# Patient Record
Sex: FEMALE | Race: WHITE | NOT HISPANIC OR LATINO
[De-identification: names, ages, dates, MRNs, and addresses within clinical notes are randomized per-mention and may not be internally consistent; named-entity substitution may affect disease eponyms.]

---

## 2022-01-01 ENCOUNTER — TRANSCRIPTION ENCOUNTER (OUTPATIENT)
Age: 82
End: 2022-01-01

## 2022-01-01 ENCOUNTER — INPATIENT (INPATIENT)
Facility: HOSPITAL | Age: 82
LOS: 9 days | Discharge: EXPIRED | End: 2022-12-13
Attending: INTERNAL MEDICINE | Admitting: INTERNAL MEDICINE

## 2022-01-01 ENCOUNTER — INPATIENT (INPATIENT)
Facility: HOSPITAL | Age: 82
LOS: 3 days | Discharge: HOME HEALTH | End: 2022-11-26
Attending: HOSPITALIST | Admitting: HOSPITALIST

## 2022-01-01 VITALS
OXYGEN SATURATION: 96 % | DIASTOLIC BLOOD PRESSURE: 61 MMHG | HEART RATE: 66 BPM | SYSTOLIC BLOOD PRESSURE: 131 MMHG | RESPIRATION RATE: 18 BRPM | TEMPERATURE: 98 F

## 2022-01-01 VITALS
SYSTOLIC BLOOD PRESSURE: 115 MMHG | OXYGEN SATURATION: 96 % | RESPIRATION RATE: 18 BRPM | DIASTOLIC BLOOD PRESSURE: 75 MMHG | HEART RATE: 108 BPM | TEMPERATURE: 97 F

## 2022-01-01 VITALS
RESPIRATION RATE: 19 BRPM | SYSTOLIC BLOOD PRESSURE: 99 MMHG | HEART RATE: 78 BPM | DIASTOLIC BLOOD PRESSURE: 47 MMHG | TEMPERATURE: 98 F | OXYGEN SATURATION: 94 %

## 2022-01-01 VITALS — HEIGHT: 63 IN

## 2022-01-01 DIAGNOSIS — Z51.5 ENCOUNTER FOR PALLIATIVE CARE: ICD-10-CM

## 2022-01-01 DIAGNOSIS — R13.10 DYSPHAGIA, UNSPECIFIED: ICD-10-CM

## 2022-01-01 DIAGNOSIS — R62.7 ADULT FAILURE TO THRIVE: ICD-10-CM

## 2022-01-01 DIAGNOSIS — I10 ESSENTIAL (PRIMARY) HYPERTENSION: ICD-10-CM

## 2022-01-01 DIAGNOSIS — E04.2 NONTOXIC MULTINODULAR GOITER: ICD-10-CM

## 2022-01-01 DIAGNOSIS — E27.8 OTHER SPECIFIED DISORDERS OF ADRENAL GLAND: ICD-10-CM

## 2022-01-01 DIAGNOSIS — Z29.9 ENCOUNTER FOR PROPHYLACTIC MEASURES, UNSPECIFIED: ICD-10-CM

## 2022-01-01 DIAGNOSIS — R93.89 ABNORMAL FINDINGS ON DIAGNOSTIC IMAGING OF OTHER SPECIFIED BODY STRUCTURES: ICD-10-CM

## 2022-01-01 DIAGNOSIS — A41.9 SEPSIS, UNSPECIFIED ORGANISM: ICD-10-CM

## 2022-01-01 DIAGNOSIS — N17.9 ACUTE KIDNEY FAILURE, UNSPECIFIED: ICD-10-CM

## 2022-01-01 DIAGNOSIS — K92.2 GASTROINTESTINAL HEMORRHAGE, UNSPECIFIED: ICD-10-CM

## 2022-01-01 DIAGNOSIS — R41.0 DISORIENTATION, UNSPECIFIED: ICD-10-CM

## 2022-01-01 DIAGNOSIS — E78.5 HYPERLIPIDEMIA, UNSPECIFIED: ICD-10-CM

## 2022-01-01 DIAGNOSIS — R77.8 OTHER SPECIFIED ABNORMALITIES OF PLASMA PROTEINS: ICD-10-CM

## 2022-01-01 DIAGNOSIS — R94.6 ABNORMAL RESULTS OF THYROID FUNCTION STUDIES: ICD-10-CM

## 2022-01-01 DIAGNOSIS — R53.1 WEAKNESS: ICD-10-CM

## 2022-01-01 DIAGNOSIS — E05.90 THYROTOXICOSIS, UNSPECIFIED WITHOUT THYROTOXIC CRISIS OR STORM: ICD-10-CM

## 2022-01-01 DIAGNOSIS — E87.8 OTHER DISORDERS OF ELECTROLYTE AND FLUID BALANCE, NOT ELSEWHERE CLASSIFIED: ICD-10-CM

## 2022-01-01 DIAGNOSIS — E83.52 HYPERCALCEMIA: ICD-10-CM

## 2022-01-01 DIAGNOSIS — K52.9 NONINFECTIVE GASTROENTERITIS AND COLITIS, UNSPECIFIED: ICD-10-CM

## 2022-01-01 DIAGNOSIS — U07.1 COVID-19: ICD-10-CM

## 2022-01-01 DIAGNOSIS — E87.0 HYPEROSMOLALITY AND HYPERNATREMIA: ICD-10-CM

## 2022-01-01 DIAGNOSIS — Z71.89 OTHER SPECIFIED COUNSELING: ICD-10-CM

## 2022-01-01 LAB
24R-OH-CALCIDIOL SERPL-MCNC: 45.4 NG/ML — SIGNIFICANT CHANGE UP (ref 30–80)
ALBUMIN SERPL ELPH-MCNC: 1.6 G/DL — LOW (ref 3.3–5)
ALBUMIN SERPL ELPH-MCNC: 1.7 G/DL — LOW (ref 3.3–5)
ALBUMIN SERPL ELPH-MCNC: 1.7 G/DL — LOW (ref 3.3–5)
ALBUMIN SERPL ELPH-MCNC: 1.8 G/DL — LOW (ref 3.3–5)
ALBUMIN SERPL ELPH-MCNC: 1.9 G/DL — LOW (ref 3.3–5)
ALBUMIN SERPL ELPH-MCNC: 2 G/DL — LOW (ref 3.3–5)
ALBUMIN SERPL ELPH-MCNC: 2.2 G/DL — LOW (ref 3.3–5)
ALBUMIN SERPL ELPH-MCNC: 2.2 G/DL — LOW (ref 3.3–5)
ALBUMIN SERPL ELPH-MCNC: 2.3 G/DL — LOW (ref 3.3–5)
ALBUMIN SERPL ELPH-MCNC: 2.8 G/DL — LOW (ref 3.3–5)
ALBUMIN SERPL ELPH-MCNC: 3 G/DL — LOW (ref 3.3–5)
ALBUMIN SERPL ELPH-MCNC: 3.5 G/DL — SIGNIFICANT CHANGE UP (ref 3.3–5)
ALDOST SERPL-MCNC: 3.3 NG/DL — SIGNIFICANT CHANGE UP
ALP SERPL-CCNC: 102 U/L — SIGNIFICANT CHANGE UP (ref 40–120)
ALP SERPL-CCNC: 103 U/L — SIGNIFICANT CHANGE UP (ref 40–120)
ALP SERPL-CCNC: 104 U/L — SIGNIFICANT CHANGE UP (ref 40–120)
ALP SERPL-CCNC: 105 U/L — SIGNIFICANT CHANGE UP (ref 40–120)
ALP SERPL-CCNC: 127 U/L — HIGH (ref 40–120)
ALP SERPL-CCNC: 130 U/L — HIGH (ref 40–120)
ALP SERPL-CCNC: 131 U/L — HIGH (ref 40–120)
ALP SERPL-CCNC: 140 U/L — HIGH (ref 40–120)
ALP SERPL-CCNC: 86 U/L — SIGNIFICANT CHANGE UP (ref 40–120)
ALP SERPL-CCNC: 96 U/L — SIGNIFICANT CHANGE UP (ref 40–120)
ALP SERPL-CCNC: 96 U/L — SIGNIFICANT CHANGE UP (ref 40–120)
ALP SERPL-CCNC: 99 U/L — SIGNIFICANT CHANGE UP (ref 40–120)
ALT FLD-CCNC: 10 U/L — SIGNIFICANT CHANGE UP (ref 4–33)
ALT FLD-CCNC: 10 U/L — SIGNIFICANT CHANGE UP (ref 4–33)
ALT FLD-CCNC: 109 U/L — HIGH (ref 4–33)
ALT FLD-CCNC: 145 U/L — HIGH (ref 4–33)
ALT FLD-CCNC: 174 U/L — HIGH (ref 4–33)
ALT FLD-CCNC: 35 U/L — HIGH (ref 4–33)
ALT FLD-CCNC: 36 U/L — HIGH (ref 4–33)
ALT FLD-CCNC: 50 U/L — HIGH (ref 4–33)
ALT FLD-CCNC: 52 U/L — HIGH (ref 4–33)
ALT FLD-CCNC: 71 U/L — HIGH (ref 4–33)
ALT FLD-CCNC: 93 U/L — HIGH (ref 4–33)
ALT FLD-CCNC: SIGNIFICANT CHANGE UP U/L (ref 4–33)
ANION GAP SERPL CALC-SCNC: 10 MMOL/L — SIGNIFICANT CHANGE UP (ref 7–14)
ANION GAP SERPL CALC-SCNC: 11 MMOL/L — SIGNIFICANT CHANGE UP (ref 7–14)
ANION GAP SERPL CALC-SCNC: 12 MMOL/L — SIGNIFICANT CHANGE UP (ref 7–14)
ANION GAP SERPL CALC-SCNC: 13 MMOL/L — SIGNIFICANT CHANGE UP (ref 7–14)
ANION GAP SERPL CALC-SCNC: 14 MMOL/L — SIGNIFICANT CHANGE UP (ref 7–14)
ANION GAP SERPL CALC-SCNC: 14 MMOL/L — SIGNIFICANT CHANGE UP (ref 7–14)
ANION GAP SERPL CALC-SCNC: 15 MMOL/L — HIGH (ref 7–14)
ANION GAP SERPL CALC-SCNC: 15 MMOL/L — HIGH (ref 7–14)
ANION GAP SERPL CALC-SCNC: 16 MMOL/L — HIGH (ref 7–14)
ANION GAP SERPL CALC-SCNC: 16 MMOL/L — HIGH (ref 7–14)
ANION GAP SERPL CALC-SCNC: 17 MMOL/L — HIGH (ref 7–14)
ANION GAP SERPL CALC-SCNC: 19 MMOL/L — HIGH (ref 7–14)
ANION GAP SERPL CALC-SCNC: 22 MMOL/L — HIGH (ref 7–14)
ANION GAP SERPL CALC-SCNC: 24 MMOL/L — HIGH (ref 7–14)
ANION GAP SERPL CALC-SCNC: 33 MMOL/L — HIGH (ref 7–14)
ANION GAP SERPL CALC-SCNC: 8 MMOL/L — SIGNIFICANT CHANGE UP (ref 7–14)
ANISOCYTOSIS BLD QL: SLIGHT — SIGNIFICANT CHANGE UP
APPEARANCE UR: ABNORMAL
APTT BLD: 30.3 SEC — SIGNIFICANT CHANGE UP (ref 27–36.3)
APTT BLD: 31.2 SEC — SIGNIFICANT CHANGE UP (ref 27–36.3)
AST SERPL-CCNC: 136 U/L — HIGH (ref 4–32)
AST SERPL-CCNC: 17 U/L — SIGNIFICANT CHANGE UP (ref 4–32)
AST SERPL-CCNC: 177 U/L — HIGH (ref 4–32)
AST SERPL-CCNC: 21 U/L — SIGNIFICANT CHANGE UP (ref 4–32)
AST SERPL-CCNC: 26 U/L — SIGNIFICANT CHANGE UP (ref 4–32)
AST SERPL-CCNC: 306 U/L — HIGH (ref 4–32)
AST SERPL-CCNC: 36 U/L — HIGH (ref 4–32)
AST SERPL-CCNC: 444 U/L — HIGH (ref 4–32)
AST SERPL-CCNC: 57 U/L — HIGH (ref 4–32)
AST SERPL-CCNC: 65 U/L — HIGH (ref 4–32)
AST SERPL-CCNC: 66 U/L — HIGH (ref 4–32)
AST SERPL-CCNC: 92 U/L — HIGH (ref 4–32)
B PERT DNA SPEC QL NAA+PROBE: SIGNIFICANT CHANGE UP
B PERT+PARAPERT DNA PNL SPEC NAA+PROBE: SIGNIFICANT CHANGE UP
BACTERIA # UR AUTO: NEGATIVE — SIGNIFICANT CHANGE UP
BASE EXCESS BLDV CALC-SCNC: -3 MMOL/L — LOW (ref -2–3)
BASE EXCESS BLDV CALC-SCNC: -6.6 MMOL/L — LOW (ref -2–3)
BASE EXCESS BLDV CALC-SCNC: -9.9 MMOL/L — LOW (ref -2–3)
BASE EXCESS BLDV CALC-SCNC: 2.9 MMOL/L — SIGNIFICANT CHANGE UP (ref -2–3)
BASE EXCESS BLDV CALC-SCNC: 3.9 MMOL/L — HIGH (ref -2–3)
BASE EXCESS BLDV CALC-SCNC: 5.9 MMOL/L — HIGH (ref -2–3)
BASOPHILS # BLD AUTO: 0 K/UL — SIGNIFICANT CHANGE UP (ref 0–0.2)
BASOPHILS # BLD AUTO: 0.01 K/UL — SIGNIFICANT CHANGE UP (ref 0–0.2)
BASOPHILS # BLD AUTO: 0.02 K/UL — SIGNIFICANT CHANGE UP (ref 0–0.2)
BASOPHILS # BLD AUTO: 0.02 K/UL — SIGNIFICANT CHANGE UP (ref 0–0.2)
BASOPHILS NFR BLD AUTO: 0 % — SIGNIFICANT CHANGE UP (ref 0–2)
BASOPHILS NFR BLD AUTO: 0.1 % — SIGNIFICANT CHANGE UP (ref 0–2)
BASOPHILS NFR BLD AUTO: 0.2 % — SIGNIFICANT CHANGE UP (ref 0–2)
BASOPHILS NFR BLD AUTO: 0.2 % — SIGNIFICANT CHANGE UP (ref 0–2)
BILIRUB DIRECT SERPL-MCNC: 0.2 MG/DL — SIGNIFICANT CHANGE UP (ref 0–0.3)
BILIRUB DIRECT SERPL-MCNC: 0.3 MG/DL — SIGNIFICANT CHANGE UP (ref 0–0.3)
BILIRUB DIRECT SERPL-MCNC: 0.4 MG/DL — HIGH (ref 0–0.3)
BILIRUB DIRECT SERPL-MCNC: <0.2 MG/DL — SIGNIFICANT CHANGE UP (ref 0–0.3)
BILIRUB DIRECT SERPL-MCNC: <0.2 MG/DL — SIGNIFICANT CHANGE UP (ref 0–0.3)
BILIRUB INDIRECT FLD-MCNC: 0.2 MG/DL — SIGNIFICANT CHANGE UP (ref 0–1)
BILIRUB INDIRECT FLD-MCNC: 0.3 MG/DL — SIGNIFICANT CHANGE UP (ref 0–1)
BILIRUB INDIRECT FLD-MCNC: 0.4 MG/DL — SIGNIFICANT CHANGE UP (ref 0–1)
BILIRUB INDIRECT FLD-MCNC: >0.3 MG/DL — SIGNIFICANT CHANGE UP (ref 0–1)
BILIRUB INDIRECT FLD-MCNC: >0.4 MG/DL — SIGNIFICANT CHANGE UP (ref 0–1)
BILIRUB SERPL-MCNC: 0.4 MG/DL — SIGNIFICANT CHANGE UP (ref 0.2–1.2)
BILIRUB SERPL-MCNC: 0.4 MG/DL — SIGNIFICANT CHANGE UP (ref 0.2–1.2)
BILIRUB SERPL-MCNC: 0.5 MG/DL — SIGNIFICANT CHANGE UP (ref 0.2–1.2)
BILIRUB SERPL-MCNC: 0.6 MG/DL — SIGNIFICANT CHANGE UP (ref 0.2–1.2)
BILIRUB SERPL-MCNC: 0.7 MG/DL — SIGNIFICANT CHANGE UP (ref 0.2–1.2)
BILIRUB SERPL-MCNC: 0.8 MG/DL — SIGNIFICANT CHANGE UP (ref 0.2–1.2)
BILIRUB SERPL-MCNC: 1.1 MG/DL — SIGNIFICANT CHANGE UP (ref 0.2–1.2)
BILIRUB SERPL-MCNC: 1.1 MG/DL — SIGNIFICANT CHANGE UP (ref 0.2–1.2)
BILIRUB SERPL-MCNC: 1.5 MG/DL — HIGH (ref 0.2–1.2)
BILIRUB UR-MCNC: ABNORMAL
BLD GP AB SCN SERPL QL: NEGATIVE — SIGNIFICANT CHANGE UP
BLD GP AB SCN SERPL QL: NEGATIVE — SIGNIFICANT CHANGE UP
BLOOD GAS VENOUS COMPREHENSIVE RESULT: SIGNIFICANT CHANGE UP
BORDETELLA PARAPERTUSSIS (RAPRVP): SIGNIFICANT CHANGE UP
BUN SERPL-MCNC: 15 MG/DL — SIGNIFICANT CHANGE UP (ref 7–23)
BUN SERPL-MCNC: 16 MG/DL — SIGNIFICANT CHANGE UP (ref 7–23)
BUN SERPL-MCNC: 17 MG/DL — SIGNIFICANT CHANGE UP (ref 7–23)
BUN SERPL-MCNC: 20 MG/DL — SIGNIFICANT CHANGE UP (ref 7–23)
BUN SERPL-MCNC: 23 MG/DL — SIGNIFICANT CHANGE UP (ref 7–23)
BUN SERPL-MCNC: 25 MG/DL — HIGH (ref 7–23)
BUN SERPL-MCNC: 26 MG/DL — HIGH (ref 7–23)
BUN SERPL-MCNC: 30 MG/DL — HIGH (ref 7–23)
BUN SERPL-MCNC: 30 MG/DL — HIGH (ref 7–23)
BUN SERPL-MCNC: 33 MG/DL — HIGH (ref 7–23)
BUN SERPL-MCNC: 34 MG/DL — HIGH (ref 7–23)
BUN SERPL-MCNC: 35 MG/DL — HIGH (ref 7–23)
BUN SERPL-MCNC: 36 MG/DL — HIGH (ref 7–23)
BUN SERPL-MCNC: 41 MG/DL — HIGH (ref 7–23)
BUN SERPL-MCNC: 45 MG/DL — HIGH (ref 7–23)
BUN SERPL-MCNC: 49 MG/DL — HIGH (ref 7–23)
BUN SERPL-MCNC: 50 MG/DL — HIGH (ref 7–23)
BUN SERPL-MCNC: 50 MG/DL — HIGH (ref 7–23)
BUN SERPL-MCNC: 51 MG/DL — HIGH (ref 7–23)
BUN SERPL-MCNC: 52 MG/DL — HIGH (ref 7–23)
BUN SERPL-MCNC: 52 MG/DL — HIGH (ref 7–23)
BUN SERPL-MCNC: 53 MG/DL — HIGH (ref 7–23)
BUN SERPL-MCNC: 56 MG/DL — HIGH (ref 7–23)
BUN SERPL-MCNC: 57 MG/DL — HIGH (ref 7–23)
BUN SERPL-MCNC: 59 MG/DL — HIGH (ref 7–23)
C PNEUM DNA SPEC QL NAA+PROBE: SIGNIFICANT CHANGE UP
CA-I SERPL-SCNC: 1.21 MMOL/L — SIGNIFICANT CHANGE UP (ref 1.15–1.33)
CA-I SERPL-SCNC: 1.22 MMOL/L — SIGNIFICANT CHANGE UP (ref 1.15–1.33)
CALCIUM SERPL-MCNC: 10.2 MG/DL — SIGNIFICANT CHANGE UP (ref 8.4–10.5)
CALCIUM SERPL-MCNC: 10.7 MG/DL — HIGH (ref 8.4–10.5)
CALCIUM SERPL-MCNC: 6.9 MG/DL — LOW (ref 8.4–10.5)
CALCIUM SERPL-MCNC: 7 MG/DL — LOW (ref 8.4–10.5)
CALCIUM SERPL-MCNC: 7.1 MG/DL — LOW (ref 8.4–10.5)
CALCIUM SERPL-MCNC: 7.2 MG/DL — LOW (ref 8.4–10.5)
CALCIUM SERPL-MCNC: 7.3 MG/DL — LOW (ref 8.4–10.5)
CALCIUM SERPL-MCNC: 7.3 MG/DL — LOW (ref 8.4–10.5)
CALCIUM SERPL-MCNC: 7.4 MG/DL — LOW (ref 8.4–10.5)
CALCIUM SERPL-MCNC: 7.5 MG/DL — LOW (ref 8.4–10.5)
CALCIUM SERPL-MCNC: 7.7 MG/DL — LOW (ref 8.4–10.5)
CALCIUM SERPL-MCNC: 7.7 MG/DL — LOW (ref 8.4–10.5)
CALCIUM SERPL-MCNC: 8 MG/DL — LOW (ref 8.4–10.5)
CALCIUM SERPL-MCNC: 8.1 MG/DL — LOW (ref 8.4–10.5)
CALCIUM SERPL-MCNC: 8.1 MG/DL — LOW (ref 8.4–10.5)
CALCIUM SERPL-MCNC: 8.4 MG/DL — SIGNIFICANT CHANGE UP (ref 8.4–10.5)
CALCIUM SERPL-MCNC: 8.4 MG/DL — SIGNIFICANT CHANGE UP (ref 8.4–10.5)
CALCIUM SERPL-MCNC: 8.5 MG/DL — SIGNIFICANT CHANGE UP (ref 8.4–10.5)
CALCIUM SERPL-MCNC: 8.7 MG/DL — SIGNIFICANT CHANGE UP (ref 8.4–10.5)
CALCIUM SERPL-MCNC: 8.8 MG/DL — SIGNIFICANT CHANGE UP (ref 8.4–10.5)
CALCIUM SERPL-MCNC: 8.8 MG/DL — SIGNIFICANT CHANGE UP (ref 8.4–10.5)
CALCIUM SERPL-MCNC: 8.9 MG/DL — SIGNIFICANT CHANGE UP (ref 8.4–10.5)
CALCIUM SERPL-MCNC: 8.9 MG/DL — SIGNIFICANT CHANGE UP (ref 8.4–10.5)
CALCIUM SERPL-MCNC: 9.1 MG/DL — SIGNIFICANT CHANGE UP (ref 8.4–10.5)
CALCIUM SERPL-MCNC: 9.1 MG/DL — SIGNIFICANT CHANGE UP (ref 8.4–10.5)
CALCIUM SERPL-MCNC: 9.2 MG/DL — SIGNIFICANT CHANGE UP (ref 8.4–10.5)
CALCIUM SERPL-MCNC: 9.3 MG/DL — SIGNIFICANT CHANGE UP (ref 8.4–10.5)
CHLORIDE BLDV-SCNC: 104 MMOL/L — SIGNIFICANT CHANGE UP (ref 96–108)
CHLORIDE BLDV-SCNC: 113 MMOL/L — HIGH (ref 96–108)
CHLORIDE BLDV-SCNC: 113 MMOL/L — HIGH (ref 96–108)
CHLORIDE BLDV-SCNC: 116 MMOL/L — HIGH (ref 96–108)
CHLORIDE BLDV-SCNC: 118 MMOL/L — HIGH (ref 96–108)
CHLORIDE BLDV-SCNC: 119 MMOL/L — HIGH (ref 96–108)
CHLORIDE SERPL-SCNC: 101 MMOL/L — SIGNIFICANT CHANGE UP (ref 98–107)
CHLORIDE SERPL-SCNC: 102 MMOL/L — SIGNIFICANT CHANGE UP (ref 98–107)
CHLORIDE SERPL-SCNC: 104 MMOL/L — SIGNIFICANT CHANGE UP (ref 98–107)
CHLORIDE SERPL-SCNC: 105 MMOL/L — SIGNIFICANT CHANGE UP (ref 98–107)
CHLORIDE SERPL-SCNC: 106 MMOL/L — SIGNIFICANT CHANGE UP (ref 98–107)
CHLORIDE SERPL-SCNC: 107 MMOL/L — SIGNIFICANT CHANGE UP (ref 98–107)
CHLORIDE SERPL-SCNC: 107 MMOL/L — SIGNIFICANT CHANGE UP (ref 98–107)
CHLORIDE SERPL-SCNC: 110 MMOL/L — HIGH (ref 98–107)
CHLORIDE SERPL-SCNC: 111 MMOL/L — HIGH (ref 98–107)
CHLORIDE SERPL-SCNC: 112 MMOL/L — HIGH (ref 98–107)
CHLORIDE SERPL-SCNC: 113 MMOL/L — HIGH (ref 98–107)
CHLORIDE SERPL-SCNC: 113 MMOL/L — HIGH (ref 98–107)
CHLORIDE SERPL-SCNC: 114 MMOL/L — HIGH (ref 98–107)
CHLORIDE SERPL-SCNC: 114 MMOL/L — HIGH (ref 98–107)
CHLORIDE SERPL-SCNC: 115 MMOL/L — HIGH (ref 98–107)
CHLORIDE SERPL-SCNC: 116 MMOL/L — HIGH (ref 98–107)
CHLORIDE SERPL-SCNC: 118 MMOL/L — HIGH (ref 98–107)
CHLORIDE SERPL-SCNC: 119 MMOL/L — HIGH (ref 98–107)
CHLORIDE SERPL-SCNC: 120 MMOL/L — HIGH (ref 98–107)
CHLORIDE SERPL-SCNC: 121 MMOL/L — HIGH (ref 98–107)
CLOSURE TME COLL+EPINEP BLD: 34 K/UL — LOW (ref 150–400)
CO2 BLDV-SCNC: 23.3 MMOL/L — SIGNIFICANT CHANGE UP (ref 22–26)
CO2 BLDV-SCNC: 23.7 MMOL/L — SIGNIFICANT CHANGE UP (ref 22–26)
CO2 BLDV-SCNC: 25.1 MMOL/L — SIGNIFICANT CHANGE UP (ref 22–26)
CO2 BLDV-SCNC: 29.9 MMOL/L — HIGH (ref 22–26)
CO2 BLDV-SCNC: 31.8 MMOL/L — HIGH (ref 22–26)
CO2 BLDV-SCNC: 32.6 MMOL/L — HIGH (ref 22–26)
CO2 SERPL-SCNC: 16 MMOL/L — LOW (ref 22–31)
CO2 SERPL-SCNC: 16 MMOL/L — LOW (ref 22–31)
CO2 SERPL-SCNC: 18 MMOL/L — LOW (ref 22–31)
CO2 SERPL-SCNC: 19 MMOL/L — LOW (ref 22–31)
CO2 SERPL-SCNC: 22 MMOL/L — SIGNIFICANT CHANGE UP (ref 22–31)
CO2 SERPL-SCNC: 23 MMOL/L — SIGNIFICANT CHANGE UP (ref 22–31)
CO2 SERPL-SCNC: 24 MMOL/L — SIGNIFICANT CHANGE UP (ref 22–31)
CO2 SERPL-SCNC: 25 MMOL/L — SIGNIFICANT CHANGE UP (ref 22–31)
CO2 SERPL-SCNC: 26 MMOL/L — SIGNIFICANT CHANGE UP (ref 22–31)
CO2 SERPL-SCNC: 27 MMOL/L — SIGNIFICANT CHANGE UP (ref 22–31)
CO2 SERPL-SCNC: 28 MMOL/L — SIGNIFICANT CHANGE UP (ref 22–31)
CO2 SERPL-SCNC: 28 MMOL/L — SIGNIFICANT CHANGE UP (ref 22–31)
CO2 SERPL-SCNC: 29 MMOL/L — SIGNIFICANT CHANGE UP (ref 22–31)
COLOR SPEC: YELLOW — SIGNIFICANT CHANGE UP
COMMENT - URINE: SIGNIFICANT CHANGE UP
CREAT SERPL-MCNC: 0.33 MG/DL — LOW (ref 0.5–1.3)
CREAT SERPL-MCNC: 0.4 MG/DL — LOW (ref 0.5–1.3)
CREAT SERPL-MCNC: 0.42 MG/DL — LOW (ref 0.5–1.3)
CREAT SERPL-MCNC: 0.42 MG/DL — LOW (ref 0.5–1.3)
CREAT SERPL-MCNC: 0.45 MG/DL — LOW (ref 0.5–1.3)
CREAT SERPL-MCNC: 0.46 MG/DL — LOW (ref 0.5–1.3)
CREAT SERPL-MCNC: 0.48 MG/DL — LOW (ref 0.5–1.3)
CREAT SERPL-MCNC: 0.48 MG/DL — LOW (ref 0.5–1.3)
CREAT SERPL-MCNC: 0.49 MG/DL — LOW (ref 0.5–1.3)
CREAT SERPL-MCNC: 0.5 MG/DL — SIGNIFICANT CHANGE UP (ref 0.5–1.3)
CREAT SERPL-MCNC: 0.54 MG/DL — SIGNIFICANT CHANGE UP (ref 0.5–1.3)
CREAT SERPL-MCNC: 0.59 MG/DL — SIGNIFICANT CHANGE UP (ref 0.5–1.3)
CREAT SERPL-MCNC: 0.6 MG/DL — SIGNIFICANT CHANGE UP (ref 0.5–1.3)
CREAT SERPL-MCNC: 0.6 MG/DL — SIGNIFICANT CHANGE UP (ref 0.5–1.3)
CREAT SERPL-MCNC: 0.66 MG/DL — SIGNIFICANT CHANGE UP (ref 0.5–1.3)
CREAT SERPL-MCNC: 0.66 MG/DL — SIGNIFICANT CHANGE UP (ref 0.5–1.3)
CREAT SERPL-MCNC: 0.7 MG/DL — SIGNIFICANT CHANGE UP (ref 0.5–1.3)
CREAT SERPL-MCNC: 0.73 MG/DL — SIGNIFICANT CHANGE UP (ref 0.5–1.3)
CREAT SERPL-MCNC: 0.87 MG/DL — SIGNIFICANT CHANGE UP (ref 0.5–1.3)
CREAT SERPL-MCNC: 0.91 MG/DL — SIGNIFICANT CHANGE UP (ref 0.5–1.3)
CREAT SERPL-MCNC: 0.99 MG/DL — SIGNIFICANT CHANGE UP (ref 0.5–1.3)
CREAT SERPL-MCNC: 1.02 MG/DL — SIGNIFICANT CHANGE UP (ref 0.5–1.3)
CREAT SERPL-MCNC: 1.27 MG/DL — SIGNIFICANT CHANGE UP (ref 0.5–1.3)
CREAT SERPL-MCNC: 1.35 MG/DL — HIGH (ref 0.5–1.3)
CREAT SERPL-MCNC: 1.35 MG/DL — HIGH (ref 0.5–1.3)
CREAT SERPL-MCNC: 1.47 MG/DL — HIGH (ref 0.5–1.3)
CRP SERPL-MCNC: 14.7 MG/L — HIGH
CULTURE RESULTS: SIGNIFICANT CHANGE UP
D DIMER BLD IA.RAPID-MCNC: 173 NG/ML DDU — SIGNIFICANT CHANGE UP
D DIMER BLD IA.RAPID-MCNC: 2105 NG/ML DDU — HIGH
D DIMER BLD IA.RAPID-MCNC: 461 NG/ML DDU — HIGH
D DIMER BLD IA.RAPID-MCNC: 522 NG/ML DDU — HIGH
DEXAMETHASONE SERPL-MCNC: 149 NG/DL — SIGNIFICANT CHANGE UP
DIFF PNL FLD: ABNORMAL
EGFR: 100 ML/MIN/1.73M2 — SIGNIFICANT CHANGE UP
EGFR: 105 ML/MIN/1.73M2 — SIGNIFICANT CHANGE UP
EGFR: 36 ML/MIN/1.73M2 — LOW
EGFR: 40 ML/MIN/1.73M2 — LOW
EGFR: 40 ML/MIN/1.73M2 — LOW
EGFR: 43 ML/MIN/1.73M2 — LOW
EGFR: 56 ML/MIN/1.73M2 — LOW
EGFR: 58 ML/MIN/1.73M2 — LOW
EGFR: 64 ML/MIN/1.73M2 — SIGNIFICANT CHANGE UP
EGFR: 67 ML/MIN/1.73M2 — SIGNIFICANT CHANGE UP
EGFR: 83 ML/MIN/1.73M2 — SIGNIFICANT CHANGE UP
EGFR: 87 ML/MIN/1.73M2 — SIGNIFICANT CHANGE UP
EGFR: 89 ML/MIN/1.73M2 — SIGNIFICANT CHANGE UP
EGFR: 89 ML/MIN/1.73M2 — SIGNIFICANT CHANGE UP
EGFR: 91 ML/MIN/1.73M2 — SIGNIFICANT CHANGE UP
EGFR: 93 ML/MIN/1.73M2 — SIGNIFICANT CHANGE UP
EGFR: 95 ML/MIN/1.73M2 — SIGNIFICANT CHANGE UP
EGFR: 96 ML/MIN/1.73M2 — SIGNIFICANT CHANGE UP
EGFR: 97 ML/MIN/1.73M2 — SIGNIFICANT CHANGE UP
EGFR: 98 ML/MIN/1.73M2 — SIGNIFICANT CHANGE UP
EGFR: 99 ML/MIN/1.73M2 — SIGNIFICANT CHANGE UP
EGFR: 99 ML/MIN/1.73M2 — SIGNIFICANT CHANGE UP
EOSINOPHIL # BLD AUTO: 0 K/UL — SIGNIFICANT CHANGE UP (ref 0–0.5)
EOSINOPHIL # BLD AUTO: 0.04 K/UL — SIGNIFICANT CHANGE UP (ref 0–0.5)
EOSINOPHIL NFR BLD AUTO: 0 % — SIGNIFICANT CHANGE UP (ref 0–6)
EOSINOPHIL NFR BLD AUTO: 0.3 % — SIGNIFICANT CHANGE UP (ref 0–6)
EPI CELLS # UR: 12 /HPF — HIGH (ref 0–5)
FERRITIN SERPL-MCNC: 778 NG/ML — HIGH (ref 15–150)
FLUAV AG NPH QL: SIGNIFICANT CHANGE UP
FLUAV SUBTYP SPEC NAA+PROBE: SIGNIFICANT CHANGE UP
FLUBV AG NPH QL: SIGNIFICANT CHANGE UP
FLUBV RNA SPEC QL NAA+PROBE: SIGNIFICANT CHANGE UP
GAS PNL BLDV: 142 MMOL/L — SIGNIFICANT CHANGE UP (ref 136–145)
GAS PNL BLDV: 150 MMOL/L — HIGH (ref 136–145)
GAS PNL BLDV: 150 MMOL/L — HIGH (ref 136–145)
GAS PNL BLDV: 151 MMOL/L — HIGH (ref 136–145)
GAS PNL BLDV: 153 MMOL/L — HIGH (ref 136–145)
GAS PNL BLDV: 153 MMOL/L — HIGH (ref 136–145)
GAS PNL BLDV: SIGNIFICANT CHANGE UP
GLUCOSE BLDC GLUCOMTR-MCNC: 144 MG/DL — HIGH (ref 70–99)
GLUCOSE BLDC GLUCOMTR-MCNC: 163 MG/DL — HIGH (ref 70–99)
GLUCOSE BLDC GLUCOMTR-MCNC: 191 MG/DL — HIGH (ref 70–99)
GLUCOSE BLDC GLUCOMTR-MCNC: 196 MG/DL — HIGH (ref 70–99)
GLUCOSE BLDC GLUCOMTR-MCNC: 196 MG/DL — HIGH (ref 70–99)
GLUCOSE BLDC GLUCOMTR-MCNC: 198 MG/DL — HIGH (ref 70–99)
GLUCOSE BLDC GLUCOMTR-MCNC: 199 MG/DL — HIGH (ref 70–99)
GLUCOSE BLDC GLUCOMTR-MCNC: 212 MG/DL — HIGH (ref 70–99)
GLUCOSE BLDC GLUCOMTR-MCNC: 212 MG/DL — HIGH (ref 70–99)
GLUCOSE BLDC GLUCOMTR-MCNC: 213 MG/DL — HIGH (ref 70–99)
GLUCOSE BLDC GLUCOMTR-MCNC: 214 MG/DL — HIGH (ref 70–99)
GLUCOSE BLDC GLUCOMTR-MCNC: 232 MG/DL — HIGH (ref 70–99)
GLUCOSE BLDC GLUCOMTR-MCNC: 235 MG/DL — HIGH (ref 70–99)
GLUCOSE BLDC GLUCOMTR-MCNC: 236 MG/DL — HIGH (ref 70–99)
GLUCOSE BLDC GLUCOMTR-MCNC: 240 MG/DL — HIGH (ref 70–99)
GLUCOSE BLDC GLUCOMTR-MCNC: 241 MG/DL — HIGH (ref 70–99)
GLUCOSE BLDC GLUCOMTR-MCNC: 251 MG/DL — HIGH (ref 70–99)
GLUCOSE BLDC GLUCOMTR-MCNC: 258 MG/DL — HIGH (ref 70–99)
GLUCOSE BLDC GLUCOMTR-MCNC: 260 MG/DL — HIGH (ref 70–99)
GLUCOSE BLDC GLUCOMTR-MCNC: 273 MG/DL — HIGH (ref 70–99)
GLUCOSE BLDC GLUCOMTR-MCNC: 281 MG/DL — HIGH (ref 70–99)
GLUCOSE BLDC GLUCOMTR-MCNC: 319 MG/DL — HIGH (ref 70–99)
GLUCOSE BLDC GLUCOMTR-MCNC: 475 MG/DL — CRITICAL HIGH (ref 70–99)
GLUCOSE BLDV-MCNC: 115 MG/DL — HIGH (ref 70–99)
GLUCOSE BLDV-MCNC: 131 MG/DL — HIGH (ref 70–99)
GLUCOSE BLDV-MCNC: 144 MG/DL — HIGH (ref 70–99)
GLUCOSE BLDV-MCNC: 146 MG/DL — HIGH (ref 70–99)
GLUCOSE BLDV-MCNC: 184 MG/DL — HIGH (ref 70–99)
GLUCOSE BLDV-MCNC: 263 MG/DL — HIGH (ref 70–99)
GLUCOSE SERPL-MCNC: 113 MG/DL — HIGH (ref 70–99)
GLUCOSE SERPL-MCNC: 113 MG/DL — HIGH (ref 70–99)
GLUCOSE SERPL-MCNC: 137 MG/DL — HIGH (ref 70–99)
GLUCOSE SERPL-MCNC: 138 MG/DL — HIGH (ref 70–99)
GLUCOSE SERPL-MCNC: 141 MG/DL — HIGH (ref 70–99)
GLUCOSE SERPL-MCNC: 153 MG/DL — HIGH (ref 70–99)
GLUCOSE SERPL-MCNC: 164 MG/DL — HIGH (ref 70–99)
GLUCOSE SERPL-MCNC: 169 MG/DL — HIGH (ref 70–99)
GLUCOSE SERPL-MCNC: 178 MG/DL — HIGH (ref 70–99)
GLUCOSE SERPL-MCNC: 186 MG/DL — HIGH (ref 70–99)
GLUCOSE SERPL-MCNC: 194 MG/DL — HIGH (ref 70–99)
GLUCOSE SERPL-MCNC: 197 MG/DL — HIGH (ref 70–99)
GLUCOSE SERPL-MCNC: 209 MG/DL — HIGH (ref 70–99)
GLUCOSE SERPL-MCNC: 211 MG/DL — HIGH (ref 70–99)
GLUCOSE SERPL-MCNC: 216 MG/DL — HIGH (ref 70–99)
GLUCOSE SERPL-MCNC: 216 MG/DL — HIGH (ref 70–99)
GLUCOSE SERPL-MCNC: 247 MG/DL — HIGH (ref 70–99)
GLUCOSE SERPL-MCNC: 249 MG/DL — HIGH (ref 70–99)
GLUCOSE SERPL-MCNC: 252 MG/DL — HIGH (ref 70–99)
GLUCOSE SERPL-MCNC: 278 MG/DL — HIGH (ref 70–99)
GLUCOSE SERPL-MCNC: 309 MG/DL — HIGH (ref 70–99)
GLUCOSE SERPL-MCNC: 71 MG/DL — SIGNIFICANT CHANGE UP (ref 70–99)
GLUCOSE SERPL-MCNC: 76 MG/DL — SIGNIFICANT CHANGE UP (ref 70–99)
GLUCOSE SERPL-MCNC: 83 MG/DL — SIGNIFICANT CHANGE UP (ref 70–99)
GLUCOSE SERPL-MCNC: 83 MG/DL — SIGNIFICANT CHANGE UP (ref 70–99)
GLUCOSE SERPL-MCNC: 85 MG/DL — SIGNIFICANT CHANGE UP (ref 70–99)
GLUCOSE SERPL-MCNC: 88 MG/DL — SIGNIFICANT CHANGE UP (ref 70–99)
GLUCOSE UR QL: NEGATIVE — SIGNIFICANT CHANGE UP
HADV DNA SPEC QL NAA+PROBE: SIGNIFICANT CHANGE UP
HCO3 BLDV-SCNC: 22 MMOL/L — SIGNIFICANT CHANGE UP (ref 22–29)
HCO3 BLDV-SCNC: 22 MMOL/L — SIGNIFICANT CHANGE UP (ref 22–29)
HCO3 BLDV-SCNC: 24 MMOL/L — SIGNIFICANT CHANGE UP (ref 22–29)
HCO3 BLDV-SCNC: 28 MMOL/L — SIGNIFICANT CHANGE UP (ref 22–29)
HCO3 BLDV-SCNC: 30 MMOL/L — HIGH (ref 22–29)
HCO3 BLDV-SCNC: 31 MMOL/L — HIGH (ref 22–29)
HCOV 229E RNA SPEC QL NAA+PROBE: SIGNIFICANT CHANGE UP
HCOV HKU1 RNA SPEC QL NAA+PROBE: SIGNIFICANT CHANGE UP
HCOV NL63 RNA SPEC QL NAA+PROBE: SIGNIFICANT CHANGE UP
HCOV OC43 RNA SPEC QL NAA+PROBE: SIGNIFICANT CHANGE UP
HCT VFR BLD CALC: 30 % — LOW (ref 34.5–45)
HCT VFR BLD CALC: 31 % — LOW (ref 34.5–45)
HCT VFR BLD CALC: 35.1 % — SIGNIFICANT CHANGE UP (ref 34.5–45)
HCT VFR BLD CALC: 38.8 % — SIGNIFICANT CHANGE UP (ref 34.5–45)
HCT VFR BLD CALC: 40.2 % — SIGNIFICANT CHANGE UP (ref 34.5–45)
HCT VFR BLD CALC: 40.9 % — SIGNIFICANT CHANGE UP (ref 34.5–45)
HCT VFR BLD CALC: 42.4 % — SIGNIFICANT CHANGE UP (ref 34.5–45)
HCT VFR BLD CALC: 43.2 % — SIGNIFICANT CHANGE UP (ref 34.5–45)
HCT VFR BLD CALC: 43.5 % — SIGNIFICANT CHANGE UP (ref 34.5–45)
HCT VFR BLD CALC: 43.6 % — SIGNIFICANT CHANGE UP (ref 34.5–45)
HCT VFR BLD CALC: 45.8 % — HIGH (ref 34.5–45)
HCT VFR BLD CALC: 45.9 % — HIGH (ref 34.5–45)
HCT VFR BLD CALC: 46.4 % — HIGH (ref 34.5–45)
HCT VFR BLD CALC: 47.4 % — HIGH (ref 34.5–45)
HCT VFR BLD CALC: 51.4 % — HIGH (ref 34.5–45)
HCT VFR BLD CALC: 51.4 % — HIGH (ref 34.5–45)
HCT VFR BLD CALC: 53.6 % — HIGH (ref 34.5–45)
HCT VFR BLDA CALC: 44 % — SIGNIFICANT CHANGE UP (ref 34.5–46.5)
HCT VFR BLDA CALC: 48 % — HIGH (ref 34.5–46.5)
HCT VFR BLDA CALC: 49 % — HIGH (ref 34.5–46.5)
HCT VFR BLDA CALC: 50 % — HIGH (ref 34.5–46.5)
HCT VFR BLDA CALC: 51 % — HIGH (ref 34.5–46.5)
HCT VFR BLDA CALC: 51 % — HIGH (ref 34.5–46.5)
HEPARIN-PF4 AB RESULT: 0.6 U/ML — SIGNIFICANT CHANGE UP (ref 0–0.9)
HEPARIN-PF4 AB RESULT: <0.6 U/ML — SIGNIFICANT CHANGE UP (ref 0–0.9)
HGB BLD CALC-MCNC: 14.6 G/DL — SIGNIFICANT CHANGE UP (ref 11.5–15.5)
HGB BLD CALC-MCNC: 16.1 G/DL — HIGH (ref 11.5–15.5)
HGB BLD CALC-MCNC: 16.2 G/DL — HIGH (ref 11.5–15.5)
HGB BLD CALC-MCNC: 16.6 G/DL — HIGH (ref 11.5–15.5)
HGB BLD CALC-MCNC: 17 G/DL — HIGH (ref 11.5–15.5)
HGB BLD CALC-MCNC: 17 G/DL — HIGH (ref 11.5–15.5)
HGB BLD-MCNC: 10.6 G/DL — LOW (ref 11.5–15.5)
HGB BLD-MCNC: 12.4 G/DL — SIGNIFICANT CHANGE UP (ref 11.5–15.5)
HGB BLD-MCNC: 13.4 G/DL — SIGNIFICANT CHANGE UP (ref 11.5–15.5)
HGB BLD-MCNC: 13.6 G/DL — SIGNIFICANT CHANGE UP (ref 11.5–15.5)
HGB BLD-MCNC: 13.7 G/DL — SIGNIFICANT CHANGE UP (ref 11.5–15.5)
HGB BLD-MCNC: 13.7 G/DL — SIGNIFICANT CHANGE UP (ref 11.5–15.5)
HGB BLD-MCNC: 13.8 G/DL — SIGNIFICANT CHANGE UP (ref 11.5–15.5)
HGB BLD-MCNC: 14.1 G/DL — SIGNIFICANT CHANGE UP (ref 11.5–15.5)
HGB BLD-MCNC: 14.1 G/DL — SIGNIFICANT CHANGE UP (ref 11.5–15.5)
HGB BLD-MCNC: 14.7 G/DL — SIGNIFICANT CHANGE UP (ref 11.5–15.5)
HGB BLD-MCNC: 14.7 G/DL — SIGNIFICANT CHANGE UP (ref 11.5–15.5)
HGB BLD-MCNC: 14.8 G/DL — SIGNIFICANT CHANGE UP (ref 11.5–15.5)
HGB BLD-MCNC: 16 G/DL — HIGH (ref 11.5–15.5)
HGB BLD-MCNC: 16.1 G/DL — HIGH (ref 11.5–15.5)
HGB BLD-MCNC: 16.6 G/DL — HIGH (ref 11.5–15.5)
HGB BLD-MCNC: 9.4 G/DL — LOW (ref 11.5–15.5)
HGB BLD-MCNC: 9.6 G/DL — LOW (ref 11.5–15.5)
HMPV RNA SPEC QL NAA+PROBE: SIGNIFICANT CHANGE UP
HPIV1 RNA SPEC QL NAA+PROBE: SIGNIFICANT CHANGE UP
HPIV2 RNA SPEC QL NAA+PROBE: SIGNIFICANT CHANGE UP
HPIV3 RNA SPEC QL NAA+PROBE: SIGNIFICANT CHANGE UP
HPIV4 RNA SPEC QL NAA+PROBE: SIGNIFICANT CHANGE UP
HYALINE CASTS # UR AUTO: ABNORMAL (ref 0–7)
IANC: 10.54 K/UL — HIGH (ref 1.8–7.4)
IANC: 10.7 K/UL — HIGH (ref 1.8–7.4)
IANC: 6.84 K/UL — SIGNIFICANT CHANGE UP (ref 1.8–7.4)
IANC: 8.6 K/UL — HIGH (ref 1.8–7.4)
IMM GRANULOCYTES NFR BLD AUTO: 0.5 % — SIGNIFICANT CHANGE UP (ref 0–0.9)
IMM GRANULOCYTES NFR BLD AUTO: 1.1 % — HIGH (ref 0–0.9)
IMM GRANULOCYTES NFR BLD AUTO: 1.3 % — HIGH (ref 0–0.9)
INR BLD: 1.3 RATIO — HIGH (ref 0.88–1.16)
INR BLD: 1.36 RATIO — HIGH (ref 0.88–1.16)
KETONES UR-MCNC: ABNORMAL
LACTATE BLDV-MCNC: 11.1 MMOL/L — CRITICAL HIGH (ref 0.5–2)
LACTATE BLDV-MCNC: 15.5 MMOL/L — CRITICAL HIGH (ref 0.5–2)
LACTATE BLDV-MCNC: 16.5 MMOL/L — CRITICAL HIGH (ref 0.5–2)
LACTATE BLDV-MCNC: 2.1 MMOL/L — HIGH (ref 0.5–2)
LACTATE BLDV-MCNC: 3.4 MMOL/L — HIGH (ref 0.5–2)
LACTATE BLDV-MCNC: 3.7 MMOL/L — HIGH (ref 0.5–2)
LACTATE SERPL-SCNC: 1.7 MMOL/L — SIGNIFICANT CHANGE UP (ref 0.5–2)
LACTATE SERPL-SCNC: 2.1 MMOL/L — HIGH (ref 0.5–2)
LACTATE SERPL-SCNC: 2.3 MMOL/L — HIGH (ref 0.5–2)
LACTATE SERPL-SCNC: 2.3 MMOL/L — HIGH (ref 0.5–2)
LACTATE SERPL-SCNC: 2.8 MMOL/L — HIGH (ref 0.5–2)
LACTATE SERPL-SCNC: 3.6 MMOL/L — HIGH (ref 0.5–2)
LEGIONELLA AG UR QL: NEGATIVE — SIGNIFICANT CHANGE UP
LEUKOCYTE ESTERASE UR-ACNC: NEGATIVE — SIGNIFICANT CHANGE UP
LYMPHOCYTES # BLD AUTO: 0.23 K/UL — LOW (ref 1–3.3)
LYMPHOCYTES # BLD AUTO: 0.41 K/UL — LOW (ref 1–3.3)
LYMPHOCYTES # BLD AUTO: 0.43 K/UL — LOW (ref 1–3.3)
LYMPHOCYTES # BLD AUTO: 0.92 K/UL — LOW (ref 1–3.3)
LYMPHOCYTES # BLD AUTO: 2 % — LOW (ref 13–44)
LYMPHOCYTES # BLD AUTO: 4.4 % — LOW (ref 13–44)
LYMPHOCYTES # BLD AUTO: 5.7 % — LOW (ref 13–44)
LYMPHOCYTES # BLD AUTO: 7.2 % — LOW (ref 13–44)
M PNEUMO DNA SPEC QL NAA+PROBE: SIGNIFICANT CHANGE UP
MAGNESIUM SERPL-MCNC: 1.1 MG/DL — LOW (ref 1.6–2.6)
MAGNESIUM SERPL-MCNC: 1.5 MG/DL — LOW (ref 1.6–2.6)
MAGNESIUM SERPL-MCNC: 1.6 MG/DL — SIGNIFICANT CHANGE UP (ref 1.6–2.6)
MAGNESIUM SERPL-MCNC: 1.7 MG/DL — SIGNIFICANT CHANGE UP (ref 1.6–2.6)
MAGNESIUM SERPL-MCNC: 1.7 MG/DL — SIGNIFICANT CHANGE UP (ref 1.6–2.6)
MAGNESIUM SERPL-MCNC: 1.8 MG/DL — SIGNIFICANT CHANGE UP (ref 1.6–2.6)
MAGNESIUM SERPL-MCNC: 1.9 MG/DL — SIGNIFICANT CHANGE UP (ref 1.6–2.6)
MAGNESIUM SERPL-MCNC: 2 MG/DL — SIGNIFICANT CHANGE UP (ref 1.6–2.6)
MAGNESIUM SERPL-MCNC: 2.1 MG/DL — SIGNIFICANT CHANGE UP (ref 1.6–2.6)
MAGNESIUM SERPL-MCNC: 2.2 MG/DL — SIGNIFICANT CHANGE UP (ref 1.6–2.6)
MAGNESIUM SERPL-MCNC: 2.3 MG/DL — SIGNIFICANT CHANGE UP (ref 1.6–2.6)
MAGNESIUM SERPL-MCNC: 2.3 MG/DL — SIGNIFICANT CHANGE UP (ref 1.6–2.6)
MAGNESIUM SERPL-MCNC: 2.4 MG/DL — SIGNIFICANT CHANGE UP (ref 1.6–2.6)
MANUAL SMEAR VERIFICATION: SIGNIFICANT CHANGE UP
MCHC RBC-ENTMCNC: 27 PG — SIGNIFICANT CHANGE UP (ref 27–34)
MCHC RBC-ENTMCNC: 27.2 PG — SIGNIFICANT CHANGE UP (ref 27–34)
MCHC RBC-ENTMCNC: 27.2 PG — SIGNIFICANT CHANGE UP (ref 27–34)
MCHC RBC-ENTMCNC: 27.4 PG — SIGNIFICANT CHANGE UP (ref 27–34)
MCHC RBC-ENTMCNC: 27.5 PG — SIGNIFICANT CHANGE UP (ref 27–34)
MCHC RBC-ENTMCNC: 27.5 PG — SIGNIFICANT CHANGE UP (ref 27–34)
MCHC RBC-ENTMCNC: 27.6 PG — SIGNIFICANT CHANGE UP (ref 27–34)
MCHC RBC-ENTMCNC: 27.7 PG — SIGNIFICANT CHANGE UP (ref 27–34)
MCHC RBC-ENTMCNC: 27.7 PG — SIGNIFICANT CHANGE UP (ref 27–34)
MCHC RBC-ENTMCNC: 27.8 PG — SIGNIFICANT CHANGE UP (ref 27–34)
MCHC RBC-ENTMCNC: 28 PG — SIGNIFICANT CHANGE UP (ref 27–34)
MCHC RBC-ENTMCNC: 28.1 PG — SIGNIFICANT CHANGE UP (ref 27–34)
MCHC RBC-ENTMCNC: 28.5 PG — SIGNIFICANT CHANGE UP (ref 27–34)
MCHC RBC-ENTMCNC: 28.6 PG — SIGNIFICANT CHANGE UP (ref 27–34)
MCHC RBC-ENTMCNC: 29.7 GM/DL — LOW (ref 32–36)
MCHC RBC-ENTMCNC: 30.2 GM/DL — LOW (ref 32–36)
MCHC RBC-ENTMCNC: 30.3 GM/DL — LOW (ref 32–36)
MCHC RBC-ENTMCNC: 31 GM/DL — LOW (ref 32–36)
MCHC RBC-ENTMCNC: 31.1 GM/DL — LOW (ref 32–36)
MCHC RBC-ENTMCNC: 31.3 GM/DL — LOW (ref 32–36)
MCHC RBC-ENTMCNC: 31.7 GM/DL — LOW (ref 32–36)
MCHC RBC-ENTMCNC: 32 GM/DL — SIGNIFICANT CHANGE UP (ref 32–36)
MCHC RBC-ENTMCNC: 32.3 GM/DL — SIGNIFICANT CHANGE UP (ref 32–36)
MCHC RBC-ENTMCNC: 33.3 GM/DL — SIGNIFICANT CHANGE UP (ref 32–36)
MCHC RBC-ENTMCNC: 33.3 GM/DL — SIGNIFICANT CHANGE UP (ref 32–36)
MCV RBC AUTO: 82.9 FL — SIGNIFICANT CHANGE UP (ref 80–100)
MCV RBC AUTO: 83 FL — SIGNIFICANT CHANGE UP (ref 80–100)
MCV RBC AUTO: 84.2 FL — SIGNIFICANT CHANGE UP (ref 80–100)
MCV RBC AUTO: 85 FL — SIGNIFICANT CHANGE UP (ref 80–100)
MCV RBC AUTO: 85.5 FL — SIGNIFICANT CHANGE UP (ref 80–100)
MCV RBC AUTO: 85.7 FL — SIGNIFICANT CHANGE UP (ref 80–100)
MCV RBC AUTO: 86 FL — SIGNIFICANT CHANGE UP (ref 80–100)
MCV RBC AUTO: 87.5 FL — SIGNIFICANT CHANGE UP (ref 80–100)
MCV RBC AUTO: 87.6 FL — SIGNIFICANT CHANGE UP (ref 80–100)
MCV RBC AUTO: 88.2 FL — SIGNIFICANT CHANGE UP (ref 80–100)
MCV RBC AUTO: 88.4 FL — SIGNIFICANT CHANGE UP (ref 80–100)
MCV RBC AUTO: 89.8 FL — SIGNIFICANT CHANGE UP (ref 80–100)
MCV RBC AUTO: 89.8 FL — SIGNIFICANT CHANGE UP (ref 80–100)
MCV RBC AUTO: 90 FL — SIGNIFICANT CHANGE UP (ref 80–100)
MCV RBC AUTO: 90.2 FL — SIGNIFICANT CHANGE UP (ref 80–100)
MCV RBC AUTO: 90.6 FL — SIGNIFICANT CHANGE UP (ref 80–100)
MCV RBC AUTO: 93.1 FL — SIGNIFICANT CHANGE UP (ref 80–100)
METAMYELOCYTES # FLD: 2 % — HIGH (ref 0–1)
METANEPHRINE, PL: 22.8 PG/ML — SIGNIFICANT CHANGE UP (ref 0–88)
MONOCYTES # BLD AUTO: 0 K/UL — SIGNIFICANT CHANGE UP (ref 0–0.9)
MONOCYTES # BLD AUTO: 0.16 K/UL — SIGNIFICANT CHANGE UP (ref 0–0.9)
MONOCYTES # BLD AUTO: 0.16 K/UL — SIGNIFICANT CHANGE UP (ref 0–0.9)
MONOCYTES # BLD AUTO: 1.01 K/UL — HIGH (ref 0–0.9)
MONOCYTES NFR BLD AUTO: 0 % — LOW (ref 2–14)
MONOCYTES NFR BLD AUTO: 1.7 % — LOW (ref 2–14)
MONOCYTES NFR BLD AUTO: 2.1 % — SIGNIFICANT CHANGE UP (ref 2–14)
MONOCYTES NFR BLD AUTO: 7.9 % — SIGNIFICANT CHANGE UP (ref 2–14)
MRSA PCR RESULT.: SIGNIFICANT CHANGE UP
NEUTROPHILS # BLD AUTO: 10.67 K/UL — HIGH (ref 1.8–7.4)
NEUTROPHILS # BLD AUTO: 10.7 K/UL — HIGH (ref 1.8–7.4)
NEUTROPHILS # BLD AUTO: 6.84 K/UL — SIGNIFICANT CHANGE UP (ref 1.8–7.4)
NEUTROPHILS # BLD AUTO: 8.6 K/UL — HIGH (ref 1.8–7.4)
NEUTROPHILS NFR BLD AUTO: 83.9 % — HIGH (ref 43–77)
NEUTROPHILS NFR BLD AUTO: 88 % — HIGH (ref 43–77)
NEUTROPHILS NFR BLD AUTO: 90.8 % — HIGH (ref 43–77)
NEUTROPHILS NFR BLD AUTO: 92.6 % — HIGH (ref 43–77)
NEUTS BAND # BLD: 6 % — SIGNIFICANT CHANGE UP (ref 0–6)
NITRITE UR-MCNC: NEGATIVE — SIGNIFICANT CHANGE UP
NORMETANEPHRINE, PL: 32.6 PG/ML — SIGNIFICANT CHANGE UP (ref 0–285.2)
NRBC # BLD: 0 /100 WBCS — SIGNIFICANT CHANGE UP (ref 0–0)
NRBC # BLD: 0 /100 — SIGNIFICANT CHANGE UP (ref 0–0)
NRBC # BLD: 4 /100 WBCS — HIGH (ref 0–0)
NRBC # FLD: 0 K/UL — SIGNIFICANT CHANGE UP (ref 0–0)
NRBC # FLD: 0.02 K/UL — HIGH (ref 0–0)
NRBC # FLD: 0.03 K/UL — HIGH (ref 0–0)
NRBC # FLD: 0.04 K/UL — HIGH (ref 0–0)
NRBC # FLD: 0.06 K/UL — HIGH (ref 0–0)
NRBC # FLD: 0.4 K/UL — HIGH (ref 0–0)
NT-PROBNP SERPL-SCNC: 607 PG/ML — HIGH
OB PNL STL: POSITIVE
PCO2 BLDV: 46 MMHG — HIGH (ref 39–42)
PCO2 BLDV: 46 MMHG — HIGH (ref 39–42)
PCO2 BLDV: 47 MMHG — HIGH (ref 39–42)
PCO2 BLDV: 50 MMHG — HIGH (ref 39–42)
PCO2 BLDV: 53 MMHG — HIGH (ref 39–42)
PCO2 BLDV: 71 MMHG — HIGH (ref 39–42)
PF4 HEPARIN CMPLX AB SER-ACNC: NEGATIVE — SIGNIFICANT CHANGE UP
PF4 HEPARIN CMPLX AB SER-ACNC: NEGATIVE — SIGNIFICANT CHANGE UP
PH BLDV: 7.09 — LOW (ref 7.32–7.43)
PH BLDV: 7.22 — LOW (ref 7.32–7.43)
PH BLDV: 7.31 — LOW (ref 7.32–7.43)
PH BLDV: 7.39 — SIGNIFICANT CHANGE UP (ref 7.32–7.43)
PH BLDV: 7.4 — SIGNIFICANT CHANGE UP (ref 7.32–7.43)
PH BLDV: 7.44 — HIGH (ref 7.32–7.43)
PH UR: 6 — SIGNIFICANT CHANGE UP (ref 5–8)
PHOSPHATE SERPL-MCNC: 1.6 MG/DL — LOW (ref 2.5–4.5)
PHOSPHATE SERPL-MCNC: 1.6 MG/DL — LOW (ref 2.5–4.5)
PHOSPHATE SERPL-MCNC: 1.8 MG/DL — LOW (ref 2.5–4.5)
PHOSPHATE SERPL-MCNC: 1.9 MG/DL — LOW (ref 2.5–4.5)
PHOSPHATE SERPL-MCNC: 2 MG/DL — LOW (ref 2.5–4.5)
PHOSPHATE SERPL-MCNC: 2 MG/DL — LOW (ref 2.5–4.5)
PHOSPHATE SERPL-MCNC: 2.1 MG/DL — LOW (ref 2.5–4.5)
PHOSPHATE SERPL-MCNC: 2.1 MG/DL — LOW (ref 2.5–4.5)
PHOSPHATE SERPL-MCNC: 2.2 MG/DL — LOW (ref 2.5–4.5)
PHOSPHATE SERPL-MCNC: 2.2 MG/DL — LOW (ref 2.5–4.5)
PHOSPHATE SERPL-MCNC: 2.3 MG/DL — LOW (ref 2.5–4.5)
PHOSPHATE SERPL-MCNC: 2.6 MG/DL — SIGNIFICANT CHANGE UP (ref 2.5–4.5)
PHOSPHATE SERPL-MCNC: 2.8 MG/DL — SIGNIFICANT CHANGE UP (ref 2.5–4.5)
PHOSPHATE SERPL-MCNC: 2.9 MG/DL — SIGNIFICANT CHANGE UP (ref 2.5–4.5)
PHOSPHATE SERPL-MCNC: 3.1 MG/DL — SIGNIFICANT CHANGE UP (ref 2.5–4.5)
PHOSPHATE SERPL-MCNC: 3.5 MG/DL — SIGNIFICANT CHANGE UP (ref 2.5–4.5)
PHOSPHATE SERPL-MCNC: 4 MG/DL — SIGNIFICANT CHANGE UP (ref 2.5–4.5)
PHOSPHATE SERPL-MCNC: 4.2 MG/DL — SIGNIFICANT CHANGE UP (ref 2.5–4.5)
PLAT MORPH BLD: NORMAL — SIGNIFICANT CHANGE UP
PLATELET # BLD AUTO: 100 K/UL — LOW (ref 150–400)
PLATELET # BLD AUTO: 107 K/UL — LOW (ref 150–400)
PLATELET # BLD AUTO: 110 K/UL — LOW (ref 150–400)
PLATELET # BLD AUTO: 125 K/UL — LOW (ref 150–400)
PLATELET # BLD AUTO: 131 K/UL — LOW (ref 150–400)
PLATELET # BLD AUTO: 145 K/UL — LOW (ref 150–400)
PLATELET # BLD AUTO: 145 K/UL — LOW (ref 150–400)
PLATELET # BLD AUTO: 185 K/UL — SIGNIFICANT CHANGE UP (ref 150–400)
PLATELET # BLD AUTO: 276 K/UL — SIGNIFICANT CHANGE UP (ref 150–400)
PLATELET # BLD AUTO: 294 K/UL — SIGNIFICANT CHANGE UP (ref 150–400)
PLATELET # BLD AUTO: 298 K/UL — SIGNIFICANT CHANGE UP (ref 150–400)
PLATELET # BLD AUTO: 341 K/UL — SIGNIFICANT CHANGE UP (ref 150–400)
PLATELET # BLD AUTO: 78 K/UL — LOW (ref 150–400)
PLATELET # BLD AUTO: 82 K/UL — LOW (ref 150–400)
PLATELET # BLD AUTO: 83 K/UL — LOW (ref 150–400)
PLATELET # BLD AUTO: 84 K/UL — LOW (ref 150–400)
PLATELET # BLD AUTO: 91 K/UL — LOW (ref 150–400)
PLATELET COUNT - ESTIMATE: NORMAL — SIGNIFICANT CHANGE UP
PO2 BLDV: 22 MMHG — SIGNIFICANT CHANGE UP
PO2 BLDV: 24 MMHG — SIGNIFICANT CHANGE UP
PO2 BLDV: 30 MMHG — SIGNIFICANT CHANGE UP
PO2 BLDV: 36 MMHG — SIGNIFICANT CHANGE UP
PO2 BLDV: 40 MMHG — SIGNIFICANT CHANGE UP
PO2 BLDV: 48 MMHG — SIGNIFICANT CHANGE UP
POIKILOCYTOSIS BLD QL AUTO: SLIGHT — SIGNIFICANT CHANGE UP
POTASSIUM BLDV-SCNC: 2.5 MMOL/L — CRITICAL LOW (ref 3.5–5.1)
POTASSIUM BLDV-SCNC: 3 MMOL/L — LOW (ref 3.5–5.1)
POTASSIUM BLDV-SCNC: 3.1 MMOL/L — LOW (ref 3.5–5.1)
POTASSIUM BLDV-SCNC: 3.4 MMOL/L — LOW (ref 3.5–5.1)
POTASSIUM BLDV-SCNC: 5.4 MMOL/L — HIGH (ref 3.5–5.1)
POTASSIUM BLDV-SCNC: 6.2 MMOL/L — CRITICAL HIGH (ref 3.5–5.1)
POTASSIUM SERPL-MCNC: 2.6 MMOL/L — CRITICAL LOW (ref 3.5–5.3)
POTASSIUM SERPL-MCNC: 2.8 MMOL/L — CRITICAL LOW (ref 3.5–5.3)
POTASSIUM SERPL-MCNC: 2.9 MMOL/L — CRITICAL LOW (ref 3.5–5.3)
POTASSIUM SERPL-MCNC: 3 MMOL/L — LOW (ref 3.5–5.3)
POTASSIUM SERPL-MCNC: 3.2 MMOL/L — LOW (ref 3.5–5.3)
POTASSIUM SERPL-MCNC: 3.2 MMOL/L — LOW (ref 3.5–5.3)
POTASSIUM SERPL-MCNC: 3.3 MMOL/L — LOW (ref 3.5–5.3)
POTASSIUM SERPL-MCNC: 3.4 MMOL/L — LOW (ref 3.5–5.3)
POTASSIUM SERPL-MCNC: 3.4 MMOL/L — LOW (ref 3.5–5.3)
POTASSIUM SERPL-MCNC: 3.5 MMOL/L — SIGNIFICANT CHANGE UP (ref 3.5–5.3)
POTASSIUM SERPL-MCNC: 3.6 MMOL/L — SIGNIFICANT CHANGE UP (ref 3.5–5.3)
POTASSIUM SERPL-MCNC: 3.6 MMOL/L — SIGNIFICANT CHANGE UP (ref 3.5–5.3)
POTASSIUM SERPL-MCNC: 3.7 MMOL/L — SIGNIFICANT CHANGE UP (ref 3.5–5.3)
POTASSIUM SERPL-MCNC: 4 MMOL/L — SIGNIFICANT CHANGE UP (ref 3.5–5.3)
POTASSIUM SERPL-MCNC: 4 MMOL/L — SIGNIFICANT CHANGE UP (ref 3.5–5.3)
POTASSIUM SERPL-MCNC: 4.6 MMOL/L — SIGNIFICANT CHANGE UP (ref 3.5–5.3)
POTASSIUM SERPL-MCNC: 4.8 MMOL/L — SIGNIFICANT CHANGE UP (ref 3.5–5.3)
POTASSIUM SERPL-MCNC: 5 MMOL/L — SIGNIFICANT CHANGE UP (ref 3.5–5.3)
POTASSIUM SERPL-MCNC: 5.2 MMOL/L — SIGNIFICANT CHANGE UP (ref 3.5–5.3)
POTASSIUM SERPL-MCNC: SIGNIFICANT CHANGE UP MMOL/L (ref 3.5–5.3)
POTASSIUM SERPL-SCNC: 2.6 MMOL/L — CRITICAL LOW (ref 3.5–5.3)
POTASSIUM SERPL-SCNC: 2.8 MMOL/L — CRITICAL LOW (ref 3.5–5.3)
POTASSIUM SERPL-SCNC: 2.9 MMOL/L — CRITICAL LOW (ref 3.5–5.3)
POTASSIUM SERPL-SCNC: 3 MMOL/L — LOW (ref 3.5–5.3)
POTASSIUM SERPL-SCNC: 3.2 MMOL/L — LOW (ref 3.5–5.3)
POTASSIUM SERPL-SCNC: 3.2 MMOL/L — LOW (ref 3.5–5.3)
POTASSIUM SERPL-SCNC: 3.3 MMOL/L — LOW (ref 3.5–5.3)
POTASSIUM SERPL-SCNC: 3.4 MMOL/L — LOW (ref 3.5–5.3)
POTASSIUM SERPL-SCNC: 3.4 MMOL/L — LOW (ref 3.5–5.3)
POTASSIUM SERPL-SCNC: 3.5 MMOL/L — SIGNIFICANT CHANGE UP (ref 3.5–5.3)
POTASSIUM SERPL-SCNC: 3.6 MMOL/L — SIGNIFICANT CHANGE UP (ref 3.5–5.3)
POTASSIUM SERPL-SCNC: 3.6 MMOL/L — SIGNIFICANT CHANGE UP (ref 3.5–5.3)
POTASSIUM SERPL-SCNC: 3.7 MMOL/L — SIGNIFICANT CHANGE UP (ref 3.5–5.3)
POTASSIUM SERPL-SCNC: 4 MMOL/L — SIGNIFICANT CHANGE UP (ref 3.5–5.3)
POTASSIUM SERPL-SCNC: 4 MMOL/L — SIGNIFICANT CHANGE UP (ref 3.5–5.3)
POTASSIUM SERPL-SCNC: 4.6 MMOL/L — SIGNIFICANT CHANGE UP (ref 3.5–5.3)
POTASSIUM SERPL-SCNC: 4.8 MMOL/L — SIGNIFICANT CHANGE UP (ref 3.5–5.3)
POTASSIUM SERPL-SCNC: 5 MMOL/L — SIGNIFICANT CHANGE UP (ref 3.5–5.3)
POTASSIUM SERPL-SCNC: 5.2 MMOL/L — SIGNIFICANT CHANGE UP (ref 3.5–5.3)
POTASSIUM SERPL-SCNC: SIGNIFICANT CHANGE UP MMOL/L (ref 3.5–5.3)
PROCALCITONIN SERPL-MCNC: 0.29 NG/ML — HIGH (ref 0.02–0.1)
PROCALCITONIN SERPL-MCNC: 0.67 NG/ML — HIGH (ref 0.02–0.1)
PROT SERPL-MCNC: 4.2 G/DL — LOW (ref 6–8.3)
PROT SERPL-MCNC: 4.5 G/DL — LOW (ref 6–8.3)
PROT SERPL-MCNC: 4.8 G/DL — LOW (ref 6–8.3)
PROT SERPL-MCNC: 4.8 G/DL — LOW (ref 6–8.3)
PROT SERPL-MCNC: 4.9 G/DL — LOW (ref 6–8.3)
PROT SERPL-MCNC: 4.9 G/DL — LOW (ref 6–8.3)
PROT SERPL-MCNC: 5.4 G/DL — LOW (ref 6–8.3)
PROT SERPL-MCNC: 5.9 G/DL — LOW (ref 6–8.3)
PROT SERPL-MCNC: 6.9 G/DL — SIGNIFICANT CHANGE UP (ref 6–8.3)
PROT SERPL-MCNC: SIGNIFICANT CHANGE UP G/DL (ref 6–8.3)
PROT UR-MCNC: ABNORMAL
PROTHROM AB SERPL-ACNC: 15.1 SEC — HIGH (ref 10.5–13.4)
PROTHROM AB SERPL-ACNC: 15.8 SEC — HIGH (ref 10.5–13.4)
PTH-INTACT FLD-MCNC: 67 PG/ML — HIGH (ref 15–65)
RAPID RVP RESULT: DETECTED
RBC # BLD: 3.42 M/UL — LOW (ref 3.8–5.2)
RBC # BLD: 3.49 M/UL — LOW (ref 3.8–5.2)
RBC # BLD: 3.89 M/UL — SIGNIFICANT CHANGE UP (ref 3.8–5.2)
RBC # BLD: 4.53 M/UL — SIGNIFICANT CHANGE UP (ref 3.8–5.2)
RBC # BLD: 4.81 M/UL — SIGNIFICANT CHANGE UP (ref 3.8–5.2)
RBC # BLD: 4.85 M/UL — SIGNIFICANT CHANGE UP (ref 3.8–5.2)
RBC # BLD: 4.93 M/UL — SIGNIFICANT CHANGE UP (ref 3.8–5.2)
RBC # BLD: 4.93 M/UL — SIGNIFICANT CHANGE UP (ref 3.8–5.2)
RBC # BLD: 4.96 M/UL — SIGNIFICANT CHANGE UP (ref 3.8–5.2)
RBC # BLD: 5.09 M/UL — SIGNIFICANT CHANGE UP (ref 3.8–5.2)
RBC # BLD: 5.12 M/UL — SIGNIFICANT CHANGE UP (ref 3.8–5.2)
RBC # BLD: 5.24 M/UL — HIGH (ref 3.8–5.2)
RBC # BLD: 5.3 M/UL — HIGH (ref 3.8–5.2)
RBC # BLD: 5.44 M/UL — HIGH (ref 3.8–5.2)
RBC # BLD: 5.71 M/UL — HIGH (ref 3.8–5.2)
RBC # BLD: 5.83 M/UL — HIGH (ref 3.8–5.2)
RBC # BLD: 5.97 M/UL — HIGH (ref 3.8–5.2)
RBC # FLD: 14.4 % — SIGNIFICANT CHANGE UP (ref 10.3–14.5)
RBC # FLD: 14.4 % — SIGNIFICANT CHANGE UP (ref 10.3–14.5)
RBC # FLD: 14.5 % — SIGNIFICANT CHANGE UP (ref 10.3–14.5)
RBC # FLD: 14.5 % — SIGNIFICANT CHANGE UP (ref 10.3–14.5)
RBC # FLD: 15.2 % — HIGH (ref 10.3–14.5)
RBC # FLD: 15.3 % — HIGH (ref 10.3–14.5)
RBC # FLD: 15.3 % — HIGH (ref 10.3–14.5)
RBC # FLD: 15.5 % — HIGH (ref 10.3–14.5)
RBC # FLD: 15.6 % — HIGH (ref 10.3–14.5)
RBC # FLD: 15.9 % — HIGH (ref 10.3–14.5)
RBC # FLD: 16 % — HIGH (ref 10.3–14.5)
RBC # FLD: 16.1 % — HIGH (ref 10.3–14.5)
RBC # FLD: 16.9 % — HIGH (ref 10.3–14.5)
RBC # FLD: 17.1 % — HIGH (ref 10.3–14.5)
RBC # FLD: 17.6 % — HIGH (ref 10.3–14.5)
RBC BLD AUTO: ABNORMAL
RBC CASTS # UR COMP ASSIST: 10 /HPF — HIGH (ref 0–4)
RENIN PLAS-CCNC: 0.43 NG/ML/HR — SIGNIFICANT CHANGE UP (ref 0.17–5.38)
RH IG SCN BLD-IMP: POSITIVE — SIGNIFICANT CHANGE UP
RSV RNA NPH QL NAA+NON-PROBE: SIGNIFICANT CHANGE UP
RSV RNA SPEC QL NAA+PROBE: SIGNIFICANT CHANGE UP
RV+EV RNA SPEC QL NAA+PROBE: SIGNIFICANT CHANGE UP
S AUREUS DNA NOSE QL NAA+PROBE: DETECTED
SAO2 % BLDV: 16.8 % — SIGNIFICANT CHANGE UP
SAO2 % BLDV: 29.4 % — SIGNIFICANT CHANGE UP
SAO2 % BLDV: 45.4 % — SIGNIFICANT CHANGE UP
SAO2 % BLDV: 48.2 % — SIGNIFICANT CHANGE UP
SAO2 % BLDV: 63.5 % — SIGNIFICANT CHANGE UP
SAO2 % BLDV: 78.3 % — SIGNIFICANT CHANGE UP
SARS-COV-2 RNA SPEC QL NAA+PROBE: DETECTED
SARS-COV-2 RNA SPEC QL NAA+PROBE: SIGNIFICANT CHANGE UP
SODIUM SERPL-SCNC: 141 MMOL/L — SIGNIFICANT CHANGE UP (ref 135–145)
SODIUM SERPL-SCNC: 142 MMOL/L — SIGNIFICANT CHANGE UP (ref 135–145)
SODIUM SERPL-SCNC: 145 MMOL/L — SIGNIFICANT CHANGE UP (ref 135–145)
SODIUM SERPL-SCNC: 145 MMOL/L — SIGNIFICANT CHANGE UP (ref 135–145)
SODIUM SERPL-SCNC: 146 MMOL/L — HIGH (ref 135–145)
SODIUM SERPL-SCNC: 149 MMOL/L — HIGH (ref 135–145)
SODIUM SERPL-SCNC: 149 MMOL/L — HIGH (ref 135–145)
SODIUM SERPL-SCNC: 150 MMOL/L — HIGH (ref 135–145)
SODIUM SERPL-SCNC: 150 MMOL/L — HIGH (ref 135–145)
SODIUM SERPL-SCNC: 151 MMOL/L — HIGH (ref 135–145)
SODIUM SERPL-SCNC: 151 MMOL/L — HIGH (ref 135–145)
SODIUM SERPL-SCNC: 152 MMOL/L — HIGH (ref 135–145)
SODIUM SERPL-SCNC: 152 MMOL/L — HIGH (ref 135–145)
SODIUM SERPL-SCNC: 153 MMOL/L — HIGH (ref 135–145)
SODIUM SERPL-SCNC: 156 MMOL/L — HIGH (ref 135–145)
SODIUM SERPL-SCNC: 158 MMOL/L — HIGH (ref 135–145)
SODIUM SERPL-SCNC: 159 MMOL/L — HIGH (ref 135–145)
SP GR SPEC: 1.02 — SIGNIFICANT CHANGE UP (ref 1.01–1.05)
SPECIMEN SOURCE: SIGNIFICANT CHANGE UP
T3 SERPL-MCNC: 67 NG/DL — LOW (ref 80–200)
T4 AB SER-ACNC: 5.35 UG/DL — SIGNIFICANT CHANGE UP (ref 5.1–13)
T4 FREE SERPL-MCNC: 0.8 NG/DL — LOW (ref 0.9–1.8)
THYROGLOB AB FLD IA-ACNC: <20 IU/ML — SIGNIFICANT CHANGE UP
THYROGLOB AB SERPL-ACNC: <20 IU/ML — SIGNIFICANT CHANGE UP
THYROGLOB SERPL-MCNC: 147 NG/ML — HIGH (ref 1.6–59.9)
THYROPEROXIDASE AB SERPL-ACNC: <10 IU/ML — SIGNIFICANT CHANGE UP
TROPONIN T, HIGH SENSITIVITY RESULT: 27 NG/L — SIGNIFICANT CHANGE UP
TROPONIN T, HIGH SENSITIVITY RESULT: 27 NG/L — SIGNIFICANT CHANGE UP
TROPONIN T, HIGH SENSITIVITY RESULT: 60 NG/L — CRITICAL HIGH
TROPONIN T, HIGH SENSITIVITY RESULT: 87 NG/L — CRITICAL HIGH
TROPONIN T, HIGH SENSITIVITY RESULT: 99 NG/L — CRITICAL HIGH
TSH RECEP AB FLD-ACNC: <1.1 IU/L — SIGNIFICANT CHANGE UP (ref 0–1.75)
TSH SERPL-MCNC: 0.15 UIU/ML — LOW (ref 0.27–4.2)
TSH SERPL-MCNC: 0.8 UIU/ML — SIGNIFICANT CHANGE UP (ref 0.27–4.2)
TSI ACT/NOR SER: <0.1 IU/L — SIGNIFICANT CHANGE UP (ref 0–0.55)
TSI ACT/NOR SER: <0.1 IU/L — SIGNIFICANT CHANGE UP (ref 0–0.55)
UROBILINOGEN FLD QL: SIGNIFICANT CHANGE UP
VANCOMYCIN TROUGH SERPL-MCNC: 9.6 UG/ML — LOW (ref 10–20)
VARIANT LYMPHS # BLD: 2 % — SIGNIFICANT CHANGE UP (ref 0–6)
VIT D25+D1,25 OH+D1,25 PNL SERPL-MCNC: 103 PG/ML — HIGH (ref 19.9–79.3)
VIT D25+D1,25 OH+D1,25 PNL SERPL-MCNC: 58.2 PG/ML — SIGNIFICANT CHANGE UP (ref 19.9–79.3)
WBC # BLD: 10.64 K/UL — HIGH (ref 3.8–10.5)
WBC # BLD: 10.96 K/UL — HIGH (ref 3.8–10.5)
WBC # BLD: 11.35 K/UL — HIGH (ref 3.8–10.5)
WBC # BLD: 11.53 K/UL — HIGH (ref 3.8–10.5)
WBC # BLD: 12.75 K/UL — HIGH (ref 3.8–10.5)
WBC # BLD: 5.39 K/UL — SIGNIFICANT CHANGE UP (ref 3.8–10.5)
WBC # BLD: 6.13 K/UL — SIGNIFICANT CHANGE UP (ref 3.8–10.5)
WBC # BLD: 6.31 K/UL — SIGNIFICANT CHANGE UP (ref 3.8–10.5)
WBC # BLD: 7.12 K/UL — SIGNIFICANT CHANGE UP (ref 3.8–10.5)
WBC # BLD: 7.54 K/UL — SIGNIFICANT CHANGE UP (ref 3.8–10.5)
WBC # BLD: 7.7 K/UL — SIGNIFICANT CHANGE UP (ref 3.8–10.5)
WBC # BLD: 8 K/UL — SIGNIFICANT CHANGE UP (ref 3.8–10.5)
WBC # BLD: 8.29 K/UL — SIGNIFICANT CHANGE UP (ref 3.8–10.5)
WBC # BLD: 9.03 K/UL — SIGNIFICANT CHANGE UP (ref 3.8–10.5)
WBC # BLD: 9.29 K/UL — SIGNIFICANT CHANGE UP (ref 3.8–10.5)
WBC # BLD: 9.32 K/UL — SIGNIFICANT CHANGE UP (ref 3.8–10.5)
WBC # BLD: 9.52 K/UL — SIGNIFICANT CHANGE UP (ref 3.8–10.5)
WBC # FLD AUTO: 10.64 K/UL — HIGH (ref 3.8–10.5)
WBC # FLD AUTO: 10.96 K/UL — HIGH (ref 3.8–10.5)
WBC # FLD AUTO: 11.35 K/UL — HIGH (ref 3.8–10.5)
WBC # FLD AUTO: 11.53 K/UL — HIGH (ref 3.8–10.5)
WBC # FLD AUTO: 12.75 K/UL — HIGH (ref 3.8–10.5)
WBC # FLD AUTO: 5.39 K/UL — SIGNIFICANT CHANGE UP (ref 3.8–10.5)
WBC # FLD AUTO: 6.13 K/UL — SIGNIFICANT CHANGE UP (ref 3.8–10.5)
WBC # FLD AUTO: 6.31 K/UL — SIGNIFICANT CHANGE UP (ref 3.8–10.5)
WBC # FLD AUTO: 7.12 K/UL — SIGNIFICANT CHANGE UP (ref 3.8–10.5)
WBC # FLD AUTO: 7.54 K/UL — SIGNIFICANT CHANGE UP (ref 3.8–10.5)
WBC # FLD AUTO: 7.7 K/UL — SIGNIFICANT CHANGE UP (ref 3.8–10.5)
WBC # FLD AUTO: 8 K/UL — SIGNIFICANT CHANGE UP (ref 3.8–10.5)
WBC # FLD AUTO: 8.29 K/UL — SIGNIFICANT CHANGE UP (ref 3.8–10.5)
WBC # FLD AUTO: 9.03 K/UL — SIGNIFICANT CHANGE UP (ref 3.8–10.5)
WBC # FLD AUTO: 9.29 K/UL — SIGNIFICANT CHANGE UP (ref 3.8–10.5)
WBC # FLD AUTO: 9.32 K/UL — SIGNIFICANT CHANGE UP (ref 3.8–10.5)
WBC # FLD AUTO: 9.52 K/UL — SIGNIFICANT CHANGE UP (ref 3.8–10.5)
WBC UR QL: 14 /HPF — HIGH (ref 0–5)

## 2022-01-01 PROCEDURE — 99222 1ST HOSP IP/OBS MODERATE 55: CPT | Mod: GC

## 2022-01-01 PROCEDURE — 99223 1ST HOSP IP/OBS HIGH 75: CPT

## 2022-01-01 PROCEDURE — 71275 CT ANGIOGRAPHY CHEST: CPT | Mod: 26

## 2022-01-01 PROCEDURE — 99223 1ST HOSP IP/OBS HIGH 75: CPT | Mod: GC

## 2022-01-01 PROCEDURE — 99231 SBSQ HOSP IP/OBS SF/LOW 25: CPT | Mod: GC

## 2022-01-01 PROCEDURE — 93010 ELECTROCARDIOGRAM REPORT: CPT

## 2022-01-01 PROCEDURE — 71045 X-RAY EXAM CHEST 1 VIEW: CPT | Mod: 26

## 2022-01-01 PROCEDURE — 76705 ECHO EXAM OF ABDOMEN: CPT | Mod: 26

## 2022-01-01 PROCEDURE — 99232 SBSQ HOSP IP/OBS MODERATE 35: CPT

## 2022-01-01 PROCEDURE — 71260 CT THORAX DX C+: CPT | Mod: 26,MB

## 2022-01-01 PROCEDURE — 99231 SBSQ HOSP IP/OBS SF/LOW 25: CPT

## 2022-01-01 PROCEDURE — 99233 SBSQ HOSP IP/OBS HIGH 50: CPT

## 2022-01-01 PROCEDURE — 70491 CT SOFT TISSUE NECK W/DYE: CPT | Mod: 26,MB

## 2022-01-01 PROCEDURE — 99291 CRITICAL CARE FIRST HOUR: CPT

## 2022-01-01 PROCEDURE — 93970 EXTREMITY STUDY: CPT | Mod: 26

## 2022-01-01 PROCEDURE — 70450 CT HEAD/BRAIN W/O DYE: CPT | Mod: 26,MA

## 2022-01-01 PROCEDURE — 99285 EMERGENCY DEPT VISIT HI MDM: CPT

## 2022-01-01 PROCEDURE — 74174 CTA ABD&PLVS W/CONTRAST: CPT | Mod: 26,MA

## 2022-01-01 PROCEDURE — 90792 PSYCH DIAG EVAL W/MED SRVCS: CPT

## 2022-01-01 RX ORDER — POTASSIUM CHLORIDE 20 MEQ
10 PACKET (EA) ORAL
Refills: 0 | Status: COMPLETED | OUTPATIENT
Start: 2022-01-01 | End: 2022-01-01

## 2022-01-01 RX ORDER — POTASSIUM PHOSPHATE, MONOBASIC POTASSIUM PHOSPHATE, DIBASIC 236; 224 MG/ML; MG/ML
30 INJECTION, SOLUTION INTRAVENOUS ONCE
Refills: 0 | Status: COMPLETED | OUTPATIENT
Start: 2022-01-01 | End: 2022-01-01

## 2022-01-01 RX ORDER — SODIUM CHLORIDE 9 MG/ML
1000 INJECTION, SOLUTION INTRAVENOUS
Refills: 0 | Status: DISCONTINUED | OUTPATIENT
Start: 2022-01-01 | End: 2022-01-01

## 2022-01-01 RX ORDER — AZITHROMYCIN 500 MG/1
TABLET, FILM COATED ORAL
Refills: 0 | Status: DISCONTINUED | OUTPATIENT
Start: 2022-01-01 | End: 2022-01-01

## 2022-01-01 RX ORDER — CEFEPIME 1 G/1
1000 INJECTION, POWDER, FOR SOLUTION INTRAMUSCULAR; INTRAVENOUS EVERY 24 HOURS
Refills: 0 | Status: DISCONTINUED | OUTPATIENT
Start: 2022-01-01 | End: 2022-01-01

## 2022-01-01 RX ORDER — SODIUM CHLORIDE 9 MG/ML
500 INJECTION INTRAMUSCULAR; INTRAVENOUS; SUBCUTANEOUS ONCE
Refills: 0 | Status: COMPLETED | OUTPATIENT
Start: 2022-01-01 | End: 2022-01-01

## 2022-01-01 RX ORDER — FUROSEMIDE 40 MG
20 TABLET ORAL ONCE
Refills: 0 | Status: COMPLETED | OUTPATIENT
Start: 2022-01-01 | End: 2022-01-01

## 2022-01-01 RX ORDER — PANTOPRAZOLE SODIUM 20 MG/1
40 TABLET, DELAYED RELEASE ORAL DAILY
Refills: 0 | Status: DISCONTINUED | OUTPATIENT
Start: 2022-01-01 | End: 2022-01-01

## 2022-01-01 RX ORDER — DEXAMETHASONE 0.5 MG/5ML
6 ELIXIR ORAL DAILY
Refills: 0 | Status: COMPLETED | OUTPATIENT
Start: 2022-01-01 | End: 2022-01-01

## 2022-01-01 RX ORDER — REMDESIVIR 5 MG/ML
100 INJECTION INTRAVENOUS EVERY 24 HOURS
Refills: 0 | Status: COMPLETED | OUTPATIENT
Start: 2022-01-01 | End: 2022-01-01

## 2022-01-01 RX ORDER — HEPARIN SODIUM 5000 [USP'U]/ML
5000 INJECTION INTRAVENOUS; SUBCUTANEOUS EVERY 8 HOURS
Refills: 0 | Status: DISCONTINUED | OUTPATIENT
Start: 2022-01-01 | End: 2022-01-01

## 2022-01-01 RX ORDER — SODIUM CHLORIDE 9 MG/ML
1000 INJECTION, SOLUTION INTRAVENOUS ONCE
Refills: 0 | Status: COMPLETED | OUTPATIENT
Start: 2022-01-01 | End: 2022-01-01

## 2022-01-01 RX ORDER — CEFEPIME 1 G/1
1000 INJECTION, POWDER, FOR SOLUTION INTRAMUSCULAR; INTRAVENOUS EVERY 12 HOURS
Refills: 0 | Status: DISCONTINUED | OUTPATIENT
Start: 2022-01-01 | End: 2022-01-01

## 2022-01-01 RX ORDER — SODIUM CHLORIDE 9 MG/ML
1000 INJECTION INTRAMUSCULAR; INTRAVENOUS; SUBCUTANEOUS ONCE
Refills: 0 | Status: COMPLETED | OUTPATIENT
Start: 2022-01-01 | End: 2022-01-01

## 2022-01-01 RX ORDER — POTASSIUM CHLORIDE 20 MEQ
40 PACKET (EA) ORAL ONCE
Refills: 0 | Status: COMPLETED | OUTPATIENT
Start: 2022-01-01 | End: 2022-01-01

## 2022-01-01 RX ORDER — FAMOTIDINE 10 MG/ML
20 INJECTION INTRAVENOUS ONCE
Refills: 0 | Status: COMPLETED | OUTPATIENT
Start: 2022-01-01 | End: 2022-01-01

## 2022-01-01 RX ORDER — REMDESIVIR 5 MG/ML
INJECTION INTRAVENOUS
Refills: 0 | Status: COMPLETED | OUTPATIENT
Start: 2022-01-01 | End: 2022-01-01

## 2022-01-01 RX ORDER — GLUCAGON INJECTION, SOLUTION 0.5 MG/.1ML
1 INJECTION, SOLUTION SUBCUTANEOUS ONCE
Refills: 0 | Status: DISCONTINUED | OUTPATIENT
Start: 2022-01-01 | End: 2022-01-01

## 2022-01-01 RX ORDER — DIAZEPAM 5 MG
1 TABLET ORAL
Qty: 0 | Refills: 0 | DISCHARGE

## 2022-01-01 RX ORDER — SODIUM,POTASSIUM PHOSPHATES 278-250MG
1 POWDER IN PACKET (EA) ORAL ONCE
Refills: 0 | Status: COMPLETED | OUTPATIENT
Start: 2022-01-01 | End: 2022-01-01

## 2022-01-01 RX ORDER — MIDODRINE HYDROCHLORIDE 2.5 MG/1
10 TABLET ORAL ONCE
Refills: 0 | Status: COMPLETED | OUTPATIENT
Start: 2022-01-01 | End: 2022-01-01

## 2022-01-01 RX ORDER — LOSARTAN POTASSIUM 100 MG/1
100 TABLET, FILM COATED ORAL DAILY
Refills: 0 | Status: DISCONTINUED | OUTPATIENT
Start: 2022-01-01 | End: 2022-01-01

## 2022-01-01 RX ORDER — ACETAMINOPHEN 500 MG
1000 TABLET ORAL ONCE
Refills: 0 | Status: COMPLETED | OUTPATIENT
Start: 2022-01-01 | End: 2022-01-01

## 2022-01-01 RX ORDER — POTASSIUM PHOSPHATE, MONOBASIC POTASSIUM PHOSPHATE, DIBASIC 236; 224 MG/ML; MG/ML
15 INJECTION, SOLUTION INTRAVENOUS ONCE
Refills: 0 | Status: COMPLETED | OUTPATIENT
Start: 2022-01-01 | End: 2022-01-01

## 2022-01-01 RX ORDER — POTASSIUM CHLORIDE 20 MEQ
40 PACKET (EA) ORAL ONCE
Refills: 0 | Status: DISCONTINUED | OUTPATIENT
Start: 2022-01-01 | End: 2022-01-01

## 2022-01-01 RX ORDER — ACETAMINOPHEN 500 MG
975 TABLET ORAL ONCE
Refills: 0 | Status: COMPLETED | OUTPATIENT
Start: 2022-01-01 | End: 2022-01-01

## 2022-01-01 RX ORDER — ATORVASTATIN CALCIUM 80 MG/1
10 TABLET, FILM COATED ORAL AT BEDTIME
Refills: 0 | Status: DISCONTINUED | OUTPATIENT
Start: 2022-01-01 | End: 2022-01-01

## 2022-01-01 RX ORDER — DEXTROSE 50 % IN WATER 50 %
15 SYRINGE (ML) INTRAVENOUS ONCE
Refills: 0 | Status: DISCONTINUED | OUTPATIENT
Start: 2022-01-01 | End: 2022-01-01

## 2022-01-01 RX ORDER — ASPIRIN/CALCIUM CARB/MAGNESIUM 324 MG
1 TABLET ORAL
Qty: 0 | Refills: 0 | DISCHARGE

## 2022-01-01 RX ORDER — ATORVASTATIN CALCIUM 80 MG/1
1 TABLET, FILM COATED ORAL
Qty: 0 | Refills: 0 | DISCHARGE

## 2022-01-01 RX ORDER — MUPIROCIN 20 MG/G
1 OINTMENT TOPICAL
Refills: 0 | Status: DISCONTINUED | OUTPATIENT
Start: 2022-01-01 | End: 2022-01-01

## 2022-01-01 RX ORDER — ALBUTEROL 90 UG/1
2 AEROSOL, METERED ORAL EVERY 6 HOURS
Refills: 0 | Status: DISCONTINUED | OUTPATIENT
Start: 2022-01-01 | End: 2022-01-01

## 2022-01-01 RX ORDER — VANCOMYCIN HCL 1 G
1000 VIAL (EA) INTRAVENOUS ONCE
Refills: 0 | Status: COMPLETED | OUTPATIENT
Start: 2022-01-01 | End: 2022-01-01

## 2022-01-01 RX ORDER — DEXTROSE 50 % IN WATER 50 %
12.5 SYRINGE (ML) INTRAVENOUS ONCE
Refills: 0 | Status: DISCONTINUED | OUTPATIENT
Start: 2022-01-01 | End: 2022-01-01

## 2022-01-01 RX ORDER — CHLORHEXIDINE GLUCONATE 213 G/1000ML
1 SOLUTION TOPICAL DAILY
Refills: 0 | Status: DISCONTINUED | OUTPATIENT
Start: 2022-01-01 | End: 2022-01-01

## 2022-01-01 RX ORDER — ALBUTEROL 90 UG/1
2 AEROSOL, METERED ORAL ONCE
Refills: 0 | Status: COMPLETED | OUTPATIENT
Start: 2022-01-01 | End: 2022-01-01

## 2022-01-01 RX ORDER — CEFEPIME 1 G/1
2000 INJECTION, POWDER, FOR SOLUTION INTRAMUSCULAR; INTRAVENOUS ONCE
Refills: 0 | Status: COMPLETED | OUTPATIENT
Start: 2022-01-01 | End: 2022-01-01

## 2022-01-01 RX ORDER — NOREPINEPHRINE BITARTRATE/D5W 8 MG/250ML
0.05 PLASTIC BAG, INJECTION (ML) INTRAVENOUS
Qty: 8 | Refills: 0 | Status: DISCONTINUED | OUTPATIENT
Start: 2022-01-01 | End: 2022-01-01

## 2022-01-01 RX ORDER — MAGNESIUM SULFATE 500 MG/ML
1 VIAL (ML) INJECTION ONCE
Refills: 0 | Status: COMPLETED | OUTPATIENT
Start: 2022-01-01 | End: 2022-01-01

## 2022-01-01 RX ORDER — CEFEPIME 1 G/1
INJECTION, POWDER, FOR SOLUTION INTRAMUSCULAR; INTRAVENOUS
Refills: 0 | Status: DISCONTINUED | OUTPATIENT
Start: 2022-01-01 | End: 2022-01-01

## 2022-01-01 RX ORDER — MAGNESIUM SULFATE 500 MG/ML
2 VIAL (ML) INJECTION ONCE
Refills: 0 | Status: COMPLETED | OUTPATIENT
Start: 2022-01-01 | End: 2022-01-01

## 2022-01-01 RX ORDER — DEXTROSE 50 % IN WATER 50 %
25 SYRINGE (ML) INTRAVENOUS ONCE
Refills: 0 | Status: DISCONTINUED | OUTPATIENT
Start: 2022-01-01 | End: 2022-01-01

## 2022-01-01 RX ORDER — METRONIDAZOLE 500 MG
500 TABLET ORAL EVERY 8 HOURS
Refills: 0 | Status: DISCONTINUED | OUTPATIENT
Start: 2022-01-01 | End: 2022-01-01

## 2022-01-01 RX ORDER — ENOXAPARIN SODIUM 100 MG/ML
40 INJECTION SUBCUTANEOUS EVERY 24 HOURS
Refills: 0 | Status: DISCONTINUED | OUTPATIENT
Start: 2022-01-01 | End: 2022-01-01

## 2022-01-01 RX ORDER — INSULIN LISPRO 100/ML
VIAL (ML) SUBCUTANEOUS EVERY 6 HOURS
Refills: 0 | Status: DISCONTINUED | OUTPATIENT
Start: 2022-01-01 | End: 2022-01-01

## 2022-01-01 RX ORDER — SODIUM CHLORIDE 9 MG/ML
1000 INJECTION INTRAMUSCULAR; INTRAVENOUS; SUBCUTANEOUS
Refills: 0 | Status: DISCONTINUED | OUTPATIENT
Start: 2022-01-01 | End: 2022-01-01

## 2022-01-01 RX ORDER — DEXAMETHASONE 0.5 MG/5ML
1 ELIXIR ORAL ONCE
Refills: 0 | Status: COMPLETED | OUTPATIENT
Start: 2022-01-01 | End: 2022-01-01

## 2022-01-01 RX ORDER — REMDESIVIR 5 MG/ML
200 INJECTION INTRAVENOUS EVERY 24 HOURS
Refills: 0 | Status: COMPLETED | OUTPATIENT
Start: 2022-01-01 | End: 2022-01-01

## 2022-01-01 RX ORDER — AZITHROMYCIN 500 MG/1
500 TABLET, FILM COATED ORAL ONCE
Refills: 0 | Status: COMPLETED | OUTPATIENT
Start: 2022-01-01 | End: 2022-01-01

## 2022-01-01 RX ORDER — CEFEPIME 1 G/1
2000 INJECTION, POWDER, FOR SOLUTION INTRAMUSCULAR; INTRAVENOUS ONCE
Refills: 0 | Status: DISCONTINUED | OUTPATIENT
Start: 2022-01-01 | End: 2022-01-01

## 2022-01-01 RX ORDER — VITAMIN E 100 UNIT
1 CAPSULE ORAL
Qty: 0 | Refills: 0 | DISCHARGE

## 2022-01-01 RX ORDER — POTASSIUM CHLORIDE 20 MEQ
10 PACKET (EA) ORAL
Refills: 0 | Status: DISCONTINUED | OUTPATIENT
Start: 2022-01-01 | End: 2022-01-01

## 2022-01-01 RX ORDER — BUDESONIDE AND FORMOTEROL FUMARATE DIHYDRATE 160; 4.5 UG/1; UG/1
2 AEROSOL RESPIRATORY (INHALATION)
Refills: 0 | Status: DISCONTINUED | OUTPATIENT
Start: 2022-01-01 | End: 2022-01-01

## 2022-01-01 RX ORDER — LOSARTAN POTASSIUM 100 MG/1
1 TABLET, FILM COATED ORAL
Qty: 0 | Refills: 0 | DISCHARGE

## 2022-01-01 RX ORDER — SODIUM BICARBONATE 1 MEQ/ML
0.12 SYRINGE (ML) INTRAVENOUS
Qty: 50 | Refills: 0 | Status: DISCONTINUED | OUTPATIENT
Start: 2022-01-01 | End: 2022-01-01

## 2022-01-01 RX ADMIN — Medication 100 MILLIEQUIVALENT(S): at 17:29

## 2022-01-01 RX ADMIN — Medication 100 MILLIEQUIVALENT(S): at 06:47

## 2022-01-01 RX ADMIN — Medication 6 MILLIGRAM(S): at 05:02

## 2022-01-01 RX ADMIN — Medication 100 MILLIEQUIVALENT(S): at 01:47

## 2022-01-01 RX ADMIN — Medication 6 MILLIGRAM(S): at 05:19

## 2022-01-01 RX ADMIN — CEFEPIME 100 MILLIGRAM(S): 1 INJECTION, POWDER, FOR SOLUTION INTRAMUSCULAR; INTRAVENOUS at 13:37

## 2022-01-01 RX ADMIN — SODIUM CHLORIDE 65 MILLILITER(S): 9 INJECTION, SOLUTION INTRAVENOUS at 21:15

## 2022-01-01 RX ADMIN — Medication 40 MILLIEQUIVALENT(S): at 05:42

## 2022-01-01 RX ADMIN — CHLORHEXIDINE GLUCONATE 1 APPLICATION(S): 213 SOLUTION TOPICAL at 13:34

## 2022-01-01 RX ADMIN — Medication 100 MILLIEQUIVALENT(S): at 22:23

## 2022-01-01 RX ADMIN — CEFEPIME 100 MILLIGRAM(S): 1 INJECTION, POWDER, FOR SOLUTION INTRAMUSCULAR; INTRAVENOUS at 15:23

## 2022-01-01 RX ADMIN — SODIUM CHLORIDE 65 MILLILITER(S): 9 INJECTION, SOLUTION INTRAVENOUS at 10:23

## 2022-01-01 RX ADMIN — Medication 100 MILLIEQUIVALENT(S): at 00:48

## 2022-01-01 RX ADMIN — BUDESONIDE AND FORMOTEROL FUMARATE DIHYDRATE 2 PUFF(S): 160; 4.5 AEROSOL RESPIRATORY (INHALATION) at 09:30

## 2022-01-01 RX ADMIN — Medication 100 MILLIEQUIVALENT(S): at 12:26

## 2022-01-01 RX ADMIN — Medication 1 PACKET(S): at 12:26

## 2022-01-01 RX ADMIN — Medication 100 MILLIEQUIVALENT(S): at 20:42

## 2022-01-01 RX ADMIN — Medication 250 MILLIGRAM(S): at 15:42

## 2022-01-01 RX ADMIN — MUPIROCIN 1 APPLICATION(S): 20 OINTMENT TOPICAL at 18:29

## 2022-01-01 RX ADMIN — Medication 100 MILLIGRAM(S): at 22:09

## 2022-01-01 RX ADMIN — Medication 100 MILLIGRAM(S): at 14:29

## 2022-01-01 RX ADMIN — ENOXAPARIN SODIUM 40 MILLIGRAM(S): 100 INJECTION SUBCUTANEOUS at 17:56

## 2022-01-01 RX ADMIN — CEFEPIME 100 MILLIGRAM(S): 1 INJECTION, POWDER, FOR SOLUTION INTRAMUSCULAR; INTRAVENOUS at 02:08

## 2022-01-01 RX ADMIN — Medication 6 MILLIGRAM(S): at 07:02

## 2022-01-01 RX ADMIN — Medication 100 MILLIEQUIVALENT(S): at 16:13

## 2022-01-01 RX ADMIN — Medication 1: at 06:11

## 2022-01-01 RX ADMIN — BUDESONIDE AND FORMOTEROL FUMARATE DIHYDRATE 2 PUFF(S): 160; 4.5 AEROSOL RESPIRATORY (INHALATION) at 12:10

## 2022-01-01 RX ADMIN — HEPARIN SODIUM 5000 UNIT(S): 5000 INJECTION INTRAVENOUS; SUBCUTANEOUS at 05:15

## 2022-01-01 RX ADMIN — BUDESONIDE AND FORMOTEROL FUMARATE DIHYDRATE 2 PUFF(S): 160; 4.5 AEROSOL RESPIRATORY (INHALATION) at 09:02

## 2022-01-01 RX ADMIN — Medication 100 MILLIEQUIVALENT(S): at 13:04

## 2022-01-01 RX ADMIN — SODIUM CHLORIDE 50 MILLILITER(S): 9 INJECTION, SOLUTION INTRAVENOUS at 18:11

## 2022-01-01 RX ADMIN — Medication 6 MILLIGRAM(S): at 05:30

## 2022-01-01 RX ADMIN — HEPARIN SODIUM 5000 UNIT(S): 5000 INJECTION INTRAVENOUS; SUBCUTANEOUS at 15:51

## 2022-01-01 RX ADMIN — Medication 100 MILLIEQUIVALENT(S): at 05:38

## 2022-01-01 RX ADMIN — Medication 100 MILLIGRAM(S): at 22:28

## 2022-01-01 RX ADMIN — Medication 40 MILLIEQUIVALENT(S): at 12:21

## 2022-01-01 RX ADMIN — HEPARIN SODIUM 5000 UNIT(S): 5000 INJECTION INTRAVENOUS; SUBCUTANEOUS at 05:03

## 2022-01-01 RX ADMIN — Medication 100 MILLIGRAM(S): at 05:03

## 2022-01-01 RX ADMIN — SODIUM CHLORIDE 1000 MILLILITER(S): 9 INJECTION INTRAMUSCULAR; INTRAVENOUS; SUBCUTANEOUS at 13:45

## 2022-01-01 RX ADMIN — Medication 100 MILLIGRAM(S): at 05:09

## 2022-01-01 RX ADMIN — Medication 100 MILLIEQUIVALENT(S): at 02:56

## 2022-01-01 RX ADMIN — CEFEPIME 100 MILLIGRAM(S): 1 INJECTION, POWDER, FOR SOLUTION INTRAMUSCULAR; INTRAVENOUS at 15:46

## 2022-01-01 RX ADMIN — REMDESIVIR 500 MILLIGRAM(S): 5 INJECTION INTRAVENOUS at 19:43

## 2022-01-01 RX ADMIN — CEFEPIME 100 MILLIGRAM(S): 1 INJECTION, POWDER, FOR SOLUTION INTRAMUSCULAR; INTRAVENOUS at 14:39

## 2022-01-01 RX ADMIN — Medication 6 MILLIGRAM(S): at 05:21

## 2022-01-01 RX ADMIN — Medication 4: at 13:30

## 2022-01-01 RX ADMIN — HEPARIN SODIUM 5000 UNIT(S): 5000 INJECTION INTRAVENOUS; SUBCUTANEOUS at 22:31

## 2022-01-01 RX ADMIN — SODIUM CHLORIDE 65 MILLILITER(S): 9 INJECTION, SOLUTION INTRAVENOUS at 00:30

## 2022-01-01 RX ADMIN — SODIUM CHLORIDE 65 MILLILITER(S): 9 INJECTION, SOLUTION INTRAVENOUS at 12:09

## 2022-01-01 RX ADMIN — CEFEPIME 100 MILLIGRAM(S): 1 INJECTION, POWDER, FOR SOLUTION INTRAMUSCULAR; INTRAVENOUS at 01:03

## 2022-01-01 RX ADMIN — LOSARTAN POTASSIUM 100 MILLIGRAM(S): 100 TABLET, FILM COATED ORAL at 06:29

## 2022-01-01 RX ADMIN — POTASSIUM PHOSPHATE, MONOBASIC POTASSIUM PHOSPHATE, DIBASIC 62.5 MILLIMOLE(S): 236; 224 INJECTION, SOLUTION INTRAVENOUS at 10:17

## 2022-01-01 RX ADMIN — Medication 100 MILLIEQUIVALENT(S): at 10:40

## 2022-01-01 RX ADMIN — Medication 1 MILLIGRAM(S): at 21:50

## 2022-01-01 RX ADMIN — ATORVASTATIN CALCIUM 10 MILLIGRAM(S): 80 TABLET, FILM COATED ORAL at 22:25

## 2022-01-01 RX ADMIN — SODIUM CHLORIDE 65 MILLILITER(S): 9 INJECTION, SOLUTION INTRAVENOUS at 13:36

## 2022-01-01 RX ADMIN — Medication 100 MILLIGRAM(S): at 05:14

## 2022-01-01 RX ADMIN — Medication 100 MILLIGRAM(S): at 05:21

## 2022-01-01 RX ADMIN — BUDESONIDE AND FORMOTEROL FUMARATE DIHYDRATE 2 PUFF(S): 160; 4.5 AEROSOL RESPIRATORY (INHALATION) at 20:21

## 2022-01-01 RX ADMIN — SODIUM CHLORIDE 75 MILLILITER(S): 9 INJECTION, SOLUTION INTRAVENOUS at 11:11

## 2022-01-01 RX ADMIN — Medication 100 MILLIEQUIVALENT(S): at 08:54

## 2022-01-01 RX ADMIN — Medication 6 MILLIGRAM(S): at 06:49

## 2022-01-01 RX ADMIN — Medication 6 MILLIGRAM(S): at 05:24

## 2022-01-01 RX ADMIN — Medication 100 MILLIEQUIVALENT(S): at 12:43

## 2022-01-01 RX ADMIN — LOSARTAN POTASSIUM 100 MILLIGRAM(S): 100 TABLET, FILM COATED ORAL at 05:53

## 2022-01-01 RX ADMIN — Medication 6 MILLIGRAM(S): at 06:12

## 2022-01-01 RX ADMIN — Medication 25 GRAM(S): at 08:55

## 2022-01-01 RX ADMIN — POTASSIUM PHOSPHATE, MONOBASIC POTASSIUM PHOSPHATE, DIBASIC 62.5 MILLIMOLE(S): 236; 224 INJECTION, SOLUTION INTRAVENOUS at 10:09

## 2022-01-01 RX ADMIN — Medication 20 MILLIGRAM(S): at 22:34

## 2022-01-01 RX ADMIN — ENOXAPARIN SODIUM 40 MILLIGRAM(S): 100 INJECTION SUBCUTANEOUS at 05:53

## 2022-01-01 RX ADMIN — Medication 20 MILLIGRAM(S): at 12:45

## 2022-01-01 RX ADMIN — Medication 100 MILLIGRAM(S): at 21:51

## 2022-01-01 RX ADMIN — SODIUM CHLORIDE 1000 MILLILITER(S): 9 INJECTION, SOLUTION INTRAVENOUS at 18:47

## 2022-01-01 RX ADMIN — SODIUM CHLORIDE 500 MILLILITER(S): 9 INJECTION INTRAMUSCULAR; INTRAVENOUS; SUBCUTANEOUS at 15:44

## 2022-01-01 RX ADMIN — Medication 100 MILLIEQUIVALENT(S): at 18:07

## 2022-01-01 RX ADMIN — SODIUM CHLORIDE 50 MILLILITER(S): 9 INJECTION, SOLUTION INTRAVENOUS at 21:31

## 2022-01-01 RX ADMIN — SODIUM CHLORIDE 75 MILLILITER(S): 9 INJECTION, SOLUTION INTRAVENOUS at 23:13

## 2022-01-01 RX ADMIN — HEPARIN SODIUM 5000 UNIT(S): 5000 INJECTION INTRAVENOUS; SUBCUTANEOUS at 05:22

## 2022-01-01 RX ADMIN — SODIUM CHLORIDE 65 MILLILITER(S): 9 INJECTION, SOLUTION INTRAVENOUS at 08:49

## 2022-01-01 RX ADMIN — Medication 1000 MILLIGRAM(S): at 16:02

## 2022-01-01 RX ADMIN — Medication 100 MILLIGRAM(S): at 15:48

## 2022-01-01 RX ADMIN — ENOXAPARIN SODIUM 40 MILLIGRAM(S): 100 INJECTION SUBCUTANEOUS at 16:45

## 2022-01-01 RX ADMIN — Medication 1: at 00:46

## 2022-01-01 RX ADMIN — ATORVASTATIN CALCIUM 10 MILLIGRAM(S): 80 TABLET, FILM COATED ORAL at 21:16

## 2022-01-01 RX ADMIN — ALBUTEROL 2 PUFF(S): 90 AEROSOL, METERED ORAL at 21:52

## 2022-01-01 RX ADMIN — SODIUM CHLORIDE 500 MILLILITER(S): 9 INJECTION INTRAMUSCULAR; INTRAVENOUS; SUBCUTANEOUS at 18:44

## 2022-01-01 RX ADMIN — CEFEPIME 100 MILLIGRAM(S): 1 INJECTION, POWDER, FOR SOLUTION INTRAMUSCULAR; INTRAVENOUS at 14:50

## 2022-01-01 RX ADMIN — Medication 100 GRAM(S): at 09:50

## 2022-01-01 RX ADMIN — MUPIROCIN 1 APPLICATION(S): 20 OINTMENT TOPICAL at 13:36

## 2022-01-01 RX ADMIN — Medication 2: at 02:29

## 2022-01-01 RX ADMIN — Medication 100 MILLIEQUIVALENT(S): at 21:26

## 2022-01-01 RX ADMIN — Medication 100 MILLIEQUIVALENT(S): at 12:46

## 2022-01-01 RX ADMIN — POTASSIUM PHOSPHATE, MONOBASIC POTASSIUM PHOSPHATE, DIBASIC 83.33 MILLIMOLE(S): 236; 224 INJECTION, SOLUTION INTRAVENOUS at 14:36

## 2022-01-01 RX ADMIN — CHLORHEXIDINE GLUCONATE 1 APPLICATION(S): 213 SOLUTION TOPICAL at 13:31

## 2022-01-01 RX ADMIN — Medication 100 MILLIEQUIVALENT(S): at 14:38

## 2022-01-01 RX ADMIN — Medication 100 MILLIEQUIVALENT(S): at 00:28

## 2022-01-01 RX ADMIN — Medication 100 MILLIEQUIVALENT(S): at 10:58

## 2022-01-01 RX ADMIN — REMDESIVIR 500 MILLIGRAM(S): 5 INJECTION INTRAVENOUS at 17:59

## 2022-01-01 RX ADMIN — Medication 100 MILLIEQUIVALENT(S): at 09:45

## 2022-01-01 RX ADMIN — SODIUM CHLORIDE 1000 MILLILITER(S): 9 INJECTION, SOLUTION INTRAVENOUS at 04:44

## 2022-01-01 RX ADMIN — BUDESONIDE AND FORMOTEROL FUMARATE DIHYDRATE 2 PUFF(S): 160; 4.5 AEROSOL RESPIRATORY (INHALATION) at 22:52

## 2022-01-01 RX ADMIN — Medication 100 MILLIEQUIVALENT(S): at 10:45

## 2022-01-01 RX ADMIN — LOSARTAN POTASSIUM 100 MILLIGRAM(S): 100 TABLET, FILM COATED ORAL at 17:28

## 2022-01-01 RX ADMIN — Medication 100 MILLIEQUIVALENT(S): at 20:17

## 2022-01-01 RX ADMIN — PANTOPRAZOLE SODIUM 40 MILLIGRAM(S): 20 TABLET, DELAYED RELEASE ORAL at 13:00

## 2022-01-01 RX ADMIN — Medication 6: at 18:28

## 2022-01-01 RX ADMIN — Medication 100 MILLIEQUIVALENT(S): at 12:09

## 2022-01-01 RX ADMIN — BUDESONIDE AND FORMOTEROL FUMARATE DIHYDRATE 2 PUFF(S): 160; 4.5 AEROSOL RESPIRATORY (INHALATION) at 08:47

## 2022-01-01 RX ADMIN — Medication 100 MILLIEQUIVALENT(S): at 08:05

## 2022-01-01 RX ADMIN — Medication 100 MILLIGRAM(S): at 05:17

## 2022-01-01 RX ADMIN — SODIUM CHLORIDE 75 MILLILITER(S): 9 INJECTION, SOLUTION INTRAVENOUS at 18:32

## 2022-01-01 RX ADMIN — HEPARIN SODIUM 5000 UNIT(S): 5000 INJECTION INTRAVENOUS; SUBCUTANEOUS at 21:15

## 2022-01-01 RX ADMIN — HEPARIN SODIUM 5000 UNIT(S): 5000 INJECTION INTRAVENOUS; SUBCUTANEOUS at 21:47

## 2022-01-01 RX ADMIN — ATORVASTATIN CALCIUM 10 MILLIGRAM(S): 80 TABLET, FILM COATED ORAL at 22:06

## 2022-01-01 RX ADMIN — SODIUM CHLORIDE 65 MILLILITER(S): 9 INJECTION, SOLUTION INTRAVENOUS at 12:58

## 2022-01-01 RX ADMIN — ENOXAPARIN SODIUM 40 MILLIGRAM(S): 100 INJECTION SUBCUTANEOUS at 18:16

## 2022-01-01 RX ADMIN — Medication 100 MILLIGRAM(S): at 14:40

## 2022-01-01 RX ADMIN — Medication 3.75 MICROGRAM(S)/KG/MIN: at 15:23

## 2022-01-01 RX ADMIN — POTASSIUM PHOSPHATE, MONOBASIC POTASSIUM PHOSPHATE, DIBASIC 83.33 MILLIMOLE(S): 236; 224 INJECTION, SOLUTION INTRAVENOUS at 14:27

## 2022-01-01 RX ADMIN — HEPARIN SODIUM 5000 UNIT(S): 5000 INJECTION INTRAVENOUS; SUBCUTANEOUS at 13:21

## 2022-01-01 RX ADMIN — Medication 100 GRAM(S): at 21:42

## 2022-01-01 RX ADMIN — HEPARIN SODIUM 5000 UNIT(S): 5000 INJECTION INTRAVENOUS; SUBCUTANEOUS at 14:26

## 2022-01-01 RX ADMIN — ENOXAPARIN SODIUM 40 MILLIGRAM(S): 100 INJECTION SUBCUTANEOUS at 07:59

## 2022-01-01 RX ADMIN — Medication 100 MILLIEQUIVALENT(S): at 08:52

## 2022-01-01 RX ADMIN — ENOXAPARIN SODIUM 40 MILLIGRAM(S): 100 INJECTION SUBCUTANEOUS at 07:07

## 2022-01-01 RX ADMIN — Medication 0.05 MICROGRAM(S)/KG/MIN: at 19:40

## 2022-01-01 RX ADMIN — MIDODRINE HYDROCHLORIDE 10 MILLIGRAM(S): 2.5 TABLET ORAL at 17:59

## 2022-01-01 RX ADMIN — Medication 100 MILLIEQUIVALENT(S): at 09:50

## 2022-01-01 RX ADMIN — Medication 2: at 06:13

## 2022-01-01 RX ADMIN — MUPIROCIN 1 APPLICATION(S): 20 OINTMENT TOPICAL at 07:02

## 2022-01-01 RX ADMIN — Medication 6 MILLIGRAM(S): at 05:10

## 2022-01-01 RX ADMIN — Medication 100 MILLIGRAM(S): at 21:15

## 2022-01-01 RX ADMIN — Medication 40 MILLIEQUIVALENT(S): at 21:47

## 2022-01-01 RX ADMIN — Medication 6 MILLIGRAM(S): at 05:15

## 2022-01-01 RX ADMIN — HEPARIN SODIUM 5000 UNIT(S): 5000 INJECTION INTRAVENOUS; SUBCUTANEOUS at 21:30

## 2022-01-01 RX ADMIN — LOSARTAN POTASSIUM 100 MILLIGRAM(S): 100 TABLET, FILM COATED ORAL at 05:07

## 2022-01-01 RX ADMIN — SODIUM CHLORIDE 1000 MILLILITER(S): 9 INJECTION INTRAMUSCULAR; INTRAVENOUS; SUBCUTANEOUS at 18:09

## 2022-01-01 RX ADMIN — Medication 100 MILLIEQUIVALENT(S): at 11:04

## 2022-01-01 RX ADMIN — REMDESIVIR 500 MILLIGRAM(S): 5 INJECTION INTRAVENOUS at 18:31

## 2022-01-01 RX ADMIN — Medication 100 MILLIEQUIVALENT(S): at 21:46

## 2022-01-01 RX ADMIN — POTASSIUM PHOSPHATE, MONOBASIC POTASSIUM PHOSPHATE, DIBASIC 62.5 MILLIMOLE(S): 236; 224 INJECTION, SOLUTION INTRAVENOUS at 10:59

## 2022-01-01 RX ADMIN — CEFEPIME 100 MILLIGRAM(S): 1 INJECTION, POWDER, FOR SOLUTION INTRAMUSCULAR; INTRAVENOUS at 01:37

## 2022-01-01 RX ADMIN — SODIUM CHLORIDE 75 MILLILITER(S): 9 INJECTION, SOLUTION INTRAVENOUS at 06:52

## 2022-01-01 RX ADMIN — REMDESIVIR 500 MILLIGRAM(S): 5 INJECTION INTRAVENOUS at 18:33

## 2022-01-01 RX ADMIN — Medication 100 MILLIGRAM(S): at 14:49

## 2022-01-01 RX ADMIN — Medication 100 MILLIEQUIVALENT(S): at 18:50

## 2022-01-01 RX ADMIN — SODIUM CHLORIDE 50 MILLILITER(S): 9 INJECTION, SOLUTION INTRAVENOUS at 21:53

## 2022-01-01 RX ADMIN — CEFEPIME 100 MILLIGRAM(S): 1 INJECTION, POWDER, FOR SOLUTION INTRAMUSCULAR; INTRAVENOUS at 17:00

## 2022-01-01 RX ADMIN — BUDESONIDE AND FORMOTEROL FUMARATE DIHYDRATE 2 PUFF(S): 160; 4.5 AEROSOL RESPIRATORY (INHALATION) at 21:14

## 2022-01-01 RX ADMIN — ENOXAPARIN SODIUM 40 MILLIGRAM(S): 100 INJECTION SUBCUTANEOUS at 17:31

## 2022-01-01 RX ADMIN — FAMOTIDINE 20 MILLIGRAM(S): 10 INJECTION INTRAVENOUS at 19:46

## 2022-01-01 RX ADMIN — SODIUM CHLORIDE 50 MILLILITER(S): 9 INJECTION, SOLUTION INTRAVENOUS at 10:17

## 2022-01-01 RX ADMIN — Medication 400 MILLIGRAM(S): at 13:45

## 2022-01-01 RX ADMIN — CEFEPIME 100 MILLIGRAM(S): 1 INJECTION, POWDER, FOR SOLUTION INTRAMUSCULAR; INTRAVENOUS at 01:49

## 2022-01-01 RX ADMIN — SODIUM CHLORIDE 65 MILLILITER(S): 9 INJECTION, SOLUTION INTRAVENOUS at 08:59

## 2022-01-01 RX ADMIN — Medication 100 MILLIGRAM(S): at 21:29

## 2022-01-01 RX ADMIN — Medication 100 MILLIEQUIVALENT(S): at 19:35

## 2022-01-01 RX ADMIN — AZITHROMYCIN 500 MILLIGRAM(S): 500 TABLET, FILM COATED ORAL at 06:49

## 2022-01-01 RX ADMIN — Medication 100 MILLIEQUIVALENT(S): at 15:12

## 2022-01-01 RX ADMIN — HEPARIN SODIUM 5000 UNIT(S): 5000 INJECTION INTRAVENOUS; SUBCUTANEOUS at 14:41

## 2022-01-01 RX ADMIN — HEPARIN SODIUM 5000 UNIT(S): 5000 INJECTION INTRAVENOUS; SUBCUTANEOUS at 05:37

## 2022-01-01 RX ADMIN — HEPARIN SODIUM 5000 UNIT(S): 5000 INJECTION INTRAVENOUS; SUBCUTANEOUS at 16:51

## 2022-01-01 RX ADMIN — Medication 100 MILLIEQUIVALENT(S): at 11:41

## 2022-01-01 RX ADMIN — BUDESONIDE AND FORMOTEROL FUMARATE DIHYDRATE 2 PUFF(S): 160; 4.5 AEROSOL RESPIRATORY (INHALATION) at 22:30

## 2022-01-01 RX ADMIN — ALBUTEROL 2 PUFF(S): 90 AEROSOL, METERED ORAL at 10:32

## 2022-01-01 RX ADMIN — REMDESIVIR 500 MILLIGRAM(S): 5 INJECTION INTRAVENOUS at 17:23

## 2022-11-22 NOTE — ED PROVIDER NOTE - NS ED ROS FT
GENERAL: No fever or chills, EYES: no change in vision, HEENT: no trouble swallowing or speaking, CARDIAC: no chest pain, PULMONARY: no cough or SOB, GI: no abdominal pain, no nausea, no vomiting, no diarrhea or constipation, : No changes in urination, SKIN: no rashes, NEURO: no headache,  MSK: No joint pain     All other ROS negative unless otherwise specified in HPI.     ~Gloria Hubbard M.D. Resident

## 2022-11-22 NOTE — ED ADULT TRIAGE NOTE - CHIEF COMPLAINT QUOTE
Pt presents to ED via wheelchair from MD office with c/o hoarse voice and weight loss x 4 weeks. Pt reports 20lb weight loss over 3-4 weeks. Pt reports decreased appetite and PO intake.

## 2022-11-22 NOTE — ED PROVIDER NOTE - ATTENDING CONTRIBUTION TO CARE
81F HTN HLD p/w 3 weeks of dysphagia to solids and liquids, 20lbs weight loss, voice changes, feels hoarse, quit smoking 4.5 yr ago.  Went to , found low BP and sent here. Also gen weakness. EKG SR at 94 no adonis no std (+) twi v2v3  qtc 470.  Bifascicular block.  Tachycardic to 106.  On exam appears dry.  Abd nontender.  Voice sounds hoarse.  Normal strength.  No LAD.  Pt reports L carotid artery surgery in the past, also thyroid issue.   Dysphagia eval.  Plan check CT chest and neck with IV contrast, Rx fluids, admit for further workup.    VS:  unremarkable except tachycardia     GEN - NAD;   malaise, cachexia.  A+O x3   HEAD - NC/AT     ENT - PEERL, EOMI, mucous membranes    moist , no discharge Voice hoarse, airway patent, managing secretions, no trismus.  No laryngeal ttp or mass appreciated.  Pharynx dry but no tonsillar swelling or redness.   NECK: Neck supple, non-tender without lymphadenopathy, no masses, no JVD  PULM - CTA b/l,  symmetric breath sounds  COR -  normal heart sounds    ABD - , ND, NT, soft,  BACK - no CVA tenderness, nontender spine     EXTREMS - no edema, no deformity, warm and well perfused    SKIN - no rash    or bruising      NEUROLOGIC - alert, face symmetric, speech fluent, sensation nl, motor no focal deficit.

## 2022-11-22 NOTE — H&P ADULT - HISTORY OF PRESENT ILLNESS
81yoF w/Hx of HTN and HLD p/w generalized weakness. Pt states that for the past 6 weeks she has been having difficulty swallowing solids and liquids. This has caused her to have a 20lbs weight loss during that time. She also states that she has been having light intolerance and watery eyes for the past few weeks. Patient has a 50 year pack history of smoking quit 4 years ago. Denies HA, SOB, CP, palpitations, abd pain, n/v/d/c, fever/chills, dysuria/hematuria, hematochezia/melena, numbness/tingling, focal weakness, trauma/falls. No Hx of this in the past. Per pt daughter in law, she was told that she had thyroid issues multiple years ago, and was found to have a multinodular thyroid on imaging 3 years ago. Pt did not follow up or start any new medications.

## 2022-11-22 NOTE — ED ADULT NURSE NOTE - OBJECTIVE STATEMENT
Pt presents to ED brought in by EMS for poor PO intake due to throat pain. Pt states she has not eaten in 5 weeks because she is afraid she will throw up when she eats. States she has been able to drink some water recently with more ease. Family states that she has lost about 20 pounds in the last month and she was complaining of throat pain and difficulty swallowing. Currently, no dysphagia or dysarthria noted, no drooling noted, no respiratory distress noted. 20G Ruslan placed to RAC. Flushed well with 10cc NS with good blood return. No s/s discomfort or erythema at insertion site. Labs obtained. medicated as ordered. Family at bedside. call bell within reach. Education provided to pt on how to and when to use call bell for assistance. Will continue to monitor. VENKAT Escobar

## 2022-11-22 NOTE — ED PROVIDER NOTE - OBJECTIVE STATEMENT
81yoF w/Hx of HTN, HLD, noncompliant w/meds p/w generalized weakness. Pt has had 3 weeks of increasing difficulty swallowing solids and liquids associated w/20 lb weight loss over the last 5 weeks, and new hoarseness over the last few weeks as well. Former smoker quit 4.5 years ago. Denies HA, SOB, CP, palpitations, abd pain, n/v/d/c, fever/chills, dysuria/hematuria, hematochezia/melena, numbness/tingling, focal weakness, trauma/falls. No Hx of this in the past.

## 2022-11-22 NOTE — H&P ADULT - ATTENDING COMMENTS
81yoF w/Hx of HTN and HLD p/w generalized weakness and dysphagia 2/2 poor po intake related to enlarging Multinodular goiter found to be in likely subclinical hyperthyroidism. Pt reports prior diagnosis of multinodular goiter but lost to follow up. In the last 6 weeks, developed worsening dysphagia symptoms. Currently unable to tolerate liquids. She denies difficulty breathing or trouble swallowing secretions. On CT found to have Multinodular goiter with retrosternal involvement causing rightward deviation of the trachea, without significant airway narrowing. The esophagus appears within normal limits. Appreciate ENT eval, no airway compromise. C/w humidified face tent. Endocrinology c/s in AM. TSH low, normal T4, low normal T3 most consistent with subclinical hyperthyroidism. Likely 2/2 multinodular goiter vs graves. 81yoF w/Hx of HTN and HLD p/w generalized weakness and dysphagia 2/2 poor po intake related to enlarging Multinodular goiter found to be in likely subclinical hyperthyroidism. Pt reports prior diagnosis of multinodular goiter but lost to follow up. In the last 6 weeks, developed worsening dysphagia symptoms. Currently unable to tolerate liquids. She denies difficulty breathing or trouble swallowing secretions. On CT found to have Multinodular goiter with retrosternal involvement causing rightward deviation of the trachea, without significant airway narrowing. The esophagus appears within normal limits. Appreciate ENT eval, no airway compromise. C/w humidified face tent. Endocrinology c/s in AM. TSH low, normal T4, low normal T3 most consistent with subclinical hyperthyroidism. Likely 2/2 multinodular goiter. F/u US, may need radioiodine uptake and scan, f/u endo. F/u thyroid ab. FNA as outpt. Keep NPO for now, speech and swallow eval pending. IVF while npo. Rest of care as above.

## 2022-11-22 NOTE — ED PROVIDER NOTE - CLINICAL SUMMARY MEDICAL DECISION MAKING FREE TEXT BOX
81yoF w/Hx HTN, HLD p/w 3w worsening dysphagia and hoarseness, unable to tolerate PO, associated w/20 lb weight loss, no night sweats, no sick contacts. VS unremarkable, phys exam WNL. DDx metabolic/electrolyte abnormality vs. pulm malignancy given weight loss vs. URI, will eval w/labs, CT chest/neck, reassess. Dispo pending workup, likely TBA for further evaluation. - Gloria Hubbard, PGY-2

## 2022-11-22 NOTE — H&P ADULT - PROBLEM SELECTOR PLAN 7
DVT ppx: Lovenox DVT ppx: Lovenox  An indeterminant right adrenal nodule measures approximately 2 x 1.6 x 1.4 cm. Outpt f/u upon d/c

## 2022-11-22 NOTE — H&P ADULT - NSHPSOCIALHISTORY_GEN_ALL_CORE
Tobacco: 50 pack year history (quit 4 years ago)   Alcohol: no  Illicit drugs: no  Pt lives with son and daughter in law

## 2022-11-22 NOTE — H&P ADULT - PROBLEM SELECTOR PLAN 2
-CT Chest/Neck showed Multinodular goiter causing rightward deviation of the trachea, without significant airway narrowing. The esophagus appears within normal limits  -Will keep pt on pureed diet for now and official S&S eval in the morning  -Continue to monitor BMP and replete electrolyte and needed -CT Chest/Neck showed Multinodular goiter causing rightward deviation of the trachea, without significant airway narrowing. The esophagus appears within normal limits  -Will consult ENT for concern for compression   -Will keep pt NPO and official S&S eval in the morning  -Continue to monitor BMP and replete electrolyte and needed

## 2022-11-22 NOTE — H&P ADULT - NSHPREVIEWOFSYSTEMS_GEN_ALL_CORE
REVIEW OF SYSTEMS:    CONSTITUTIONAL: + weakness. No fevers or chills  EYES:  + light sensitivity and watery eyes    ENT: No vertigo or throat pain. + dysphagia   NECK: No pain or stiffness  RESPIRATORY: No cough, wheezing, hemoptysis; No shortness of breath  CARDIOVASCULAR: No chest pain or palpitations  GASTROINTESTINAL: No abdominal or epigastric pain. No nausea, vomiting, or hematemesis; + diarrhea. No melena or hematochezia.  GENITOURINARY: No dysuria, frequency or hematuria  NEUROLOGICAL: No numbness or weakness  SKIN: No itching, rashes  Psych: no anxiety or depression REVIEW OF SYSTEMS:    CONSTITUTIONAL: + weakness. +weight loss No fevers or chills  EYES:  + light sensitivity and watery eyes    ENT: No vertigo or throat pain. + dysphagia   NECK: No pain or stiffness  RESPIRATORY: No cough, wheezing, hemoptysis; No shortness of breath  CARDIOVASCULAR: No chest pain or palpitations  GASTROINTESTINAL: No abdominal or epigastric pain. No nausea, vomiting, or hematemesis; + diarrhea. No melena or hematochezia.  GENITOURINARY: No dysuria, frequency or hematuria  NEUROLOGICAL: No numbness or weakness  SKIN: No itching, rashes  Psych: +anxiety

## 2022-11-22 NOTE — H&P ADULT - ASSESSMENT
81yoF w/Hx of HTN and HLD p/w generalized weakness. 81yoF w/Hx of HTN and HLD p/w generalized weakness likely 2/2 poor po intake related to enlarging Multinodular goiter found to be in likely subclinical hyperthyroidism

## 2022-11-22 NOTE — H&P ADULT - PROBLEM SELECTOR PLAN 4
-Pt unsure of home medication, will do official med rec in the AM Pt with Na of 151 at admission  -Likely 2/2 hypovolemia from decr PO intake  -Will start patient on mIVF and recheck labs in the AM

## 2022-11-22 NOTE — ED PROVIDER NOTE - PHYSICAL EXAMINATION
Gen: AAOx3, non-toxic elderly woman lying in bed in NAD  Head: NCAT  HEENT: EOMI, PERRL, oral mucosa dry, normal conjunctiva  Lung: CTAB, no respiratory distress, no wheezes/rhonchi/rales B/L, speaking in full sentences  CV: RRR, no murmurs, rubs or gallops  Abd: soft, NTND, no guarding, no CVA tenderness  MSK: no visible deformities  Neuro: No focal sensory or motor deficits  Skin: Warm, well perfused, no rash, no edema  Psych: pleasant, normal affect.   ~Gloria Hubbard M.D. Resident

## 2022-11-22 NOTE — H&P ADULT - NSHPPHYSICALEXAM_GEN_ALL_CORE
LOS: 1d    VITALS:   T(C): 36.6 (11-22-22 @ 23:35), Max: 36.6 (11-22-22 @ 23:35)  HR: 67 (11-22-22 @ 23:35) (67 - 108)  BP: 162/57 (11-22-22 @ 23:35) (115/75 - 162/57)  RR: 18 (11-22-22 @ 23:35) (18 - 18)  SpO2: 100% (11-22-22 @ 23:35) (96% - 100%)    GENERAL: NAD, lying in bed comfortably  HEAD:  Atraumatic, Normocephalic  EYES: EOMI, PERRLA, conjunctiva and sclera clear but watery   ENT: Moist mucous membranes  NECK: Supple, No JVD  CHEST/LUNG: Clear to auscultation bilaterally; No rales, rhonchi, wheezing, or rubs. Unlabored respirations  HEART: Regular rate and rhythm; No murmurs, rubs, or gallops  ABDOMEN: BSx4; Soft, nontender, nondistended  EXTREMITIES:  2+ Peripheral Pulses, brisk capillary refill. No clubbing, cyanosis, or edema  NERVOUS SYSTEM:  A&Ox3, no focal deficits   SKIN: No rashes or lesions  Psych: Normal mood and affect

## 2022-11-22 NOTE — H&P ADULT - PROBLEM SELECTOR PLAN 3
-Pt symptomatic with labs concerning for subclinical hyperthyroidism  -Likely 2/2 multinodular goiter on imaging   -Will start patient on methimazole 5mg since symptomatic   -Will consult Endo in the AM -Pt symptomatic with labs concerning for subclinical hyperthyroidism  -Likely 2/2 multinodular goiter on imaging   -Will start patient on methimazole 5mg since symptomatic   -Will order radioactive iodine uptake  -Thyroid ab test pending   -Will consult Endo in the AM -Pt with labs concerning for subclinical hyperthyroidism  -Likely 2/2 multinodular goiter on imaging   -Thyroid ab test pending   -Will consult Endo in the AM, pt will likely need radioactive iodine uptake

## 2022-11-22 NOTE — H&P ADULT - PROBLEM SELECTOR PLAN 1
-Pt having weakness due to dysphagia and decr PO intake for 6 weeks. 20lbs weight loss for the past 6 weeks  -Labs significant for TSH <0.10, normal T4 of 6.38, and slight low T3 of 71. electrolytes wnl, RVP negative   -CT Chest/Neck showed Multinodular goiter causing rightward deviation of the trachea, without significant airway narrowing. The esophagus appears within normal limits  -Weakness likely 2/2 subclinical hyperthyroidism, plan as below  -Slight elevation in WBC likely reactive -Pt having weakness due to dysphagia and decr PO intake for 6 weeks. 20lbs weight loss for the past 6 weeks  -Labs significant for TSH <0.10, normal T4 of 6.38, and slight low T3 of 71. electrolytes wnl, RVP negative   -CT Chest/Neck showed Multinodular goiter causing rightward deviation of the trachea, without significant airway narrowing. The esophagus appears within normal limits  -Weakness likely 2/2 decr po intake vs subclinical hyperthyroidism  -Slight elevation in WBC likely reactive from hypovolemia

## 2022-11-22 NOTE — H&P ADULT - NSHPLABSRESULTS_GEN_ALL_CORE
.  LABS:                         16.0   12.75 )-----------( 341      ( 22 Nov 2022 17:15 )             51.4     11-22    151<H>  |  104  |  36<H>  ----------------------------<  113<H>  3.7   |  28  |  1.02    Ca    10.2      22 Nov 2022 17:15  Phos  3.5     11-22  Mg     2.40     11-22    TPro  6.9  /  Alb  3.5  /  TBili  0.4  /  DBili  x   /  AST  21  /  ALT  10  /  AlkPhos  99  11-22    PT/INR - ( 22 Nov 2022 17:15 )   PT: 15.8 sec;   INR: 1.36 ratio         PTT - ( 22 Nov 2022 17:15 )  PTT:30.3 sec    Serum Pro-Brain Natriuretic Peptide: 607 pg/mL (11-22 @ 17:15)        RADIOLOGY, EKG & ADDITIONAL TESTS: Reviewed. .  LABS:                         16.0   12.75 )-----------( 341      ( 22 Nov 2022 17:15 )             51.4     11-22    151<H>  |  104  |  36<H>  ----------------------------<  113<H>  3.7   |  28  |  1.02    Ca    10.2      22 Nov 2022 17:15  Phos  3.5     11-22  Mg     2.40     11-22    TPro  6.9  /  Alb  3.5  /  TBili  0.4  /  DBili  x   /  AST  21  /  ALT  10  /  AlkPhos  99  11-22    PT/INR - ( 22 Nov 2022 17:15 )   PT: 15.8 sec;   INR: 1.36 ratio         PTT - ( 22 Nov 2022 17:15 )  PTT:30.3 sec    Serum Pro-Brain Natriuretic Peptide: 607 pg/mL (11-22 @ 17:15)        RADIOLOGY, EKG & ADDITIONAL TESTS: Reviewed.    EKG: NSR, RBBB with t wave inversions, no acute ST changes

## 2022-11-23 NOTE — DIETITIAN INITIAL EVALUATION ADULT - PERTINENT MEDS FT
MEDICATIONS  (STANDING):  enoxaparin Injectable 40 milliGRAM(s) SubCutaneous every 24 hours  lactated ringers. 1000 milliLiter(s) (75 mL/Hr) IV Continuous <Continuous>    MEDICATIONS  (PRN):
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
## 2022-11-23 NOTE — SWALLOW BEDSIDE ASSESSMENT ADULT - COMMENTS
Per Internal Medicine Note 10/23/22: "80 yo F w/Hx of HTN and HLD p/w generalized weakness likely 2/2 poor po intake related to enlarging Multinodular goiter found to be in likely subclinical hyperthyroidism"     Per ENT Note 11/23/23: "A/P: 82yo F PMH HTN, HLD, previous smoker p/w dysphagia, weakness x6 weeks found to have substernal multinodular thyroid goiter on CT:"    PAtient received upright in bed AAOx4. Patient denies globus sensation and odynophagia though reports needing to "push food down" to swallow and fear of swallowing, choking and vomiting  post PO intake. Patient further reported recent weight loss of 20 pounds.

## 2022-11-23 NOTE — SWALLOW BEDSIDE ASSESSMENT ADULT - SWALLOW EVAL: DIAGNOSIS
Patient presents with oropharyngeal dysphagia given thin liquids and puree marked by adequate bolus containment, slowed bolus manipulation, slowed anterior-posterior transport with adequate oral clearance post primary swallow. Pharyngeal stage marked by observed initiation of the pharyngeal swallow trigger judged via laryngeal palpation. No overt s/sx of impaired airway protection observed for all trials. Solids not trialed as patient declined due to reported fear.

## 2022-11-23 NOTE — SWALLOW BEDSIDE ASSESSMENT ADULT - SWALLOW EVAL: RECOMMENDED DIET
1. Puree with Thin Liquids. 2. Consider non-oral means of nutrition as patient's ability to meet PO intake may be guarded placing patient at risk for malnutrition/dehydration/failure to thrive

## 2022-11-23 NOTE — SWALLOW BEDSIDE ASSESSMENT ADULT - ADDITIONAL RECOMMENDATIONS
1. This department to follow up as schedule permits for diet tolerance/advanvement. 2. Medical team advised to reconsult this department if there is a change in medical status/ability to tolerate PO diet.

## 2022-11-23 NOTE — CONSULT NOTE ADULT - SUBJECTIVE AND OBJECTIVE BOX
80yo PMH HTN, HLD p/w dysphagia and weakness x6 weeks associated with weight loss. ENT consulted for dysphagia. Patient reports difficulty swallowing solids and liquids. Denies any respiratory distress, voice issues. No prior neck surgery.     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   Hx of smoking cigarettes: 50 pack years, quit 4y ago    Vital Signs Last 24 Hrs  T(C): 36.6 (22 Nov 2022 23:35), Max: 36.6 (22 Nov 2022 23:35)  T(F): 97.8 (22 Nov 2022 23:35), Max: 97.8 (22 Nov 2022 23:35)  HR: 67 (22 Nov 2022 23:35) (67 - 108)  BP: 162/57 (22 Nov 2022 23:35) (115/75 - 162/57)  BP(mean): --  RR: 18 (22 Nov 2022 23:35) (18 - 18)  SpO2: 100% (22 Nov 2022 23:35) (96% - 100%)    Parameters below as of 22 Nov 2022 23:35  Patient On (Oxygen Delivery Method): room air    PHYSICAL EXAM:    CONSTITUTIONAL: well nourished, well developed, NON-DYSMORPHIC, and in no acute distress.      EYES: pupils equal and round and no abnormalities of the conjunctivae and lids.     RESPIRATORY: respirations unlabored, no increased work of breathing with use of accessory muscles and retractions. NO STRIDOR.    CARDIAC: warm extremities, no cyanosis.  ABDOMEN: nondistended.  THORAX: no gross deformities, no pectus defects.   NEUROLOGIC: cranial nerves II - VII and IX - XII with no gross deficits.   MUSCULOSKELETAL: normal muscle STRENGTH, symmetry and tone of facial, head and neck musculature, no clubbing.   INTEGUMENTARY: no obvious skin rash, no skin lesions.  LYMPHATIC: no cervical lymphadenopathy.   PSYCHIATRIC: age APPROPRIATE behavior.   HEAD: normocephalic, atraumatic.    RIGHT EAR: The right pinna was normal. The right external auditory canal was normal and the right TYMPANIC MEMBRANE was intact and well aerated.     LEFT EAR: The left pinna was normal. The left external auditory canal was normal and the left TYMPANIC MEMBRANE was intact and well aerated.     NOSE: External Nose: normal  Anterior Nasal Cavity: the right nasal cavity was normal and the left nasal cavity was normal.    ORAL CAVITY/OROPHARYNX: normal mucosa    Right TONSIL Size:1+ Left TONSIL Size: 1+    NECK: normal with no obvious cervical lesions    LARYNGOSCOPY EXAM:     -Verbal consent was obtained from patient prior to procedure.    Indication:    Anesthesia: Afrin spray was applied to the nasal cavities.    Flexible laryngoscopy was performed and revealed the following:    -- Nasopharynx had no mass or exudate.    -- Base of tongue was symmetric and not enlarged.    -- Vallecula was clear    -- Epiglottis, both aryepiglottic folds and both false vocal folds were normal    -- Arytenoids both without edema and erythema     -- True vocal folds were fully mobile and without lesions.     -- Post cricoid area was clear.    -- Interarytenoid edema was absent    The patient tolerated the procedure well.               16.0   12.75 )-----------( 341      ( 22 Nov 2022 17:15 )             51.4   11-22    151<H>  |  104  |  36<H>  ----------------------------<  113<H>  3.7   |  28  |  1.02    Ca    10.2      22 Nov 2022 17:15  Phos  3.5     11-22  Mg     2.40     11-22    TPro  6.9  /  Alb  3.5  /  TBili  0.4  /  DBili  x   /  AST  21  /  ALT  10  /  AlkPhos  99  11-22    TSH <0.10 (L)  FT4 0.9    A/P: 80yo F PMH HTN, HLD, previous smoker p/w dysphagia, weakness x6 weeks found to have substernal multinodular thyroid goiter on CT.   - consult SLP for swallow   - consult endocrinology   - US thyroid   - outpatient FNA of thyroid nodules  - follow up outpatient ENT clinic ()    82yo PMH HTN, HLD p/w dysphagia and weakness x6 weeks associated with weight loss. ENT consulted for dysphagia. Patient reports difficulty swallowing solids and liquids. Denies choking or coughing with swallowing. However feels that she has to push down her food more than usual. Does not recall any triggers for dysphagia. Denies globus sensation, throat pain. Denies any respiratory distress, voice issues. No prior neck surgery. She notes that she has not been able to eat properly due to uncomfortable dentures. She has not been able to eat properly without her dentures.     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   Hx of smoking cigarettes: 50 pack years, quit 4y ago    Vital Signs Last 24 Hrs  T(C): 36.6 (22 Nov 2022 23:35), Max: 36.6 (22 Nov 2022 23:35)  T(F): 97.8 (22 Nov 2022 23:35), Max: 97.8 (22 Nov 2022 23:35)  HR: 67 (22 Nov 2022 23:35) (67 - 108)  BP: 162/57 (22 Nov 2022 23:35) (115/75 - 162/57)  BP(mean): --  RR: 18 (22 Nov 2022 23:35) (18 - 18)  SpO2: 100% (22 Nov 2022 23:35) (96% - 100%)    Parameters below as of 22 Nov 2022 23:35  Patient On (Oxygen Delivery Method): room air    PHYSICAL EXAM:    CONSTITUTIONAL: frail, skinny, no acute distress.      EYES: pupils equal and round and no abnormalities of the conjunctivae and lids.     RESPIRATORY: respirations unlabored, no increased work of breathing with use of accessory muscles and retractions. NO STRIDOR.    CARDIAC: warm extremities, no cyanosis.  INTEGUMENTARY: no obvious skin rash, no skin lesions.  LYMPHATIC: no cervical lymphadenopathy.   PSYCHIATRIC: age APPROPRIATE behavior.   HEAD: normocephalic, atraumatic.  RIGHT EAR: The right pinna was normal.   LEFT EAR: The left pinna was normal.   NOSE: External Nose: normal  Anterior Nasal Cavity: the right nasal cavity was normal and the left nasal cavity was normal. Dry nasal mucosa   ORAL CAVITY/OROPHARYNX: very dry tongue, dry oral mucosa, OP mild erythema and no edema. Inspissated mucus      Right TONSIL Size:1+ Left TONSIL Size: 1+    NECK: normal with no obvious cervical lesions. L thyroid goiter. No LAD     LARYNGOSCOPY EXAM:     -Verbal consent was obtained from patient prior to procedure.    Indication:    Anesthesia: Afrin spray was applied to the nasal cavities.    Flexible laryngoscopy was performed and revealed the following:    -- Nasopharynx had no mass or exudate.    -- Base of tongue was symmetric and not enlarged. Mucosa very dry. Inspissated mucus     -- Vallecula was clear    -- Epiglottis, both aryepiglottic folds and both false vocal folds were normal    -- Arytenoids both without edema and erythema. Mucosa very dry. Inspissated mucus     -- True vocal folds were fully mobile and without lesions. Presbylarynx, mild glottic gap     -- Post cricoid area was clear.    -- Interarytenoid edema was absent    The patient tolerated the procedure well.               16.0   12.75 )-----------( 341      ( 22 Nov 2022 17:15 )             51.4   11-22    151<H>  |  104  |  36<H>  ----------------------------<  113<H>  3.7   |  28  |  1.02    Ca    10.2      22 Nov 2022 17:15  Phos  3.5     11-22  Mg     2.40     11-22    TPro  6.9  /  Alb  3.5  /  TBili  0.4  /  DBili  x   /  AST  21  /  ALT  10  /  AlkPhos  99  11-22    TSH <0.10 (L)  FT4 0.9    A/P: 82yo F PMH HTN, HLD, previous smoker p/w dysphagia, weakness x6 weeks found to have substernal multinodular thyroid goiter on CT.   - PO/IVF hydration  - humidified face tent   - consult SLP for swallow   - consult endocrinology   - US thyroid   - outpatient FNA of thyroid nodules  - follow up outpatient ENT clinic ()

## 2022-11-23 NOTE — DIETITIAN INITIAL EVALUATION ADULT - ADD RECOMMEND
1) Monitor weights, PO intake/diet tolerance, skin integrity, pertinent labs.   2) Suggest goals of care discussion; consider alternate means of nutrition/hydration in accordance with goals of care / patient wishes if PO intake remains suboptimal.    3) Encourage and assist patient during mealtimes, honor food preferences as requested and available.

## 2022-11-23 NOTE — DIETITIAN INITIAL EVALUATION ADULT - PERTINENT LABORATORY DATA
11-22    151<H>  |  104  |  36<H>  ----------------------------<  113<H>  3.7   |  28  |  1.02    Ca    10.2      22 Nov 2022 17:15  Phos  3.5     11-22  Mg     2.40     11-22    TPro  6.9  /  Alb  3.5  /  TBili  0.4  /  DBili  x   /  AST  21  /  ALT  10  /  AlkPhos  99  11-22

## 2022-11-23 NOTE — SWALLOW BEDSIDE ASSESSMENT ADULT - ASR SWALLOW RECOMMEND DIAG
Cinesophagram at MD's discretion given patient reported fear of swallowing and patient indicating "needing to push food down" when swallowing.

## 2022-11-23 NOTE — PROGRESS NOTE ADULT - NSPROGADDITIONALINFOA_GEN_ALL_CORE
Pt of PCP Dr. Larson at [facility].    Pending Speech and swallow eval.  pending House Endo eval.    If she can tolerates oral diet, and if Endo not planning any inpatient work up, then tentative dc planning with outpt ENT and Endo close follow up.  d/w pt and NP.     - Dr. MARISELA Benavidez ([facility])  - [phone]

## 2022-11-23 NOTE — CONSULT NOTE ADULT - ASSESSMENT
#Abnormal TFTs, 2/2 subclinical hyperthyroidism?, rule out central hypothyroidism?   #Thyroid MNG Goiter   Thyroid Stimulating Hormone, Serum: <0.10 uIU/mL (11.22.22 @ 17:15) (low)   Free Thyroxine, Serum: 0.9 ng/dL (11.22.22 @ 20:04) (low normal range)   Triiodothyronine, Total (T3 Total): 71 ng/dL (11.22.22 @ 17:15) (low)  HyperNatremic to 151   weakness, 20lbs weight loss, some visual symptoms/tearing per HPI   Has a goiter with dysphagia as well   recent steroids? although would not cause undetectable TSH   Recommendations:     Vane Reyes, DO   Endocrine Fellow  For follow up questions, discharge recommendations, or new consults please call answering service at [phone] (weekdays), [phone] (nights/weekends). For nonurgent matters, please email [email or web address] or [email or web address]      82 yo F w/Hx of HTN and HLD p/w dysphagia and generalized weakness iso weight loss and poor po intake suspected to be related to thyroid MNG. Patient also noted to have abnormal tfts and a R. adrenal nodule. Endocrine consulted for assistance with further evaluation & management of abnormal TFTs, MNG, and adrenal incidentaloma.     #Abnormal TFTs, 2/2 subclinical hyperthyroidism 2/2 toxic nodule, graves vs central hypothyroidism?   #Thyroid MNG Goiter with tracheal deviation  Thyroid Stimulating Hormone, Serum: <0.10 uIU/mL (11.22.22 @ 17:15) (low)   Free Thyroxine, Serum: 0.9 ng/dL (11.22.22 @ 20:04) (low normal range)   Triiodothyronine, Total (T3 Total): 71 ng/dL (11.22.22 @ 17:15) (low)  HyperNatremic to 151   weakness, 20lbs weight loss, some visual symptoms/tearing per HPI   Has a goiter with dysphagia as well   recent steroids? although would not cause undetectable TSH   Recommendations:   -   -      #R. Adrenal Incidentaloma   indeterminant R. adrenal nodule 2x1.6x1.4cm   Recommendations:   - LDDST, PRA, PAC, plasma metanephrines & 24hr urine   - outpatient follow up with endocrinology for further work up/evaluation     Vane Reyes,    Endocrine Fellow  For follow up questions, discharge recommendations, or new consults please call answering service at [phone] (weekdays), [phone] (nights/weekends). For nonurgent matters, please email [email or web address] or [email or web address]      82 yo F w/Hx of HTN and HLD p/w dysphagia and generalized weakness iso weight loss and poor po intake suspected to be related to thyroid MNG. Patient also noted to have abnormal tfts and a R. adrenal nodule. Endocrine consulted for assistance with further evaluation & management of abnormal TFTs, MNG, and adrenal incidentaloma.     #Abnormal TFTs: differential includes subclinical hyperthyroidism 2/2 toxic nodule, graves vs less likely central hypothyroidism   #Thyroid MNG Goiter with tracheal deviation  Thyroid Stimulating Hormone, Serum: <0.10 uIU/mL (11.22.22 @ 17:15) (low)   Free Thyroxine, Serum: 0.9 ng/dL (11.22.22 @ 20:04) (low normal range)   Triiodothyronine, Total (T3 Total): 71 ng/dL (11.22.22 @ 17:15) (low)  HyperNatremic to 151   weakness, 20lbs weight loss, some visual symptoms/tearing per HPI   Has a goiter with dysphagia as well   recent steroids? although would not cause undetectable TSH   Recommendations:   -   -      #R. Adrenal Incidentaloma   indeterminant R. adrenal nodule 2x1.6x1.4cm   Recommendations:   - LDDST, PRA, PAC, plasma metanephrines & 24hr urine   - outpatient follow up with endocrinology for further work up/evaluation     Vane Reyes,    Endocrine Fellow  For follow up questions, discharge recommendations, or new consults please call answering service at [phone] (weekdays), [phone] (nights/weekends). For nonurgent matters, please email [email or web address] or [email or web address]      82 yo F w/Hx of HTN and HLD p/w dysphagia and generalized weakness iso weight loss and poor po intake suspected to be related to thyroid MNG. Patient also noted to have abnormal tfts and a R. adrenal nodule. Endocrine consulted for assistance with further evaluation & management of abnormal TFTs, MNG, and adrenal incidentaloma.     #Abnormal TFTs: differential includes subclinical hyperthyroidism 2/2 toxic nodule, thyroiditis, graves vs less likely central hypothyroidism  #Thyroid MNG Goiter with tracheal deviation  Thyroid Stimulating Hormone, Serum: <0.10 uIU/mL (11.22.22 @ 17:15) (low)   Free Thyroxine, Serum: 0.9 ng/dL (11.22.22 @ 20:04) (low normal range)   Triiodothyronine, Total (T3 Total): 71 ng/dL (11.22.22 @ 17:15) (low)  HyperNatremic to 151   vague symptoms including weakness, 20lbs weight loss, some visual symptoms/tearing, constipation/diarrhea, cold intolerance, anxiety  Has a goiter with dysphagia as well   no recent steroids, although this would not cause undetectable TSH   did receive ct iv contrast  Recommendations:   - check TSI, TSHrAB  - check US thyroid   - ENT consult noted   - Avoid further CT iodinated contrast as this can worsen underlying hyperthyroidism   - HR currently in 50s, will hold of beta blocker for now   - Hold off on Thionamides for now until work up is completed   - Outpatient uptake/scan prior to any thyroid FNA; would not biopsy if any hot nodules   - Outpatient endocrinology follow up at the location provided below:     [facility]   [address] [facility] [address]  [place] [id number] [phone]    #R. Adrenal Incidentaloma   indeterminant R. adrenal nodule 2x1.6x1.4cm   Recommendations:   - Check PRA, PAC, plasma metanephrines   - Conduct Low Dose Dexamethasone Suppression test - Dexamethasone 1mg PO x 1 dose tonight at 11pm, Check 8AM Cortisol and Dexamethasone level tomorrow AM. IF AM Cortisol < 1.8, this rules out adrenal hypercortisolism  - outpatient endocrinology follow up as above     Discussed with Dr. Daniel Reyes, DO   Endocrine Fellow  For follow up questions, discharge recommendations, or new consults please call answering service at [phone] (weekdays), [phone] (nights/weekends). For nonurgent matters, please email [email or web address] or [email or web address]      82 yo F w/Hx of HTN and HLD p/w dysphagia and generalized weakness iso weight loss and poor po intake suspected to be related to thyroid MNG. Patient also noted to have abnormal tfts and a R. adrenal nodule. Endocrine consulted for assistance with further evaluation & management of abnormal TFTs, MNG, and adrenal incidentaloma.     #Abnormal TFTs: differential includes subclinical hyperthyroidism 2/2 toxic nodule, thyroiditis, graves vs less likely central hypothyroidism  #Thyroid MNG Goiter with tracheal deviation  Thyroid Stimulating Hormone, Serum: <0.10 uIU/mL (11.22.22 @ 17:15) (low)   Free Thyroxine, Serum: 0.9 ng/dL (11.22.22 @ 20:04) (low normal range)   Triiodothyronine, Total (T3 Total): 71 ng/dL (11.22.22 @ 17:15) (low)  HyperNatremic to 151   vague symptoms including weakness, 20lbs weight loss, some visual symptoms/tearing, constipation/diarrhea, cold intolerance, anxiety  Has a goiter with dysphagia as well   no recent steroids, although this would not cause undetectable TSH   did receive ct iv contrast  Recommendations:   - check TSI, TSHrAB  - check US thyroid with doppler  - ENT consult noted   - Avoid further CT iodinated contrast as this can worsen underlying hyperthyroidism   - HR currently in 50s, will hold of beta blocker for now   - Hold off on Thionamides for now until work up is completed   - Outpatient uptake/scan prior to any thyroid FNA if TSH remains suppressed --> cannot occur for several weeks as she received a contrast load this admission; would not biopsy if any hot nodules   - Outpatient endocrinology follow up at the location provided below:     [facility]   [address] [place] [address] [address]  [facility] [id number] [phone]    #R. Adrenal Incidentaloma   indeterminant R. adrenal nodule 2x1.6x1.4cm   Recommendations:   - Check PRA, PAC, plasma metanephrines   - Conduct Low Dose Dexamethasone Suppression test - Dexamethasone 1mg PO x 1 dose tonight at 11pm, Check 8AM Cortisol and Dexamethasone level tomorrow AM. IF AM Cortisol < 1.8, this rules out adrenal hypercortisolism  - outpatient endocrinology follow up as above     Discussed with Dr. Daniel Reyes, DO   Endocrine Fellow  For follow up questions, discharge recommendations, or new consults please call answering service at [phone] (weekdays), [phone] (nights/weekends). For nonurgent matters, please email [email or web address] or [email or web address]

## 2022-11-23 NOTE — CONSULT NOTE ADULT - ATTENDING COMMENTS
80 yo F w/Hx with dysphagia and weight loss suspected to be related to thyroid MNG, found to have low TSH and normal FT4 c/w subclinical hyperthyroidism, also with right adrenal incidentaloma. Pt received contrast for imaging this admission. Will need outpatient endo follow up. Check labs as above to work up further. Can initiate adrenal nodule biochemical workup while admitted as above. Agree with obtaining ENT consult given dysphagia. Based on TSH suppression and age, pt would need treatment of underlying etiology of subclinical hyperthyroidism. No steroids received this admission. No tachycardia and hemodynamically stable. Will need to consider thyroid uptake and scan as outpatient - cannot do now with recent contrast load. Patient is high risk and high level decision making.

## 2022-11-23 NOTE — PATIENT PROFILE ADULT - FALL HARM RISK - HARM RISK INTERVENTIONS

## 2022-11-23 NOTE — DIETITIAN INITIAL EVALUATION ADULT - OTHER INFO
82 yo F w/Hx of HTN and HLD p/w generalized weakness likely 2/2 poor po intake related to enlarging Multinodular goiter found to be in likely subclinical hyperthyroidism. Patient NPO due to swallowing difficulty, noted with enlarging goiter.  Patient noted with 20 pound weight loss over 6 week period due to swallowing difficulty.   80 yo F w/Hx of HTN and HLD p/w generalized weakness likely 2/2 poor po intake related to enlarging Multinodular goiter found to be in likely subclinical hyperthyroidism. Patient NPO due to swallowing difficulty, noted with enlarging goiter.  Patient noted with 20 pound weight loss over 6 week period due to swallowing difficulty.      Family present at time of visit.  Patient reported to weigh 124 pounds 6 weeks ago and at recent physician appointment, patient weighed 104 pounds as reported by daughter.  Weight on admission - 51.7kg (114.1 pounds) ? possible admit weight discrepancy, will continue to trend inpatient weights.    Patient was fixated on going home at time of visit.  SLP assessed patient just prior to RD visit and reported that a pureed diet with thin liquids was going to be recommended. RD attempted to get food preferences, however, patient very selective and not amenable to many offerings.  Daughter in law to provide patient with desired foods from home; only drinks Boost, and therefore, only wants the Boost she has at home.  RD discussed that if patient cannot take sufficient nutrition by mouth due to swallowing difficulties, there are short and long term options to meet nutritional needs i.e. enteral feedings; per daughter, patient absolutely does not want alternate means of nutrition.

## 2022-11-24 NOTE — PROGRESS NOTE ADULT - NSPROGADDITIONALINFOA_GEN_ALL_CORE
Pt of PCP Dr. Larson at [facility].    Thyroid Us pending.  ENT biopsy pending.   monitor nutrition status on pureed diet.     - Dr. MARISELA Benavidez ([facility])  - [phone]

## 2022-11-25 NOTE — PROGRESS NOTE ADULT - SUBJECTIVE AND OBJECTIVE BOX
Interval history:  No acute overnight events  Patient states that she feels well- denies any complaints.     MEDICATIONS  (STANDING):  atorvastatin 10 milliGRAM(s) Oral at bedtime  enoxaparin Injectable 40 milliGRAM(s) SubCutaneous every 24 hours  lactated ringers. 1000 milliLiter(s) (75 mL/Hr) IV Continuous <Continuous>  losartan 100 milliGRAM(s) Oral daily  potassium phosphate IVPB 30 milliMole(s) IV Intermittent once    MEDICATIONS  (PRN):      Allergies    fish (Other)  penicillin (Hives)    Intolerances      Review of Systems:  Constitutional: No fever  Eyes: No blurry vision  Neuro: No tremors  HEENT: No pain  Cardiovascular: No chest pain, palpitations  Respiratory: No SOB, no cough  GI: No nausea, vomiting, abdominal pain  : No dysuria  Skin: no rash  Psych: no depression  Endocrine: no polyuria, polydipsia  Hem/lymph: no swelling  Osteoporosis: no fractures    ALL OTHER SYSTEMS REVIEWED AND NEGATIVE    UNABLE TO OBTAIN    PHYSICAL EXAM:  VITALS: T(C): 36.3 (11-25-22 @ 10:08)  T(F): 97.3 (11-25-22 @ 10:08), Max: 98.1 (11-24-22 @ 16:59)  HR: 68 (11-25-22 @ 10:08) (60 - 68)  BP: 124/74 (11-25-22 @ 10:08) (124/74 - 167/75)  RR:  (18 - 18)  SpO2:  (97% - 100%)  Wt(kg): --  GENERAL: NAD, well-groomed, well-developed  EYES: No proptosis, no lid lag, anicteric  HEENT:  Atraumatic, Normocephalic, moist mucous membranes  THYROID: Normal size, no palpable nodules  RESPIRATORY: Clear to auscultation bilaterally; No rales, rhonchi, wheezing, or rubs  CARDIOVASCULAR: Regular rate and rhythm; No murmurs; no peripheral edema  GI: Soft, nontender, non distended, normal bowel sounds  SKIN: Dry, intact, No rashes or lesions  MUSCULOSKELETAL: Full range of motion, normal strength  NEURO: sensation intact, extraocular movements intact, no tremor, normal reflexes  PSYCH: Alert and oriented x 3, normal affect, normal mood  CUSHING'S SIGNS: no striae        11-25    141  |  101  |  15  ----------------------------<  85  3.3<L>   |  27  |  0.48<L>    eGFR: 96    Ca    8.8      11-25  Mg     1.80     11-25  Phos  2.0     11-25    TPro  5.9<L>  /  Alb  3.0<L>  /  TBili  0.4  /  DBili  x   /  AST  17  /  ALT  10  /  AlkPhos  86  11-23          Thyroid Function Tests:  11-23 @ 01:30 TSH -- FreeT4 -- T3 -- Anti TPO <10.0 Anti Thyroglobulin Ab <20.0 TSI --  11-22 @ 20:04 TSH -- FreeT4 0.9 T3 -- Anti TPO -- Anti Thyroglobulin Ab -- TSI --                      
SUBJECTIVE/ OVERNIGHT EVENTS:  US thyroid keeps getting canceled.  when I call [phone], dept don't   pt feels okay  on pureed diet  at this point, likely outpt work up more efficient.   no cp, no sob, no n/v/d. no abdominal pain.  no headache, no dizziness.   encouraged PO intake.   HECTOR Mart spoke with Endo and ENT about this.      --------------------------------------------------------------------------------------------  LABS:                        14.7   7.70  )-----------( 276      ( 25 Nov 2022 06:55 )             45.9     11-25    141  |  101  |  15  ----------------------------<  85  3.3<L>   |  27  |  0.48<L>    Ca    8.8      25 Nov 2022 06:55  Phos  2.0     11-25  Mg     1.80     11-25        CAPILLARY BLOOD GLUCOSE                RADIOLOGY & ADDITIONAL TESTS:    Imaging Personally Reviewed:  [x] YES  [ ] NO    Consultant(s) Notes Reviewed:  [x] YES  [ ] NO    MEDICATIONS  (STANDING):  atorvastatin 10 milliGRAM(s) Oral at bedtime  enoxaparin Injectable 40 milliGRAM(s) SubCutaneous every 24 hours  lactated ringers. 1000 milliLiter(s) (75 mL/Hr) IV Continuous <Continuous>  losartan 100 milliGRAM(s) Oral daily    MEDICATIONS  (PRN):      Care Discussed with Consultants/Other Providers [x] YES  [ ] NO    Vital Signs Last 24 Hrs  T(C): 37.1 (25 Nov 2022 17:13), Max: 37.1 (25 Nov 2022 17:13)  T(F): 98.8 (25 Nov 2022 17:13), Max: 98.8 (25 Nov 2022 17:13)  HR: 65 (25 Nov 2022 17:13) (65 - 68)  BP: 152/66 (25 Nov 2022 17:13) (124/74 - 167/75)  BP(mean): --  RR: 18 (25 Nov 2022 17:13) (18 - 18)  SpO2: 96% (25 Nov 2022 17:13) (96% - 98%)    Parameters below as of 25 Nov 2022 17:13  Patient On (Oxygen Delivery Method): room air      I&O's Summary    25 Nov 2022 07:01  -  25 Nov 2022 17:33  --------------------------------------------------------  IN: 250 mL / OUT: 0 mL / NET: 250 mL        PHYSICAL EXAM:  GENERAL: NAD, thin-elderly, comfortable  HEAD:  Atraumatic, Normocephalic  EYES: EOMI, PERRLA, conjunctiva and sclera clear  NECK: Supple, No JVD, nodular thyroid   CHEST/LUNG: mild decrease breath sounds bilaterally; No wheeze   HEART: Regular rate and rhythm; No murmurs, rubs, or gallops  ABDOMEN: Soft, Nontender, Nondistended; Bowel sounds present  Neuro: AAOx3, no focal weakness   EXTREMITIES:  2+ Peripheral Pulses, No clubbing, cyanosis, or edema  SKIN: No rashes or lesions 
SUBJECTIVE/ OVERNIGHT EVENTS:  US thyroid re-ordered  no cp, no sob, no n/v/d. no abdominal pain.  no headache, no dizziness.   tolerating diet       --------------------------------------------------------------------------------------------  LABS:                        13.7   9.32  )-----------( 298      ( 24 Nov 2022 05:40 )             43.2     11-24    145  |  104  |  17  ----------------------------<  83  3.3<L>   |  26  |  0.50    Ca    8.9      24 Nov 2022 17:43  Phos  1.8     11-24  Mg     1.80     11-24    TPro  5.9<L>  /  Alb  3.0<L>  /  TBili  0.4  /  DBili  x   /  AST  17  /  ALT  10  /  AlkPhos  86  11-23      CAPILLARY BLOOD GLUCOSE                RADIOLOGY & ADDITIONAL TESTS:    Imaging Personally Reviewed:  [x] YES  [ ] NO    Consultant(s) Notes Reviewed:  [x] YES  [ ] NO    MEDICATIONS  (STANDING):  atorvastatin 10 milliGRAM(s) Oral at bedtime  enoxaparin Injectable 40 milliGRAM(s) SubCutaneous every 24 hours  lactated ringers. 1000 milliLiter(s) (75 mL/Hr) IV Continuous <Continuous>  losartan 100 milliGRAM(s) Oral daily    MEDICATIONS  (PRN):      Care Discussed with Consultants/Other Providers [x] YES  [ ] NO    Vital Signs Last 24 Hrs  T(C): 36.7 (24 Nov 2022 16:59), Max: 36.7 (24 Nov 2022 10:40)  T(F): 98.1 (24 Nov 2022 16:59), Max: 98.1 (24 Nov 2022 10:40)  HR: 60 (24 Nov 2022 16:59) (60 - 65)  BP: 151/75 (24 Nov 2022 16:59) (147/55 - 167/75)  BP(mean): --  RR: 18 (24 Nov 2022 16:59) (18 - 18)  SpO2: 100% (24 Nov 2022 16:59) (97% - 100%)    Parameters below as of 24 Nov 2022 16:59  Patient On (Oxygen Delivery Method): room air      I&O's Summary        PHYSICAL EXAM:  GENERAL: NAD, thin-elderly, comfortable  HEAD:  Atraumatic, Normocephalic  EYES: EOMI, PERRLA, conjunctiva and sclera clear  NECK: Supple, No JVD, nodular thyroid   CHEST/LUNG: mild decrease breath sounds bilaterally; No wheeze   HEART: Regular rate and rhythm; No murmurs, rubs, or gallops  ABDOMEN: Soft, Nontender, Nondistended; Bowel sounds present  Neuro: AAOx3, no focal weakness   EXTREMITIES:  2+ Peripheral Pulses, No clubbing, cyanosis, or edema  SKIN: No rashes or lesions 
SUBJECTIVE/ OVERNIGHT EVENTS:  comfortable  no cp, no sob, no n/v/d. no abdominal pain.  no headache, no dizziness.   still NPO, waiting for speech tayler  wants to go home for Thanksgiving.     --------------------------------------------------------------------------------------------  LABS:                        16.0   12.75 )-----------( 341      ( 22 Nov 2022 17:15 )             51.4     11-22    151<H>  |  104  |  36<H>  ----------------------------<  113<H>  3.7   |  28  |  1.02    Ca    10.2      22 Nov 2022 17:15  Phos  3.5     11-22  Mg     2.40     11-22    TPro  6.9  /  Alb  3.5  /  TBili  0.4  /  DBili  x   /  AST  21  /  ALT  10  /  AlkPhos  99  11-22    PT/INR - ( 22 Nov 2022 17:15 )   PT: 15.8 sec;   INR: 1.36 ratio         PTT - ( 22 Nov 2022 17:15 )  PTT:30.3 sec  CAPILLARY BLOOD GLUCOSE                RADIOLOGY & ADDITIONAL TESTS:    Imaging Personally Reviewed:  [x] YES  [ ] NO    Consultant(s) Notes Reviewed:  [x] YES  [ ] NO    MEDICATIONS  (STANDING):  enoxaparin Injectable 40 milliGRAM(s) SubCutaneous every 24 hours  lactated ringers. 1000 milliLiter(s) (75 mL/Hr) IV Continuous <Continuous>    MEDICATIONS  (PRN):      Care Discussed with Consultants/Other Providers [x] YES  [ ] NO    Vital Signs Last 24 Hrs  T(C): 36.4 (23 Nov 2022 09:45), Max: 36.6 (22 Nov 2022 23:35)  T(F): 97.6 (23 Nov 2022 09:45), Max: 97.8 (22 Nov 2022 23:35)  HR: 58 (23 Nov 2022 09:45) (57 - 108)  BP: 160/69 (23 Nov 2022 09:45) (115/75 - 162/57)  BP(mean): --  RR: 18 (23 Nov 2022 09:45) (17 - 18)  SpO2: 100% (23 Nov 2022 09:45) (96% - 100%)    Parameters below as of 23 Nov 2022 09:45  Patient On (Oxygen Delivery Method): room air      I&O's Summary      PHYSICAL EXAM:  GENERAL: NAD, thin-elderly, comfortable  HEAD:  Atraumatic, Normocephalic  EYES: EOMI, PERRLA, conjunctiva and sclera clear  NECK: Supple, No JVD, nodular thyroid   CHEST/LUNG: mild decrease breath sounds bilaterally; No wheeze   HEART: Regular rate and rhythm; No murmurs, rubs, or gallops  ABDOMEN: Soft, Nontender, Nondistended; Bowel sounds present  Neuro: AAOx3, no focal weakness   EXTREMITIES:  2+ Peripheral Pulses, No clubbing, cyanosis, or edema  SKIN: No rashes or lesions

## 2022-11-25 NOTE — PROGRESS NOTE ADULT - NSPROGADDITIONALINFOA_GEN_ALL_CORE
Pt of PCP Dr. Larson at [facility].    Thyroid Us pending.   ENT biopsy pending.   monitor nutrition status on pureed diet.   Physical therapy. Out of bed to chair with assistance.   dc planning.    - Dr. MARISELA Benavidez ([facility])  - [phone] Pt of PCP Dr. Larson at [facility].    Thyroid Us pending. ENT biopsy pending. both to be done as outpt.   monitor nutrition status on pureed diet.  (pt not interested in NG tube nor PEG tube at  this time).   Physical therapy. Out of bed to chair with assistance.   dc planning home. She is able to ambulate around without any assistance.     Addendum: outpt work up. d/w endo.   d/w pt's son and the daughter in law at bedside. All questions answered.  follow up info for [facility] provided with phone numbers.    - Dr. MARISELA Benavidez ([facility])  - [phone]

## 2022-11-25 NOTE — PROGRESS NOTE ADULT - ASSESSMENT
80 yo F w/Hx of HTN and HLD p/w generalized weakness likely 2/2 poor po intake related to enlarging Multinodular goiter found to be in likely subclinical hyperthyroidism
82 yo F w/Hx of HTN and HLD p/w dysphagia and generalized weakness iso weight loss and poor po intake suspected to be related to thyroid MNG. Patient also noted to have abnormal tfts and a R. adrenal nodule. Endocrine consulted for assistance with further evaluation & management of abnormal TFTs, MNG, and adrenal incidentaloma.     #Abnormal TFTs: differential includes subclinical hyperthyroidism 2/2 toxic nodule, thyroiditis, graves vs less likely central hypothyroidism  #Thyroid MNG Goiter with tracheal deviation  Thyroid Stimulating Hormone, Serum: <0.10 uIU/mL (11.22.22 @ 17:15) (low)   Free Thyroxine, Serum: 0.9 ng/dL (11.22.22 @ 20:04) (low normal range)   Triiodothyronine, Total (T3 Total): 71 ng/dL (11.22.22 @ 17:15) (low)  - Patient did receive IV contrast this admission     Recommendations:   - TSI and TRAB antibodies are pending   - check US thyroid with doppler- cancelled in system. Spoke with primary team- will try to have it re-ordered.   - ENT consult noted   - Avoid further CT iodinated contrast as this can worsen underlying hyperthyroidism   - HR currently in 60s, will hold of beta blocker for now   - Hold off on Thionamides for now until work up is completed   - Outpatient uptake/scan prior to any thyroid FNA if TSH remains suppressed --> cannot occur for several weeks as she received a contrast load this admission; would not biopsy if any hot nodules   - Outpatient endocrinology follow up at the location provided below:     [facility]   [address] [place] [address]  [place] [id number] [phone]    #R. Adrenal Incidentaloma   indeterminant R. adrenal nodule 2x1.6x1.4cm   Recommendations:   - Patient did not suppress with 1mg Dexamethasone test- AM cortisol following day noted to be 13.7. Timing of exam was not ideal- Dex was given at 10pm and AM cortisol checked at 5:40am plus high chance of false positive in the hospital setting. Dexamethasone level pending. Will need to be repeated as outpatient. She clinically doesn't have any cushingoid features   - [place] noted to be 3.3. Renin and metanephrines pending.   - outpatient endocrinology follow up as above       Meli Smith DO   Attending Physician   Department of Endocrinology, Diabetes and Metabolism     If before 9AM or after 5PM, or on weekends/holidays, please call the Endocrine answering service for assistance ([phone]).  For nonurgent matters, please email [email or web address] for assistance.         
80 yo F w/Hx of HTN and HLD p/w generalized weakness likely 2/2 poor po intake related to enlarging Multinodular goiter found to be in likely subclinical hyperthyroidism
82 yo F w/Hx of HTN and HLD p/w generalized weakness likely 2/2 poor po intake related to enlarging Multinodular goiter found to be in likely subclinical hyperthyroidism

## 2022-11-25 NOTE — DISCHARGE NOTE PROVIDER - NSDCFUADDAPPT_GEN_ALL_CORE_FT
Please follow up  call in 1 week for appointment   ( you need US thyroid, nuclear scan, needle biopsy )    [facility]  [address]  [place] [id number] [phone]    Please follow up outpatient with ENT clinic () - call for appointment within 1-2 weeks but first vcall for Endocrinology appointment    Please follow up  call in 1 week for appointment   ( you need US thyroid, nuclear scan, needle biopsy )    [facility]  [address]  [place] [id number] [phone]    Please follow up outpatient with ENT clinic () - call for appointment within 1-2 weeks but first call for Endocrinology appointment

## 2022-11-25 NOTE — PROGRESS NOTE ADULT - PROBLEM SELECTOR PLAN 7
DVT ppx: Lovenox  An indeterminant right adrenal nodule measures approximately 2 x 1.6 x 1.4 cm.   Outpt f/u upon d/c

## 2022-11-25 NOTE — CHART NOTE - NSCHARTNOTEFT_GEN_A_CORE
Medicine NP note    Patient potassium level 3.3, phosphorus level 2, pt is on puree diet , poor intake , Attending Dr. Benavidez notified , potasium phosphate IV 30 millimoles ordered and KCL 10 meq IV PB x 3 ordered , night ACP to f/u on BMP. There is no ability to do US for thyroid , ENT notified , is ok per them to do outpt, Endo notified , pt to f/u with them for US thyroid, nuclear scan and FNA outpatient . Pt's family aware. Patient medically ready for D/ C when electrolytes repleted . Pt to have PT evaluation in am /    Brittny Bergeron NP-C  Department of Medicine- ACP  In House Pager #[phone]

## 2022-11-25 NOTE — DISCHARGE NOTE PROVIDER - CARE PROVIDER_API CALL
Chace Larson  INTERNAL MEDICINE  [address] [place] [address], [facility] [address]  [place] [address]  Phone: [phone]  Fax: [phone]  Follow Up Time: 1 week

## 2022-11-25 NOTE — PROGRESS NOTE ADULT - NUTRITIONAL ASSESSMENT
This patient has been assessed with a concern for Malnutrition and has been determined to have a diagnosis/diagnoses of Severe protein-calorie malnutrition and Underweight (BMI < 19).    This patient is being managed with:   Diet Pureed-  Entered: Nov 23 2022  3:10PM    
This patient has been assessed with a concern for Malnutrition and has been determined to have a diagnosis/diagnoses of Severe protein-calorie malnutrition and Underweight (BMI < 19).    This patient is being managed with:   Diet Pureed-  Entered: Nov 23 2022  3:10PM

## 2022-11-25 NOTE — PROGRESS NOTE ADULT - PROBLEM SELECTOR PLAN 1
-Pt having weakness due to dysphagia and decr PO intake for 6 weeks. 20lbs weight loss for the past 6 weeks  -Labs significant for TSH <0.10, normal T4 of 6.38, and slight low T3 of 71. electrolytes wnl, RVP negative   -CT Chest/Neck showed Multinodular goiter causing rightward deviation of the trachea, without significant airway narrowing. The esophagus appears within normal limits  -Weakness likely 2/2 decr po intake vs subclinical hyperthyroidism  -Slight elevation in WBC likely reactive from hypovolemia
-Pt having weakness due to dysphagia and decr PO intake for 6 weeks. 20lbs weight loss for the past 6 weeks  -Labs significant for TSH <0.10, normal T4 of 6.38, and slight low T3 of 71. electrolytes wnl, RVP negative   -CT Chest/Neck showed Multinodular goiter causing rightward deviation of the trachea, without significant airway narrowing. The esophagus appears within normal limits  -Weakness likely 2/2 decr po intake vs subclinical hyperthyroidism  -Slight elevation in WBC likely reactive from hypovolemia
-Pt having weakness due to dysphagia and decr PO intake for 6 weeks. 20lbs weight loss for the past 6 weeks  -Labs significant for TSH <0.10, normal T4 of 6.38, and slight low T3 of 71. electrolytes wnl, RVP negative   -CT Chest/Neck showed multinodular goiter causing rightward deviation of the trachea, without significant airway narrowing. The esophagus appears within normal limits  -Weakness likely 2/2 decr po intake vs subclinical hyperthyroidism, encouraged PO intake.   -Slight elevation in WBC likely reactive from hypovolemia

## 2022-11-25 NOTE — DISCHARGE NOTE PROVIDER - NSDCCPCAREPLAN_GEN_ALL_CORE_FT
PRINCIPAL DISCHARGE DIAGNOSIS  Diagnosis: Weakness  Assessment and Plan of Treatment:       SECONDARY DISCHARGE DIAGNOSES  Diagnosis: Dysphagia  Assessment and Plan of Treatment:     Diagnosis: Subclinical hyperthyroidism  Assessment and Plan of Treatment:     Diagnosis: Hypernatremia  Assessment and Plan of Treatment:     Diagnosis: Hypertension  Assessment and Plan of Treatment:     Diagnosis: HLD (hyperlipidemia)  Assessment and Plan of Treatment:      PRINCIPAL DISCHARGE DIAGNOSIS  Diagnosis: Weakness  Assessment and Plan of Treatment: You were admitted due to weakness caused by decreased oral intake and dysphagia and loosing 20 lbs over 6 weeks.   -CT Chest/Neck showed multinodular goiter causing rightward deviation of the trachea, without significant airway narrowing. The esophagus appears within normal limits  -Weakness als is caused due to subclinical hyperthyroidism,. You were seen by Endocrinology who evaluated you and discussed with you plan of care. Please follow up with them for out pt tests and managament .      SECONDARY DISCHARGE DIAGNOSES  Diagnosis: Dysphagia  Assessment and Plan of Treatment: You have multinodular goiter on your thyroid and CT of neck was done and shows that it is no squeezing your trachea only bends it to the side so size of breathing pipe is the same and your throat ( pharynx ) on CT looks normal withut any narrowing making physically barrier to pass food . Therefore mostly difficulty swallowing may be caused due to anxiety caused by being  afraid to choke , You are declinig at this monent to have peg tube. Please eat with small portion puree and slowly to prevent aspiration pneumonia in upright sitting position. return to ER if any episode of choking or cough after choking, fever , SOB    Diagnosis: Subclinical hyperthyroidism  Assessment and Plan of Treatment: Your body makes too much thyroid hormone.    Call your doctor if you feel more anxious, irritable, trouble sleeping, trembling, sweating a lot, fast heartbeat, tired, weight loss, diarrhea, abnormal periods. Your doctor will monitor thyroid blood work regularly to monitor levels. . Follow up with Endocrinology within 1 week - call for appointment - see follow up section of discharge document. They need to do thyroid sonogram , nuclear scan and schedule needle biopsy. Please call also ENT within 1-2 weeks to make appointment with them - also see follow up section of discharge documents.       Diagnosis: Hypernatremia  Assessment and Plan of Treatment: Your sodium level on admission was elevated but improved with intravenous hydration and was caused by decreased intake of fluids by mouth due to difficulty swallowing due to thyroid goiter.Please drink water with small sips to prevent choking to keep yourself hydrated.    Diagnosis: Hypertension  Assessment and Plan of Treatment: Continue blood pressure medication regimen as directed. Monitor for any visual changes, headaches or dizziness.  Monitor blood pressure regularly.  Follow up with your primary care provider for further management for high blood pressure.  Notify your doctor if you have any of the following symptoms:   Dizziness, Lightheadedness, Blurry vision, Headache, Chest pain, Shortness of breath    Diagnosis: HLD (hyperlipidemia)  Assessment and Plan of Treatment: !Continue prescribed medications to control your cholesterol levels and a DASH (Low fat/salt) diet. Follow up with your primary care provider upon discharge for further management and monitoring of cholesterol levels.      Diagnosis: Hypokalemia  Assessment and Plan of Treatment: Your potasium level; was decreased and you received intravenous potassium supplement ssince orally was difficult for ou to take it since you pass only puree diet dtstage with speech abd swallow. We are monitoring level of potassiun=m in your blood and if you are getting this discharge documents it is mean that we replaced level to acceptable number and safe for you to be discharged.     PRINCIPAL DISCHARGE DIAGNOSIS  Diagnosis: Weakness  Assessment and Plan of Treatment: You were admitted due to weakness caused by decreased oral intake and dysphagia and loosing 20 lbs over 6 weeks.   -CT Chest/Neck showed multinodular goiter causing rightward deviation of the trachea, without significant airway narrowing. The esophagus appears within normal limits  -Weakness als is caused due to subclinical hyperthyroidism,. You were seen by Endocrinology who evaluated you and discussed with you plan of care. Please follow up with them for out pt tests and managament .  - Outpatient uptake/scan prior to any thyroid FNA; would not biopsy if any hot nodules   - Outpatient endocrinology follow up at the location provided below  [facility]  [address] [place] [address] [address]  [place] [id number] [phone]  outpatient FNA of thyroid nodules ( after US thyroid, after nuclear scan )   [facility]  [address] [place] [address] [address]  [place] [address]  [phone]  - follow up outpatient ENT clinic ()         SECONDARY DISCHARGE DIAGNOSES  Diagnosis: Dysphagia  Assessment and Plan of Treatment: You have multinodular goiter on your thyroid and CT of neck was done and shows that it is no squeezing your trachea only bends it to the side so size of breathing pipe is the same and your throat ( pharynx ) on CT looks normal withut any narrowing making physically barrier to pass food . Therefore mostly difficulty swallowing may be caused due to anxiety caused by being  afraid to choke , You are declinig at this monent to have peg tube. Please eat with small portion puree and slowly to prevent aspiration pneumonia in upright sitting position. return to ER if any episode of choking or cough after choking, fever , SOB    Diagnosis: Subclinical hyperthyroidism  Assessment and Plan of Treatment: Your body makes too much thyroid hormone.    Call your doctor if you feel more anxious, irritable, trouble sleeping, trembling, sweating a lot, fast heartbeat, tired, weight loss, diarrhea, abnormal periods. Your doctor will monitor thyroid blood work regularly to monitor levels. . Follow up with Endocrinology within 1 week - call for appointment - see follow up section of discharge document. They need to do thyroid sonogram , nuclear scan and schedule needle biopsy. Please call also ENT within 1-2 weeks to make appointment with them - also see follow up section of discharge documents.       Diagnosis: Hypernatremia  Assessment and Plan of Treatment: Your sodium level on admission was elevated but improved with intravenous hydration and was caused by decreased intake of fluids by mouth due to difficulty swallowing due to thyroid goiter.Please drink water with small sips to prevent choking to keep yourself hydrated.    Diagnosis: Hypertension  Assessment and Plan of Treatment: Continue blood pressure medication regimen as directed. Monitor for any visual changes, headaches or dizziness.  Monitor blood pressure regularly.  Follow up with your primary care provider for further management for high blood pressure.  Notify your doctor if you have any of the following symptoms:   Dizziness, Lightheadedness, Blurry vision, Headache, Chest pain, Shortness of breath    Diagnosis: HLD (hyperlipidemia)  Assessment and Plan of Treatment: !Continue prescribed medications to control your cholesterol levels and a DASH (Low fat/salt) diet. Follow up with your primary care provider upon discharge for further management and monitoring of cholesterol levels.      Diagnosis: Hypokalemia  Assessment and Plan of Treatment: Your potasium level; was decreased and you received intravenous potassium supplement ssince orally was difficult for ou to take it since you pass only puree diet dtstage with speech abd swallow. We are monitoring level of potassiun=m in your blood and if you are getting this discharge documents it is mean that we replaced level to acceptable number and safe for you to be discharged.     PRINCIPAL DISCHARGE DIAGNOSIS  Diagnosis: Weakness  Assessment and Plan of Treatment: You were admitted due to weakness caused by decreased oral intake and dysphagia and loosing 20 lbs over 6 weeks.   -CT Chest/Neck showed multinodular goiter causing rightward deviation of the trachea, without significant airway narrowing. The esophagus appears within normal limits  -Weakness als is caused due to subclinical hyperthyroidism,. You were seen by Endocrinology who evaluated you and discussed with you plan of care. Please follow up with them for out pt tests and managament .  - Outpatient uptake/scan prior to any thyroid FNA; would not biopsy if any hot nodules   - Outpatient endocrinology follow up at the location provided below  [facility]  [address] [place] [address] [address]  [place] [id number] [phone]  outpatient FNA of thyroid nodules ( after US thyroid, after nuclear scan )   [facility]  [address] [place] [address] [address]  [place] [address]  [phone]  - follow up outpatient ENT clinic ()         SECONDARY DISCHARGE DIAGNOSES  Diagnosis: Dysphagia  Assessment and Plan of Treatment: You have multinodular goiter on your thyroid and CT of neck was done and shows that it is no squeezing your trachea only bends it to the side so size of breathing pipe is the same and your throat ( pharynx ) on CT looks normal withut any narrowing making physically barrier to pass food . Therefore mostly difficulty swallowing may be caused due to anxiety caused by being  afraid to choke , You are declinig at this monent to have peg tube. Please eat with small portion puree and slowly to prevent aspiration pneumonia in upright sitting position. return to ER if any episode of choking or cough after choking, fever , SOB    Diagnosis: Subclinical hyperthyroidism  Assessment and Plan of Treatment: Your body makes too much thyroid hormone.    Call your doctor if you feel more anxious, irritable, trouble sleeping, trembling, sweating a lot, fast heartbeat, tired, weight loss, diarrhea, abnormal periods. Your doctor will monitor thyroid blood work regularly to monitor levels. . Follow up with Endocrinology within 1 week - call for appointment - see follow up section of discharge document. They need to do thyroid sonogram , nuclear scan and schedule needle biopsy. Please call also ENT within 1-2 weeks to make appointment with them - also see follow up section of discharge documents.       Diagnosis: Adrenal nodule  Assessment and Plan of Treatment: R. Adrenal Incidentaloma   indeterminant R. adrenal nodule 2x1.6x1.4cm   Will need to be repeated as outpatient endocrinology follow up    Diagnosis: Hypernatremia  Assessment and Plan of Treatment: Your sodium level on admission was elevated but improved with intravenous hydration and was caused by decreased intake of fluids by mouth due to difficulty swallowing due to thyroid goiter.Please drink water with small sips to prevent choking to keep yourself hydrated.    Diagnosis: Hypertension  Assessment and Plan of Treatment: Continue blood pressure medication regimen as directed. Monitor for any visual changes, headaches or dizziness.  Monitor blood pressure regularly.  Follow up with your primary care provider for further management for high blood pressure.  Notify your doctor if you have any of the following symptoms:   Dizziness, Lightheadedness, Blurry vision, Headache, Chest pain, Shortness of breath    Diagnosis: HLD (hyperlipidemia)  Assessment and Plan of Treatment: !Continue prescribed medications to control your cholesterol levels and a DASH (Low fat/salt) diet. Follow up with your primary care provider upon discharge for further management and monitoring of cholesterol levels.      Diagnosis: Hypokalemia  Assessment and Plan of Treatment: Your potasium level; was decreased and you received intravenous potassium supplement ssince orally was difficult for ou to take it since you pass only puree diet dtstage with speech abd swallow. We are monitoring level of potassiun=m in your blood and if you are getting this discharge documents it is mean that we replaced level to acceptable number and safe for you to be discharged.

## 2022-11-25 NOTE — DISCHARGE NOTE PROVIDER - HOSPITAL COURSE
80 yo F w/Hx of HTN and HLD p/w generalized weakness likely 2/2 poor po intake related to enlarging Multinodular goiter found to be in likely subclinical hyperthyroidism     Problem/Plan - 1:  ·  Problem: Weakness.   ·  Plan: -Pt having weakness due to dysphagia and decr PO intake for 6 weeks. 20lbs weight loss for the past 6 weeks  -Labs significant for TSH <0.10, normal T4 of 6.38, and slight low T3 of 71. electrolytes wnl, RVP negative   -CT Chest/Neck showed multinodular goiter causing rightward deviation of the trachea, without significant airway narrowing. The esophagus appears within normal limits  -Weakness likely 2/2 decr po intake vs subclinical hyperthyroidism, encouraged PO intake.   -Slight elevation in WBC likely reactive from hypovolemia.     Problem/Plan - 2:  ·  Problem: Dysphagia.   ·  Plan: -CT Chest/Neck showed Multinodular goiter causing rightward deviation of the trachea, without significant airway narrowing. The esophagus appears within normal limits  -ENT consult appreciated.    - pt NPO, official S&S eval: resume diet:  Puree with Thin Liquids  -Continue to monitor BMP and replete electrolyte and needed.     Problem/Plan - 3:  ·  Problem: Subclinical hyperthyroidism.   ·  Plan: -Pt with labs concerning for subclinical hyperthyroidism  -Likely 2/2 multinodular goiter on imaging   -Thyroid ab test pending   - house Endo eval appreciated., May need radioactive iodine uptake in the future.   - US thyroid pending but this is almost impossible to be done in the hospital setting.   - d/w endo and ENT. outpt work up probably more efficient at point.  - if no objections, dc home with endo follow up.     Problem/Plan - 4:  ·  Problem: Hypernatremia.   ·  Plan: Pt with Na of 151 at admission  -Likely 2/2 hypovolemia from decr PO intake  -s/p IVF, repeat labs improving.     Problem/Plan - 5:  ·  Problem: Hypertension.   ·  Plan: med rec  resume home BP meds.     Problem/Plan - 6:  ·  Problem: Hyperlipemia.   ·  Plan: resume home BP meds.     Problem/Plan - 7:  ·  Problem: Prophylactic measure.   ·  Plan: DVT ppx: Lovenox  An indeterminant right adrenal nodule measures approximately 2 x 1.6 x 1.4 cm.   Outpt f/u upon d/c.     Pt of PCP Dr. Larson at [facility].    Thyroid Us pending. ENT biopsy pending. both to be done as outpt.   monitor nutrition status on pureed diet.  (pt not interested in NG tube nor PEG tube at  this time).   Physical therapy. Out of bed to chair with assistance.   dc planning home. She is able to ambulate around without any assistance.     Addendum: outpt work up. d/w endo.   d/w pt's son and the daughter in law at bedside. All questions answered.  follow up info for [facility] provided with phone numbers.    On ___ this case was reviewed with Dr. Benavidez the patient is medically stable and optimized for discharge. All medications were reviewed and prescriptions were sent to mutually agreed upon pharmacy.    82 yo F w/Hx of HTN and HLD p/w generalized weakness likely 2/2 poor po intake related to enlarging Multinodular goiter found to be in likely subclinical hyperthyroidism  Weakness.   -decr PO intake for 6 weeks. 20lbs weight loss for the past 6 weeks  -TSH <0.10, normal T4 of 6.38, and slight low T3 of 71  -RVP negative   -Weakness likely 2/2 decr po intake vs subclinical hyperthyroidism    Dysphagia.   -CT ->multinodular goiter causing rightward deviation of the trachea, without significant airway narrowing. The esophagus appears within normal limits  -ENT -outpt US    Subclinical hyperthyroidism.   -Thyroid ab test pending   - house Endo -May need radioactive iodine uptake in the future.   - US thyroid pending but this is almost impossible to be done in the hospital setting.   -dc home with endo follow up.    Hypernatremia.   -Na of 151 at admission  -Likely 2/2 hypovolemia from decr PO intake  -s/p IVF, repeat labs improving.      Hypokalemia   -supplement PRN         On ___ this case was reviewed with Dr. Benavidez the patient is medically stable and optimized for discharge. All medications were reviewed and prescriptions were sent to mutually agreed upon pharmacy.    82 yo F w/Hx of HTN and HLD p/w generalized weakness likely 2/2 poor po intake related to enlarging Multinodular goiter found to be in likely subclinical hyperthyroidism  Weakness.   -decr PO intake for 6 weeks. 20lbs weight loss for the past 6 weeks  -TSH <0.10, normal T4 of 6.38, and slight low T3 of 71  -RVP negative   -Weakness likely 2/2 decr po intake vs subclinical hyperthyroidism    Dysphagia.   -CT ->multinodular goiter causing rightward deviation of the trachea, without significant airway narrowing. The esophagus appears within normal limits  -ENT -outpt US    Subclinical hyperthyroidism.   -Thyroid ab test pending   - house Endo -May need radioactive iodine uptake in the future.   - US thyroid pending but this is almost impossible to be done in the hospital setting.   -dc home with endo follow up.    Hypernatremia.   -Na of 151 at admission  -Likely 2/2 hypovolemia from decr PO intake  -s/p IVF, repeat labs improving.      Hypokalemia   -supplement PRN         On 11/26 this case was reviewed with Dr. Benavidez the patient is medically stable and optimized for discharge. All medications were reviewed and prescriptions were sent to mutually agreed upon pharmacy.    82 yo F w/Hx of HTN and HLD p/w generalized weakness likely 2/2 poor po intake related to enlarging Multinodular goiter found to be in likely subclinical hyperthyroidism  Weakness.   -decr PO intake for 6 weeks. 20lbs weight loss for the past 6 weeks  -TSH <0.10, normal T4 of 6.38, and slight low T3 of 71  -RVP negative   -Weakness likely 2/2 decr po intake vs subclinical hyperthyroidism    Dysphagia.   -CT ->multinodular goiter causing rightward deviation of the trachea, without significant airway narrowing. The esophagus appears within normal limits  -ENT -outpt US    Subclinical hyperthyroidism.   -Thyroid ab test pending   - house Endo -May need radioactive iodine uptake in the future.   - US thyroid pending but this is almost impossible to be done in the hospital setting.   -LA home with endo follow up.    Hypernatremia.   -Na of 151 at admission  -Likely 2/2 hypovolemia from decr PO intake  -s/p IVF, repeat labs improving.      Hypokalemia   -supplement PRN         On 11/26 this case was reviewed with Dr. Benavidez the patient is medically stable and optimized for discharge. All medications were reviewed and prescriptions were sent to mutually agreed upon pharmacy.      Attending Addendum:  Patient seen and examined by me on the discharge day. Medications reviewed.  All questions answered in details. Follow up plan explained.  More than 30 mins were spent evaluating patient and coordinating care for discharge.  Discharge summary sent to pt's primary care physician at [facility].

## 2022-11-25 NOTE — PROGRESS NOTE ADULT - PROBLEM SELECTOR PLAN 4
Pt with Na of 151 at admission  -Likely 2/2 hypovolemia from decr PO intake  -s/p IVF, repeat labs pending
Pt with Na of 151 at admission  -Likely 2/2 hypovolemia from decr PO intake  -s/p IVF, repeat labs improving.
Pt with Na of 151 at admission  -Likely 2/2 hypovolemia from decr PO intake  -s/p IVF, repeat labs improving.

## 2022-11-25 NOTE — PROGRESS NOTE ADULT - PROBLEM SELECTOR PLAN 3
-Pt with labs concerning for subclinical hyperthyroidism  -Likely 2/2 multinodular goiter on imaging   -Thyroid ab test pending   - house Endo eval appreciated., May need radioactive iodine uptake  - US thyroid pending.
-Pt with labs concerning for subclinical hyperthyroidism  -Likely 2/2 multinodular goiter on imaging   -Thyroid ab test pending   - house Endo eval appreciated., May need radioactive iodine uptake in the future.   - US thyroid pending but this is almost impossible to be done in the hospital setting.   - d/w endo and ENT. outpt work up probably more efficient at point.  - if no objections, dc home with endo follow up.
-Pt with labs concerning for subclinical hyperthyroidism  -Likely 2/2 multinodular goiter on imaging   -Thyroid ab test pending   - Please consult house Endo, May need radioactive iodine uptake

## 2022-11-25 NOTE — DISCHARGE NOTE PROVIDER - NSDCMRMEDTOKEN_GEN_ALL_CORE_FT
aspirin 81 mg oral tablet: 1 tab(s) orally once a day  atorvastatin 10 mg oral tablet: 1 tab(s) orally once a day  losartan 100 mg oral tablet: 1 tab(s) orally once a day  Valium 5 mg oral tablet: 1 tab(s) orally once a day, As Needed  vitamin E 1000 intl units oral capsule: 1 cap(s) orally once a day   aspirin 81 mg oral tablet: 1 tab(s) orally once a day  atorvastatin 10 mg oral tablet: 1 tab(s) orally once a day  losartan 100 mg oral tablet: 1 tab(s) orally once a day  PT eval, Dx unsteady fait:   Valium 5 mg oral tablet: 1 tab(s) orally once a day, As Needed  vitamin E 1000 intl units oral capsule: 1 cap(s) orally once a day

## 2022-11-25 NOTE — PROGRESS NOTE ADULT - PROBLEM SELECTOR PLAN 6
resume home BP meds
resume home BP meds
-Pt unsure of home medication, will do official med rec in the AM

## 2022-11-25 NOTE — PROGRESS NOTE ADULT - PROBLEM SELECTOR PLAN 5
med rec  resume home BP meds
med rec  resume home BP meds
-Pt unsure of home medication, will do official med rec in the AM

## 2022-11-26 PROBLEM — Z00.00 ENCOUNTER FOR PREVENTIVE HEALTH EXAMINATION: Status: ACTIVE | Noted: 2022-01-01

## 2022-11-26 NOTE — PHYSICAL THERAPY INITIAL EVALUATION ADULT - IMPAIRMENTS FOUND, PT EVAL
aerobic capacity/endurance/ergonomics and body mechanics/gait, locomotion, and balance/gross motor/posture

## 2022-11-26 NOTE — PHYSICAL THERAPY INITIAL EVALUATION ADULT - GAIT DEVIATIONS NOTED, PT EVAL
narrow base of support; excessive bilateral knee flexion through stance phase. Pt experienced ~2-3 episodes of loss of balance requiring minimal assistance to prevent fall./decreased karen/decreased velocity of limb motion/decreased step length/decreased stride length/decreased weight-shifting ability

## 2022-11-26 NOTE — PHYSICAL THERAPY INITIAL EVALUATION ADULT - DIAGNOSIS, PT EVAL
Upon evaluation, pt presents with impairments in balance, gait, and endurance. Pt would benefit from skilled PT services in the acute care setting to address impairments to facilitate return to prior level of function.

## 2022-11-26 NOTE — DISCHARGE NOTE NURSING/CASE MANAGEMENT/SOCIAL WORK - NSDCFUADDAPPT_GEN_ALL_CORE_FT
Please follow up  call in 1 week for appointment   ( you need US thyroid, nuclear scan, needle biopsy )    [facility]  [address] [facility] [place] [facility] [address]  [place] [id number] [phone]    Please follow up outpatient with ENT clinic () - call for appointment within 1-2 weeks but first call for Endocrinology appointment

## 2022-11-26 NOTE — DISCHARGE NOTE NURSING/CASE MANAGEMENT/SOCIAL WORK - NSDCPEFALRISK_GEN_ALL_CORE
For information on Fall & Injury Prevention, visit: [email or web address] OR  [email or web address] OR  https://www.cdc.gov/steadi/patient.html

## 2022-11-26 NOTE — PHYSICAL THERAPY INITIAL EVALUATION ADULT - PATIENT PROFILE REVIEW, REHAB EVAL
PT initial evaluation received and chart review completed. Pt agreeable to participate in PT evaluation. Pt cleared by VENKAT Lynn./yes

## 2022-11-26 NOTE — DISCHARGE NOTE NURSING/CASE MANAGEMENT/SOCIAL WORK - PATIENT PORTAL LINK FT
You can access the FollowMyHealth Patient Portal offered by [facility] by registering at the following website: [email or web address]. By joining [facility]’s FollowMyHealth portal, you will also be able to view your health information using other applications (apps) compatible with our system.

## 2022-11-26 NOTE — PHYSICAL THERAPY INITIAL EVALUATION ADULT - STAIR PATTERN, REHAB EVAL
Patient messaging with the following:    I was trying to get my prescription refilled before I left therapy,  I have 1 pill left I need a refill   
step over step

## 2022-11-26 NOTE — PHYSICAL THERAPY INITIAL EVALUATION ADULT - ADDITIONAL COMMENTS
pt lives in a private house with her son and daughter-in-law; There are 5 steps to enter and ~13 steps to negotiate to bedroom on second floor.

## 2022-11-26 NOTE — PHYSICAL THERAPY INITIAL EVALUATION ADULT - PERTINENT HX OF CURRENT PROBLEM, REHAB EVAL
Pt is a 81 year old female presenting with difficulty swallowing solids and liquids, generalized weakness, unintentional weight loss, and light intolerance and watery eyes. CT chest/neck showed multinodular goiter causing rightward deviation of the trachea, without significant airway narrowing.

## 2022-11-28 PROBLEM — E78.5 HYPERLIPIDEMIA, UNSPECIFIED: Chronic | Status: ACTIVE | Noted: 2022-01-01

## 2022-11-28 PROBLEM — I10 ESSENTIAL (PRIMARY) HYPERTENSION: Chronic | Status: ACTIVE | Noted: 2022-01-01

## 2022-12-03 NOTE — ED PROVIDER NOTE - OBJECTIVE STATEMENT
82 HTN and HLD, dysphagia presents for AMS.   as per daughter patient not eating or drinking. state pt unable to tolerate PO.   pt unresponsive in triage. brought to ED urgently for evaluation.

## 2022-12-03 NOTE — ED PROVIDER NOTE - CLINICAL SUMMARY MEDICAL DECISION MAKING FREE TEXT BOX
82 HTN and HLD, dysphagia presents for AMS.   hypotensive, tachycardic to 150s, dry, AMS, not responding, concern for severe shock (sepsis vs hypovolumic vs hemorrhagic), pending labs and resuscitation

## 2022-12-03 NOTE — ED PROVIDER NOTE - CARE PLAN
1 Principal Discharge DX:	Acute colitis  Secondary Diagnosis:	Altered mental status  Secondary Diagnosis:	2019 novel coronavirus disease (COVID-19)

## 2022-12-03 NOTE — ED PROVIDER NOTE - PROGRESS NOTE DETAILS
Domenica Drew PGY-3  spoke with son and daughter, patient to be DNR DNI at this time. will continue to resuscitate  given elevated HR, bleed in rectum on exam, concern for shock (hemorrhagic vs sepsis vs hypovolumic) will start fluids, emergent blood, abx Domenica Drew PGY-3 HR 100s, following commands, non verbal. started on levophed for septic shock. MICU aware and examined patient  MOLST filled out, DNR DNI at this time. amenable to pressors Domenica Drew PGY-3 patient gotten 1.9L of fluid while in ED and 1u PRBC emergent over 1 hr.   MAP 77 /63, HR 100s. spoke with MICU recommending 500cc fluid bolus and reassessment  on levophed 0.1 currently. lactate downtrending, mental status has improved. Gerry Gant, [id number] received at signout pending evaluation by MICU.  Patient able to come off pressors.  CT concerning for colitis infectious versus ischemic.  Patient already covered with broad-spectrum antibiotics.  MAP has been above 65 for the past 1.5 hours.  Patient to be admitted to medicine on telemetry for further treatment DNR signed in chart by day team.  Patient also found to be COVID-positive.

## 2022-12-03 NOTE — CONSULT NOTE ADULT - ASSESSMENT
80 yo F w/Hx of HTN, HLD, hyperthyroidism (untreated) recent hospitalization 11/22-11/26 for dyaphagia and weakness found to have multinodular goiter presents with generalized weakness likely 2/2 poor po intake, found to be hypotensive requiring pressors, concern for hypovolemic shock. MICU consulted.     #Hypotension  - suspect hypovolemic shock secondary to poor PO intake, very limited PO intake per family   - lactate 16 --> 11 s/p 1.5L IVF and 1u pRBCs in ED  - recommend give additional 500cc bolus and trend vbg  - currently on levophed 0.1, wean pressors as tolerated, improving with fluids  - recommend midodrine if patient tolerating PO    #Hypernatremia   - admitted with Na 159, Na 141 one week ago, likely acute with sudden change  - free water deficit 2.7L  - s/p 1.5L IVF with additional 500cc bolus given now  - trend BMP [id number], careful correction no more than 12 mEq over first 24 hours  - can obtain CT head if mental status does not improve with fluids    #GOC  - currently DNR/DNI, MOLST filled out in ED  - Recommend palliative care consult to discuss further goals of care, treatment vs symptom-management    The patient does not have any MICU needs at this time. Please re-consult MICU as needed.     Kimo Olson MD  Internal Medicine, PGY-2

## 2022-12-03 NOTE — CONSULT NOTE ADULT - SUBJECTIVE AND OBJECTIVE BOX
CHIEF COMPLAINT: Lethargy and decreased PO intake    HPI:  80 yo F w/Hx of HTN, HLD, hyperthyroidism (untreated) recent hospitalization - for dyaphagia and weakness found to have multinodular goiter presents with generalized weakness likely 2/2 poor po intake. Family at bedside. They reported after patient left the hospital she was eating and drinking very little. She had been discharged on a pureed diet. She is normally alert and conversive (AOx3), walks independently. Last few days, the patient was more lethargic and walking around less. Came to the hospital because family was worried about mental status and poor PO intake. No recent changes in medications. Had 1x episode of diarrhea prior to hospitalizaiton. Denies nausea or vomiting. Denies fever, chills, cough, or leg swelling. The patient is currently AOx2, reports feeling weak. Denies pain.     In ED, vitals febrile 101.4, HR 120s, /77, placed on HFNC at 50/50. Labs significant for Na 159, BUN 52, corrected Ca 11.7, and SCr 1.47. No pertinent imaging. Received 1u pRBCs due to concern for GI bleed (blood on thermometer when taking rectal temp). Received tylenol, cefepime, vancomycin, and 1.5L NS in the ED. Was persistently hypotensive so started on levophed.       PAST MEDICAL & SURGICAL HISTORY:  HTN (hypertension)      HLD (hyperlipidemia)      No significant past surgical history      FAMILY HISTORY:  No pertinent family history in first degree relatives      SOCIAL HISTORY:  Previous smoker, no alcohol or recreational drug use.     Allergies    fish (Other)  penicillin (Hives)    Intolerances        HOME MEDICATIONS:    REVIEW OF SYSTEMS:  CONSTITUTIONAL: No weakness, fevers or chills  EYES/ENT: No visual changes;  No vertigo or throat pain   NECK: No pain or stiffness  RESPIRATORY: No cough, wheezing, hemoptysis; No shortness of breath  CARDIOVASCULAR: No chest pain or palpitations  GASTROINTESTINAL: No abdominal or epigastric pain. No nausea, vomiting, or hematemesis; No diarrhea or constipation. No melena or hematochezia.  GENITOURINARY: No dysuria, frequency or hematuria  NEUROLOGICAL: No numbness or weakness  SKIN: No itching, rashes      OBJECTIVE:  ICU Vital Signs Last 24 Hrs  T(C): 36.1 (03 Dec 2022 16:49), Max: 38.6 (03 Dec 2022 13:57)  T(F): 96.9 (03 Dec 2022 16:49), Max: 101.4 (03 Dec 2022 13:57)  HR: 97 (03 Dec 2022 17:30) (94 - 126)  BP: 98/68 (03 Dec 2022 17:30) (98/68 - 152/77)  BP(mean): 78 (03 Dec 2022 17:30) (76 - 94)  ABP: --  ABP(mean): --  RR: 22 (03 Dec 2022 17:30) (20 - 30)  SpO2: 94% (03 Dec 2022 17:30) (89% - 100%)    O2 Parameters below as of 03 Dec 2022 17:30  Patient On (Oxygen Delivery Method): nasal cannula, high flow    O2 Concentration (%): 0.5         @ 07:01  -  12-03 @ 17:56  --------------------------------------------------------  IN: 0 mL / OUT: 50 mL / NET: -50 mL      CAPILLARY BLOOD GLUCOSE      POCT Blood Glucose.: 102 mg/dL (03 Dec 2022 13:38)      PHYSICAL EXAM:  GENERAL: lethargic, cachectic  HEAD: Atraumatic, Normocephalic  EYES: EOMI, PERRLA, conjunctiva and sclera clear  ENT: Dry mucous membranes  NECK: Supple, No JVD  CHEST/LUNG: Clear to auscultation bilaterally; on HFNC 50/50  HEART: tachycardic  ABDOMEN: Bowel sounds present; Soft, Nontender, Nondistended.   EXTREMITIES: 2+ Peripheral Pulses, brisk capillary refill. No clubbing, cyanosis, or edema  NERVOUS SYSTEM: Alert & Oriented X2, lethargic, follows commands  MSK: FROM all 4 extremities, full and equal strength  SKIN: No rashes or lesions      HOSPITAL MEDICATIONS:  MEDICATIONS  (STANDING):  midodrine 10 milliGRAM(s) Oral once  norepinephrine Infusion 0.05 MICROgram(s)/kG/Min (3.75 mL/Hr) IV Continuous <Continuous>  potassium chloride  10 mEq/100 mL IVPB 10 milliEquivalent(s) IV Intermittent every 1 hour  sodium chloride 0.9% Bolus 500 milliLiter(s) IV Bolus once    MEDICATIONS  (PRN):      LABS:                        16.6   8.00  )-----------( 131      ( 03 Dec 2022 16:39 )             53.6     12    158<H>  |  115<H>  |  51<H>  ----------------------------<  252<H>  3.3<L>   |  19<L>  |  1.35<H>    Ca    9.3      03 Dec 2022 16:39  Mg     2.30     12    TPro  5.4<L>  /  Alb  2.3<L>  /  TBili  1.5<H>  /  DBili  x   /  AST  444<H>  /  ALT  174<H>  /  AlkPhos  105  12    PT/INR - ( 03 Dec 2022 14:14 )   PT: 15.1 sec;   INR: 1.30 ratio         PTT - ( 03 Dec 2022 14:14 )  PTT:31.2 sec  Urinalysis Basic - ( 03 Dec 2022 14:26 )    Color: Yellow / Appearance: Slightly Turbid / S.022 / pH: x  Gluc: x / Ketone: Small  / Bili: Small / Urobili: <2 mg/dL   Blood: x / Protein: 30 mg/dL / Nitrite: Negative   Leuk Esterase: Negative / RBC: 10 /HPF / WBC 14 /HPF   Sq Epi: x / Non Sq Epi: 12 /HPF / Bacteria: Negative        Venous Blood Gas:   @ 16:39  7.31/47/40/24/63.5  VBG Lactate: 11.1  Venous Blood Gas:   @ 14:49  7.22/53/36/22/48.2  VBG Lactate: 15.5  Venous Blood Gas:   @ 14:14  7.09/71/22/22/16.8  VBG Lactate: 16.5      MICROBIOLOGY:     RADIOLOGY:  [ ] Reviewed and interpreted by me    EKG:

## 2022-12-03 NOTE — ED ADULT NURSE REASSESSMENT NOTE - NS ED NURSE REASSESS COMMENT FT1
Pt AOX4, pallor greatly improved; has had 2 episodes of melena, pt care provided, 2nd episode more formed stool. Pt speech is clearer and is more responsive. Awaiting CT.
Pt afebrile orally; placed on Airborne isolation; speech and work of breathing have improved  has gotten stronger on Hi-Flow NC; labs drawn and sent; family at bedside.
Pt resting, repositioned for comfort; high flow O2 placed via NC by Respiratory Therapist, pt tolerating well. MICU consult in progress.
Pt returned from CT scan, Levo stopped; pt tolerating well; BP holding steady in 90s systolic. Family at bedside. Pt responding well, states she is tired.
Patient A04 in the stretcher. Patient able to have a conversation with me. Patient tolerating high flow NC. Side rails up. Call bell within reach. Safety precautions maintained. COVID precautions maintained.

## 2022-12-03 NOTE — ED PROVIDER NOTE - ATTENDING CONTRIBUTION TO CARE
80 yo F hx HTN, HLD, hyperthyroidism, recently discharged from hospital a few weeks ago 2/2 hypernatremia/generalized weakness and noted to have multinodular goiter with +TFTs (not on meds), brought in by family today for decreased PO intake, lethargy/AMS from baseline. On exam, patient lethargic, not following commands, dry appearing, with respiratory distress, tachycardic to 150s, noted to have bloody stool on rectal probe for temperature. Treated with one unit pRBC for possible hemorraghic shock, IVF for possible septic shock given fever. GOC discussed with family- DNR/DNI. Patient mental status improved s/p fluids, placed on levophed for bp support, abx for sepsis, micu consulted, tba

## 2022-12-03 NOTE — ED ADULT NURSE NOTE - NSIMPLEMENTINTERV_GEN_ALL_ED
Implemented All Fall with Harm Risk Interventions:  [place] to call system. Call bell, personal items and telephone within reach. Instruct patient to call for assistance. Room bathroom lighting operational. Non-slip footwear when patient is off stretcher. Physically safe environment: no spills, clutter or unnecessary equipment. Stretcher in lowest position, wheels locked, appropriate side rails in place. Provide visual cue, wrist band, yellow gown, etc. Monitor gait and stability. Monitor for mental status changes and reorient to person, place, and time. Review medications for side effects contributing to fall risk. Reinforce activity limits and safety measures with patient and family. Provide visual clues: red socks.

## 2022-12-03 NOTE — ED PROVIDER NOTE - PHYSICAL EXAMINATION
Vital signs reviewed  GENERAL: eyes open, not following commands, not moving extremities   HEAD: NCAT  EYES: Anicteric  ENT: dry mm   NECK: Supple, non tender  RESPIRATORY: agonal breathing   CARDIOVASCULAR: tachycardic   ABDOMEN: Soft. Nondistended. blood in rectum   MUSCULOSKELETAL/EXTREMITIES: cap refill >5, cool extremities   SKIN:  skin molting   NEURO: AAOx0

## 2022-12-03 NOTE — ED ADULT NURSE NOTE - OBJECTIVE STATEMENT
satish rn: pt received to TR C, obtunded with agonal breathing. daughter arrived to ED with pt in back seat of car. as per daughter with lives with her Son, and was last seen normal 3 days ago. in the last 3 days pt has not been eating regularly or ambulating around apartment. pt non-responsive to painful stimuli. blood noted in rectum during rectal temp with redness around the rectum. skin otherwise w/d/i. on HM, Sinus tach noted. s/l x 2 placed, blood work, urine and covid swab obtained and sent to lab. Emergent blood infusing as per MDs orders without incident. pt placed on 100% NRB. family at bedside. pending further attending eval.

## 2022-12-04 NOTE — CHART NOTE - NSCHARTNOTEFT_GEN_A_CORE
Patient is an 81 y/o female with multinodular goiter previously evaluated by our team for subclinical hyperthyroidism who is admitted with COVID and colitis. Is septic. Endocrine called to guidance as patient received CT imaging with contrast.    The patient had a subclinical hyperthyroid pattern last admission (low normal FT4, low TT3 with TSH <0.01). Graves Abs negative. Repeat TFTs this time shows low TT3 and TSH 0.15. HR is normal, patient not comatose or agitated. Not on methimazole. Not on beta blockade. Just started dexamethasone 6mg x10 days for COVID. Her current TFTs shows more of a non-thyroidal illness pattern. The primary team is ordering a FT4 and repeating TSH. If the patient develops any signs/symptoms of thyrotoxicosis (tachycardia, heat intolerance, agitation, delirium, lethargy, etc.), the team will let us know. Formal consult deferred for now.     Discussed with primary team ACP via phone and emailed resident with our recommendation.    Luis A Brady DO, Endocrinology Fellow  For follow-up questions, discharge recommendations, or new consults please call answering service at [phone] (weekdays), [phone] (nights/weekends). For nonurgent matters, please email [email or web address] or [email or web address].

## 2022-12-04 NOTE — H&P ADULT - PROBLEM SELECTOR PLAN 4
Corrected Ca 11.7-> 10.7  - likely 2/2 to poor PO intake  - S/p 2L IVF in ED  - start LR @ 75cc/hr  - encourage PO Corrected Ca 11.7-> 10.7  - likely 2/2 to poor PO intake  - S/p 2L IVF in ED  - 1L LR and start LR @ 75cc/hr  - encourage PO

## 2022-12-04 NOTE — H&P ADULT - PROBLEM SELECTOR PLAN 7
Trop 87 -> 99  - pt denied chest pain  - EKG non-ischemic   - likely i/s/o demand Trop 87 -> 99  - pt denied chest pain  - EKG non-ischemic   - likely i/s/o demand  - repeat trop with AM labs

## 2022-12-04 NOTE — CONSULT NOTE ADULT - ASSESSMENT
82F with PMH HTN, HLD, hyperthyroidism (untreated), recent hospitalization 11/22-11/26 for dysphagia and weakness found to have multinodular goiter presents with generalized weakness and poor PO intake. CTA demonstrating colitis of descending and sigmoid colon.     -No acute surgical intervention  -continue resuscitation  -serial abdominal exams  -will follow    Discussed w/ Dr. Birgit OSORIO Surgery

## 2022-12-04 NOTE — H&P ADULT - PROBLEM SELECTOR PLAN 6
Blood seen on thermometer used for rectal temperature per MICU note  - Hgb 14.1 on admission  - s/p 1u pRBC  - dark brown stool seen on exam  - trend CBC and monitor for signs of bleeding - CT angio abd: Nonspecific enterocolitis with wall thickening of the descending and sigmoid segments of the colon. An infectious etiology is favored. Bowel ischemia remains on the differential diagnosis. However, there is no evidence of mesenteric artery occlusion or mesenteric vein thrombosis. No pneumatosis or portal venous gas  - surgery c/s for possible bowel ischemia     #?GI bleed  - Blood seen on thermometer used for rectal temperature per MICU note  - Hgb 14.1 on admission  - s/p 1u pRBC  - dark brown stool seen on exam  - trend CBC and monitor for signs of bleeding - CT angio abd: Nonspecific enterocolitis with wall thickening of the descending and sigmoid segments of the colon. An infectious etiology is favored. Bowel ischemia remains on the differential diagnosis. However, there is no evidence of mesenteric artery occlusion or mesenteric vein thrombosis. No pneumatosis or portal venous gas  - surgery c/s for possible bowel ischemia, f/u recs    #?GI bleed  - Blood seen on thermometer used for rectal temperature per MICU note  - Hgb 14.1 on admission  - s/p 1u pRBC  - dark brown stool seen on exam  - trend CBC and monitor for signs of bleeding

## 2022-12-04 NOTE — H&P ADULT - NSHPSOCIALHISTORY_GEN_ALL_CORE
50 pack year history (quit 4 years ago). Denied ETOH and illicit drug use.   Lives with son and daughter-in-law.

## 2022-12-04 NOTE — H&P ADULT - PROBLEM SELECTOR PLAN 1
T 101.4, HR 120s. GI and PNA possible sources  - s/p vanc and cefepime in ED  - CT angio abd showed nonspecific enterocolitis with wall thickening of the descending and sigmoid segments of the colon and bilateral dependent pneumonia in the lower lobes  - COVID positive, refer to COVID section for plan  - send GI PCR, stool culture, and stool ova and parasites   - vanc (12/3- ), cefepime (12/3- ), flagyl (12/4- )  - f/u urine and blood cultures and MRSA PCR T 101.4, HR 120s. GI and PNA possible sources  - s/p vanc and cefepime in ED  - CT angio abd showed nonspecific enterocolitis with wall thickening of the descending and sigmoid segments of the colon and bilateral dependent pneumonia in the lower lobes  - COVID positive, refer to COVID section for plan  - send GI PCR, stool culture, and stool ova and parasites   - vanc (12/3- ), cefepime (12/3- ), azithro (12/4- )  - f/u urine and blood cultures and MRSA PCR T 101.4, HR 120s. GI and PNA possible sources  - s/p vanc and cefepime in ED  - CT angio abd showed nonspecific enterocolitis with wall thickening of the descending and sigmoid segments of the colon and bilateral dependent pneumonia in the lower lobes  - COVID positive, refer to COVID section for plan  - send GI PCR, stool culture, and stool ova and parasites   - vanc dosed by level (12/3- ), cefepime (12/3- ), azithro (12/4- )  - f/u urine and blood cultures, MRSA PCR, urine legionella

## 2022-12-04 NOTE — CHART NOTE - NSCHARTNOTEFT_GEN_A_CORE
RN notified medicine team pt O2 saturation sustaining low 90s       Chart reviewed, pt assessed at bedside, NAD, AOx4 lethargic. Pt offers no complaints. Pt denies fevers, chills, chest pain, palpitations, shortness of breath, cough, wheeze, abdominal pain, nausea, vomiting, diarrhea, constipation, dysuria. O2 saturation on  sustaining 90-92% w/ good pleth. Poor air entry in all fields on auscultation no wheezing rales or rhonchi auscultated no increased WOB or accessory muscle use.     Vital Signs Last 24 Hrs  T(C): 36.4 (04 Dec 2022 21:01), Max: 36.4 (04 Dec 2022 11:32)  T(F): 97.6 (04 Dec 2022 21:01), Max: 97.6 (04 Dec 2022 21:01)  HR: 71 (04 Dec 2022 21:01) (71 - 87)  BP: 96/77 (04 Dec 2022 21:01) (83/47 - 141/65)  BP(mean): --  RR: 20 (04 Dec 2022 21:01) (18 - 24)  SpO2: 93% (04 Dec 2022 21:01) (87% - 97%)    Parameters below as of 04 Dec 2022 21:01  Patient On (Oxygen Delivery Method): nasal cannula, high flow  O2 Flow (L/min): 60  O2 Concentration (%): 70    ASSESSMENT:  -Pt has evidence of COPD on DI studies admitted w/ COVID assessed for low O2 saturation vital signs otherwise stable. Asymptomatic, mild improvement in mental status (AOx2 on admission AOx4 on assessment however pt still lethargic) no evidence of respiratory distress, poor air entry in all fields on auscultation. O2 saturation improved to 95% w/ good pleth (observed on /telemtery monitor) s/p albuterol 90 mcg HFA inhaler x1.     PLAN:  -VBG ordered as add on  -Further w/u if pt clinical status declines further   -Will continue to monitor     Paulo Simeon PA-C  Department of Internal Medicine  In-House beeper number [id number]

## 2022-12-04 NOTE — H&P ADULT - ASSESSMENT
82F with PMH HTN, HLD, hyperthyroidism (untreated), recent hospitalization 11/22-11/26 for dysphagia and weakness found to have multinodular goiter presents with generalized weakness likely 2/2 poor PO intake found to be septic and s/p short course of pressors for likely hypovolemic shock.

## 2022-12-04 NOTE — CONSULT NOTE ADULT - SUBJECTIVE AND OBJECTIVE BOX
[facility] DIVISION OF INFECTIOUS DISEASES  YJOTI Dawson S. Shah, Y. Patel, G. [facility]  [phone]  ([phone] - weekdays after 5pm and weekends)    TIFFANY HU  82y, Female  [id number]    HPI:  Patient is a 82 year old female with PMH HTN, HLD, hyperthyroidism (untreated), recent hospitalization - for dysphagia and weakness found to have multinodular goiter presents with generalized weakness and poor PO intake. Family reported after patient left the hospital she was eating and drinking very little. Over the last week pt has become more lethargic and walking around less. Prior to previous hospitalization pt was independent and working. Family brought pt to hospital because they were worried about mental status and poor PO intake. No recent changes in medications. Had 1x episode of diarrhea prior to hospitalization. Pt endorsed SOB. Denied chest pain, n/v, fever, chills, cough.    ED VS: febrile 101.4, HR 120s, /77, placed on HFNC at 50/50. Labs significant for Na 159, BUN 52, corrected Ca 11.7, and SCr 1.47. CT head negative. CT angio abd showed nonspecific enterocolitis with wall thickening of the descending and sigmoid segments of the colon and bilateral dependent pneumonia in the lower lobes. Received 1u pRBCs due to concern for GI bleed (blood on thermometer when taking rectal temp). Received tylenol, cefepime, vancomycin. Pt became persistently hypotensive to SBP 80s and started on levophed which was weaned off after 2L IVF.  (04 Dec 2022 02:17) Patient seen and examined at bedside this morning in the ER. Patient on HFNC, she is oriented x3 but unable to provide full history. She thinks her symptoms started a week ago but unsure. Patient currently states her breathing is ok, she denies cough or chest pain.  ROS: 14 point review of systems completed, pertinent positives and negatives as per HPI.    Allergies: fish (Other), penicillin (Hives)  PMH -- HTN (hypertension), HLD (hyperlipidemia)  PSH -- No significant past surgical history  FH -- No pertinent family history in first degree relatives    Social History -- denies tobacco, alcohol or illicit drug use    Physical Exam--  Vital Signs Last 24 Hrs  T(F): 97.5 (04 Dec 2022 11:32), Max: 97.5 (04 Dec 2022 11:32)  HR: 80 (04 Dec 2022 13:38) (71 - 116)  BP: 110/55 (04 Dec 2022 11:32) (88/56 - 141/65)  RR: 22 (04 Dec 2022 13:39) (17 - 30)  SpO2: 92% (04 Dec 2022 13:39) (87% - 100%)  General: no acute distress, HFNC  HEENT: NC/AT, EOMI, anicteric, neck supple  Lungs: decreased breath sounds b/l  Heart: S1, S2 present, RRR. No murmur, rub or gallop.  Abdomen: Soft. Nondistended. Nontender. BS present.   Neuro: AAOx3, forgetful, no obvious focal deficits   Extremities: No cyanosis or clubbing. No edema.   Skin: Warm. Dry. Good turgor. No visible rash.   Psychiatric: Appropriate affect and mood for situation.   Lines: PIV  Plaza: present with pink tinged urine     Laboratory & Imaging Data--  CBC:                       16.1   10.96 )-----------( 145      ( 04 Dec 2022 07:05 )             51.4     WBC Count: 10.96 K/uL (22 @ 07:05)  WBC Count: 8.00 K/uL (22 @ 16:39)  WBC Count: 11.35 K/uL (22 @ 15:07)    CMP: 12-    153<H>  |  119<H>  |  56<H>  ----------------------------<  194<H>  5.2   |  23  |  0.99    Ca    8.4      04 Dec 2022 12:56  Phos  2.8     12-04  Mg     2.10     12-04    TPro  4.9<L>  /  Alb  2.2<L>  /  TBili  1.1  /  DBili  x   /  AST  306<H>  /  ALT  145<H>  /  AlkPhos  96  12-04    LIVER FUNCTIONS - ( 04 Dec 2022 07:05 )  Alb: 2.2 g/dL / Pro: 4.9 g/dL / ALK PHOS: 96 U/L / ALT: 145 U/L / AST: 306 U/L / GGT: x           Auto Neutrophil #: 10.67 K/uL (22 @ 15:07)  Auto Lymphocyte #: 0.23 K/uL (22 @ 15:07)    D-Dimer Assay, Quantitative: 2105 ng/mL DDU (22 @ 07:05)    Lactate, Blood: 2.8 mmol/L (22 @ 11:20)    Prothrombin Time, Plasma: 15.1 sec (22 @ 14:14)    Activated Partial Thromboplastin Time: 31.2 sec (22 @ 14:14)    Urinalysis Basic - ( 03 Dec 2022 14:26 )  Color: Yellow / Appearance: Slightly Turbid / S.022 / pH: x  Gluc: x / Ketone: Small  / Bili: Small / Urobili: <2 mg/dL   Blood: x / Protein: 30 mg/dL / Nitrite: Negative   Leuk Esterase: Negative / RBC: 10 /HPF / WBC 14 /HPF   Sq Epi: x / Non Sq Epi: 12 /HPF / Bacteria: Negative    Microbiology: reviewed   Respiratory Viral Panel with COVID-19 by [facility] (22 @ 14:20)    Rapid RVP Result: Detected    SARS-CoV-2: Detected: This Respiratory Panel uses polymerase chain reaction (PCR) to detect for  adenovirus; coronavirus (HKU1, NL63, 229E, OC43); human metapneumovirus  (hMPV); human enterovirus/rhinovirus (Entero/RV); influenza A; influenza  A/H1; influenza A/H3; influenza A/H1-2009; influenza B; parainfluenza  viruses 1, 2, 3, 4; respiratory syncytial virus; Mycoplasma pneumoniae;  Chlamydophila pneumoniae; and SARS-CoV-2.    Adenovirus (RapRVP): NotDetec    Influenza A (RapRVP): NotDetec    Influenza B (RapRVP): NotDetec    Parainfluenza 1 (RapRVP): NotDetec    Parainfluenza 2 (RapRVP): NotDetec    Parainfluenza 3 (RapRVP): NotDetec    Parainfluenza 4 (RapRVP): NotDetec    Resp Syncytial Virus (RapRVP): NotDetec    Bordetella pertussis (RapRVP): NotDetec    Bordetella parapertussis (RapRVP): NotDetec    Chlamydia pneumoniae (RapRVP): NotDetec    Mycoplasma pneumoniae (RapRVP): NotDetec    Entero/Rhinovirus (RapRVP): NotDetec    HKU1 Coronavirus (RapRVP): NotDetec    NL63 Coronavirus (RapRVP): NotDetec    229E Coronavirus (RapRVP): NotDetec    OC43 Coronavirus (RapRVP): NotDetec    hMPV (RapRVP): NotDetec    Radiology--reviewed  CT Head No Cont (22 @ 19:30) >IMPRESSION: No acute intracranial hemorrhage or mass effect. Age-related involutional changes and chronic small vessel ischemic disease.    CT Angio Abdomen and Pelvis w/ IV Cont (22 @ 19:30) >IMPRESSION: Colitis of the descending and sigmoid colon, possibly ischemic colitis, infection, or inflammatory bowel disease. Bibasilar atelectasis or pneumonia.  Thickened endometrium, measuring 1.2cm. Indeterminate right adrenal nodule, measuring 2.2 cm.    Xray Chest 1 View-PORTABLE IMMEDIATE (22 @ 15:44) >Impression: Chronic lung changes. No focal infiltrate.    Active Medications--  azithromycin  IVPB      cefepime   IVPB 1000 milliGRAM(s) IV Intermittent every 24 hours  dexAMETHasone  Injectable 6 milliGRAM(s) IV Push daily  dextrose 5%. 1000 milliLiter(s) IV Continuous <Continuous>  heparin   Injectable 5000 Unit(s) SubCutaneous every 8 hours  metroNIDAZOLE  IVPB 500 milliGRAM(s) IV Intermittent every 8 hours  remdesivir  IVPB   IV Intermittent     Current Antimicrobials:   azithromycin  IVPB      cefepime   IVPB 1000 milliGRAM(s) IV Intermittent every 24 hours  metroNIDAZOLE  IVPB 500 milliGRAM(s) IV Intermittent every 8 hours  remdesivir  IVPB   IV Intermittent     Prior/Completed Antimicrobials:  azithromycin  IVPB  cefepime   IVPB  vancomycin  IVPB.    Immunologic:

## 2022-12-04 NOTE — H&P ADULT - ATTENDING COMMENTS
81 yo f dnr/i with septic shock briefly on pressors in setting of suspected PNA and colitis. COVID + on HFNC,  Hypernatremia. C/w broad spec abx , IVF as tolerated, npo pending surgical eval given ischemic bowel remains part of dd; follow official CT abd. Bedside and formal  swallow eval given possible aspiration. Decadron, D-dimer ordered to determine appropriate AC given covid +; remdesevir of renal function stable.

## 2022-12-04 NOTE — CONSULT NOTE ADULT - ASSESSMENT
Patient is a 82 year old female with PMH HTN, HLD, hyperthyroidism (untreated), recent hospitalization 11/22-11/26 for dysphagia and weakness found to have multinodular goiter presents with generalized weakness, worsening lethargy and poor PO intake. She had 1x episode of diarrhea prior to hospitalization. Patient endorsed dyspnea, denied chest pain, n/v, fever, chills, cough.    She was noted with fever to 101.4F in the ER, hypoxic and placed on HFNC; labs with hyponatremia, Cr 1.47. CT head negative.   CTA A/P with colitis of descending and sigmoid colon - ischemic vs infectious vs inflammatory bowel disease; bibasilar atelectasis vs pneumonia   Received 1u pRBCs due to concern for GI bleed (blood on thermometer when taking rectal temp).   Pt became persistently hypotensive to SBP 80s and started on levophed which was weaned off after 2L IVF.  S/p cefepime, vancomycin    Septic shock,- hypotension on pressor briefly, fever   AHRF due to COVID-19 pneumonia  Colitis - possible ischemic vs infectious vs IBD  - afebrile now, on HFNC, BP improved  - COVID vaccinated x3; sx onset unclear  - start on remdesivir x3d course   - continue on dexamethasone x10d course  - check ferritin and PCT for FPR  - GI following, no plan for colonoscopy at this time   - surgical eval for c/f ischemic bowel, no intervention, serial abd exams  - continue cefepime and add metronidazole for possible infectious colitis   - if any diarrhea, send culture, GI stool PCR, C.diff  - isolation per infection control protocol  - wean supplemental O2 as tolerated   - monitor clinically, temps, CBC      D/w Dr. Enrico Quinones M.D.  [facility], Division of Infectious Diseases  [phone]  After 5pm on weekdays and all day on weekends - please call [phone] Patient is a 82 year old female with PMH HTN, HLD, hyperthyroidism (untreated), recent hospitalization 11/22-11/26 for dysphagia and weakness found to have multinodular goiter presents with generalized weakness, worsening lethargy and poor PO intake. She had 1x episode of diarrhea prior to hospitalization. Patient endorsed dyspnea, denied chest pain, n/v, fever, chills, cough.    She was noted with fever to 101.4F in the ER, hypoxic and placed on HFNC; labs with hyponatremia, Cr 1.47. CT head negative.   CTA A/P with colitis of descending and sigmoid colon - ischemic vs infectious vs inflammatory bowel disease; bibasilar atelectasis vs pneumonia   Received 1u pRBCs due to concern for GI bleed (blood on thermometer when taking rectal temp).   Pt became persistently hypotensive to SBP 80s and started on levophed which was weaned off after 2L IVF.  S/p cefepime, vancomycin    Septic shock,- hypotension on pressor briefly, fever   AHRF due to COVID-19 pneumonia  Colitis - possible ischemic vs infectious vs IBD  - afebrile now, on HFNC, BP improved  - COVID vaccinated x3; sx onset unclear  - start on remdesivir x3d course   - continue on dexamethasone x10d course  - check ferritin and PCT for FPR  - GI following, no plan for colonoscopy at this time   - surgical eval for c/f ischemic bowel, no intervention, serial abd exams  - continue cefepime and add metronidazole for possible infectious colitis   - discontinue azithromycin  - if any diarrhea, send culture, GI stool PCR, C.diff  - isolation per infection control protocol  - wean supplemental O2 as tolerated   - monitor clinically, temps, CBC      D/w Dr. Enrico Quinones M.D.  [facility], Division of Infectious Diseases  [phone]  After 5pm on weekdays and all day on weekends - please call [phone]

## 2022-12-04 NOTE — H&P ADULT - NSHPLABSRESULTS_GEN_ALL_CORE
LABS:                           16.6   8.00  )-----------( 131      ( 03 Dec 2022 16:39 )             53.6         158<H>  |  115<H>  |  51<H>  ----------------------------<  252<H>  3.3<L>   |  19<L>  |  1.35<H>    Ca    9.3      03 Dec 2022 16:39  Mg     2.30         TPro  5.4<L>  /  Alb  2.3<L>  /  TBili  1.5<H>  /  DBili  x   /  AST  444<H>  /  ALT  174<H>  /  AlkPhos  105          PT/INR - ( 03 Dec 2022 14:14 )   PT: 15.1 sec;   INR: 1.30 ratio         PTT - ( 03 Dec 2022 14:14 )  PTT:31.2 sec      Urinalysis Basic - ( 03 Dec 2022 14:26 )    Color: Yellow / Appearance: Slightly Turbid / S.022 / pH: x  Gluc: x / Ketone: Small  / Bili: Small / Urobili: <2 mg/dL   Blood: x / Protein: 30 mg/dL / Nitrite: Negative   Leuk Esterase: Negative / RBC: 10 /HPF / WBC 14 /HPF   Sq Epi: x / Non Sq Epi: 12 /HPF / Bacteria: Negative        LIVER FUNCTIONS - ( 03 Dec 2022 16:39 )  Alb: 2.3 g/dL / Pro: 5.4 g/dL / ALK PHOS: 105 U/L / ALT: 174 U/L / AST: 444 U/L / GGT: x                 I&O's Summary    03 Dec 2022 07:01  -  04 Dec 2022 02:30  --------------------------------------------------------  IN: 0 mL / OUT: 50 mL / NET: -50 mL           / Troponin T, High Sensitivity Result: 99 ng/L (22 @ 16:39)  Troponin T, High Sensitivity Result: 87 ng/L (12-03-22 @ 14:14)      CAPILLARY BLOOD GLUCOSE      POCT Blood Glucose.: 102 mg/dL (03 Dec 2022 13:38)            MICRO:

## 2022-12-04 NOTE — CONSULT NOTE ADULT - SUBJECTIVE AND OBJECTIVE BOX
HPI:  TIFFANY HU is a 82 year old female with history of HTN, HLD, hyperthyroidism, goiter, and recent hospitalization 11/22-11/26 for dysphagia and weakness who presents back to [facility] with generalized weakness, lethargy, and poor PO intake.     Unable to obtain full HPI due to underlying mental status.  She is currently on HFNC and falls asleep in between by questions.  She reportedly presents to [facility] with lethargy, dyspnea, and generalized weakness, found to have fever, acute hypoxic respiratory failure, hypotension requiring Levophed for ~12 hours in ED, and COVID positive.  She reportedly had an episode of diarrhea prior to arrival.    ROS:   Unable to obtain full ROS due to underlying mental status.    PMHX/PSHX:    HTN (hypertension)    HLD (hyperlipidemia)    No significant past surgical history      Allergies:  fish (Other)  penicillin (Hives)      Home Medications: reviewed  Hospital Medications:  azithromycin  IVPB      cefepime   IVPB 1000 milliGRAM(s) IV Intermittent every 24 hours  dexAMETHasone  Injectable 6 milliGRAM(s) IV Push daily  dextrose 5%. 1000 milliLiter(s) IV Continuous <Continuous>  heparin   Injectable 5000 Unit(s) SubCutaneous every 8 hours      Social History:   Unable to obtain due to underlying mental status.    Family history:    No pertinent family history in first degree relatives        PHYSICAL EXAM:   Vital Signs:  Vital Signs Last 24 Hrs  T(C): 36.4 (04 Dec 2022 11:32), Max: 38.6 (03 Dec 2022 13:57)  T(F): 97.5 (04 Dec 2022 11:32), Max: 101.4 (03 Dec 2022 13:57)  HR: 87 (04 Dec 2022 11:35) (71 - 126)  BP: 110/55 (04 Dec 2022 11:32) (88/56 - 152/77)  BP(mean): 81 (03 Dec 2022 18:34) (76 - 94)  RR: 22 (04 Dec 2022 11:35) (17 - 30)  SpO2: 90% (04 Dec 2022 11:35) (89% - 100%)    Parameters below as of 04 Dec 2022 11:35  Patient On (Oxygen Delivery Method): nasal cannula, high flow  O2 Flow (L/min): 50  O2 Concentration (%): 70  Daily Height in cm: 160.02 (03 Dec 2022 13:55)    Daily     GENERAL: no acute distress  NEURO: not fully alert/oriented  HEENT: NCAT, no conjunctival pallor appreciated  CHEST: no respiratory distress, no accessory muscle use  CARDIAC: regular rate, +S1/S2  ABDOMEN: soft, left sided tenderness to palpation, no rebound or guarding  EXTREMITIES: warm, well perfused  SKIN: no lesions noted    LABS: reviewed                        16.1   10.96 )-----------( 145      ( 04 Dec 2022 07:05 )             51.4     12-04    158<H>  |  121<H>  |  57<H>  ----------------------------<  137<H>  3.4<L>   |  22  |  1.27    Ca    8.9      04 Dec 2022 07:05  Phos  3.1     12-04  Mg     2.10     12-04    TPro  4.9<L>  /  Alb  2.2<L>  /  TBili  1.1  /  DBili  x   /  AST  306<H>  /  ALT  145<H>  /  AlkPhos  96  12-04    LIVER FUNCTIONS - ( 04 Dec 2022 07:05 )  Alb: 2.2 g/dL / Pro: 4.9 g/dL / ALK PHOS: 96 U/L / ALT: 145 U/L / AST: 306 U/L / GGT: x               Diagnostic Studies: see sunrise for full report         HPI:  TIFFANY HU is a 82 year old female with history of HTN, HLD, hyperthyroidism, goiter, and recent hospitalization 11/22-11/26 for dysphagia and weakness who presents back to [facility] with generalized weakness, lethargy, and poor PO intake.     Unable to obtain full HPI due to underlying mental status.  She is currently on HFNC and falls asleep in between by questions.  She reportedly presents to [facility] with lethargy, dyspnea, and generalized weakness, found to have fever, acute hypoxic respiratory failure, hypotension requiring Levophed for ~12 hours in ED, and COVID positive.  She reportedly had an episode of diarrhea prior to arrival.    ROS:   Unable to obtain full ROS due to underlying mental status.    PMHX/PSHX:    HTN (hypertension)    HLD (hyperlipidemia)    No significant past surgical history      Allergies:  fish (Other)  penicillin (Hives)      Home Medications: reviewed  Hospital Medications:  azithromycin  IVPB      cefepime   IVPB 1000 milliGRAM(s) IV Intermittent every 24 hours  dexAMETHasone  Injectable 6 milliGRAM(s) IV Push daily  dextrose 5%. 1000 milliLiter(s) IV Continuous <Continuous>  heparin   Injectable 5000 Unit(s) SubCutaneous every 8 hours      Social History:   Unable to obtain due to underlying mental status.    Family history:    No pertinent family history in first degree relatives        PHYSICAL EXAM:   Vital Signs:  Vital Signs Last 24 Hrs  T(C): 36.4 (04 Dec 2022 11:32), Max: 38.6 (03 Dec 2022 13:57)  T(F): 97.5 (04 Dec 2022 11:32), Max: 101.4 (03 Dec 2022 13:57)  HR: 87 (04 Dec 2022 11:35) (71 - 126)  BP: 110/55 (04 Dec 2022 11:32) (88/56 - 152/77)  BP(mean): 81 (03 Dec 2022 18:34) (76 - 94)  RR: 22 (04 Dec 2022 11:35) (17 - 30)  SpO2: 90% (04 Dec 2022 11:35) (89% - 100%)    Parameters below as of 04 Dec 2022 11:35  Patient On (Oxygen Delivery Method): nasal cannula, high flow  O2 Flow (L/min): 50  O2 Concentration (%): 70  Daily Height in cm: 160.02 (03 Dec 2022 13:55)    Daily     GENERAL: no acute distress, appears stated age  HENT: NCAT; oropharynx eval limited, dry MM, on HF NC  EYES: anicteric sclerae, moist conjunctivae; no lid-lag  NECK: Trachea midline; FROM, supple, no significant masses noted  CHEST: no respiratory distress, no accessory muscle use  CARDIAC: regular rate, +S1/S2  ABDOMEN: soft, left sided tenderness to palpation, no rebound or guarding  EXTREMITIES: warm, well perfused  SKIN: no lesions noted  PSYCH: Awake alert, but somewhat confused  NEURO: No tremor, asterixis     LABS: reviewed                        16.1   10.96 )-----------( 145      ( 04 Dec 2022 07:05 )             51.4     12-04    158<H>  |  121<H>  |  57<H>  ----------------------------<  137<H>  3.4<L>   |  22  |  1.27    Ca    8.9      04 Dec 2022 07:05  Phos  3.1     12-04  Mg     2.10     12-04    TPro  4.9<L>  /  Alb  2.2<L>  /  TBili  1.1  /  DBili  x   /  AST  306<H>  /  ALT  145<H>  /  AlkPhos  96  12-04    LIVER FUNCTIONS - ( 04 Dec 2022 07:05 )  Alb: 2.2 g/dL / Pro: 4.9 g/dL / ALK PHOS: 96 U/L / ALT: 145 U/L / AST: 306 U/L / GGT: x               Diagnostic Studies: see sunrise for full report

## 2022-12-04 NOTE — CONSULT NOTE ADULT - SUBJECTIVE AND OBJECTIVE BOX
General Surgery Consult  Consulting surgical team: B Surgery  Consulting attending:    HPI:  82F with PMH HTN, HLD, hyperthyroidism (untreated), recent hospitalization - for dysphagia and weakness found to have multinodular goiter presents with generalized weakness and poor PO intake. Family reported after patient left the hospital she was eating and drinking very little. Over the last week pt has become more lethargic and walking around less. Prior to previous hospitalization pt was independent and working. Family brought pt to hospital because they were worried about mental status and poor PO intake. No recent changes in medications. Had 1x episode of diarrhea prior to hospitalization. Pt endorsed SOB. Denied chest pain, n/v, fever, chills, cough.      ED VS: febrile 101.4, HR 120s, /77, placed on HFNC at 50/50. Labs significant for Na 159, BUN 52, corrected Ca 11.7, and SCr 1.47. CT head negative. CT angio abd showed nonspecific enterocolitis with wall thickening of the descending and sigmoid segments of the colon and bilateral dependent pneumonia in the lower lobes. Received 1u pRBCs due to concern for GI bleed (blood on thermometer when taking rectal temp). Received tylenol, cefepime, vancomycin. Pt became persistently hypotensive to SBP 80s and started on levophed which was weaned off after 2L IVF.      Surgery called as pt w/ CTA showing colitis in descending and sigmoid colon. Pt reports she is having mild abdominal pain. She reports she has not been eating over the past few weeks however likely not related to her abdominal pain. She reports she has had diarrhea and does not know if her stools have been dark or red.     PAST MEDICAL HISTORY:  HTN (hypertension)    HLD (hyperlipidemia)        PAST SURGICAL HISTORY:  No significant past surgical history        MEDICATIONS:  azithromycin  IVPB      azithromycin  IVPB 500 milliGRAM(s) IV Intermittent once  cefepime   IVPB 1000 milliGRAM(s) IV Intermittent every 24 hours  dexAMETHasone  Injectable 6 milliGRAM(s) IV Push daily  heparin   Injectable 5000 Unit(s) SubCutaneous every 8 hours  lactated ringers. 1000 milliLiter(s) IV Continuous <Continuous>  potassium chloride  10 mEq/100 mL IVPB 10 milliEquivalent(s) IV Intermittent every 1 hour      ALLERGIES:  fish (Other)  penicillin (Hives)      VITALS & I/Os:  Vital Signs Last 24 Hrs  T(C): 36.3 (04 Dec 2022 00:17), Max: 38.6 (03 Dec 2022 13:57)  T(F): 97.4 (04 Dec 2022 00:17), Max: 101.4 (03 Dec 2022 13:57)  HR: 76 (04 Dec 2022 04:56) (74 - 126)  BP: 102/60 (04 Dec 2022 00:17) (88/56 - 152/77)  BP(mean): 81 (03 Dec 2022 18:34) (76 - 94)  RR: 22 (04 Dec 2022 04:56) (17 - 30)  SpO2: 96% (04 Dec 2022 04:56) (89% - 100%)    Parameters below as of 04 Dec 2022 04:56  Patient On (Oxygen Delivery Method): nasal cannula, high flow  O2 Flow (L/min): 50  O2 Concentration (%): 50    I&O's Summary    03 Dec 2022 07:01  -  04 Dec 2022 06:17  --------------------------------------------------------  IN: 0 mL / OUT: 50 mL / NET: -50 mL        PHYSICAL EXAM:  General: No acute distress  Respiratory: Nonlabored  Cardiovascular: RRR  Abdominal: Soft, nondistended, mild L sided abdominal tenderness. No rebound or guarding. No organomegaly, no palpable mass.  Extremities: Warm    LABS:                        16.6   8.00  )-----------( 131      ( 03 Dec 2022 16:39 )             53.6     12-04    158<H>  |  120<H>  |  59<H>  ----------------------------<  169<H>  3.3<L>   |  24  |  1.35<H>    Ca    9.1      04 Dec 2022 03:01  Phos  2.9     12-04  Mg     2.30     12-04    TPro  5.4<L>  /  Alb  2.3<L>  /  TBili  1.5<H>  /  DBili  x   /  AST  444<H>  /  ALT  174<H>  /  AlkPhos  105  12-03    Lactate:  - @ 03:01  3.4   @ 16:39  11.1   @ 14:49  15.5   @ 14:14  16.5    PT/INR - ( 03 Dec 2022 14:14 )   PT: 15.1 sec;   INR: 1.30 ratio         PTT - ( 03 Dec 2022 14:14 )  PTT:31.2 sec          Urinalysis Basic - ( 03 Dec 2022 14:26 )    Color: Yellow / Appearance: Slightly Turbid / S.022 / pH: x  Gluc: x / Ketone: Small  / Bili: Small / Urobili: <2 mg/dL   Blood: x / Protein: 30 mg/dL / Nitrite: Negative   Leuk Esterase: Negative / RBC: 10 /HPF / WBC 14 /HPF   Sq Epi: x / Non Sq Epi: 12 /HPF / Bacteria: Negative        IMAGING:  < from: CT Angio Abdomen and Pelvis w/ IV Cont (22 @ 19:30) >  FINDINGS:  Limitations: No reformatted images were provided. Reformatted images were  generated at the workstation.    Lungs: Centrilobular pulmonary emphysema. Bilateral dependent   consolidation  and/or atelectasis in the lower lobes.    Aorta: Aortoiliac atherosclerosis includingthe origins of all major   branch  vessels. No aortic aneurysm. No aortic dissection.  Celiac trunk and mesenteric arteries: No occlusion or significant   stenosis of  the celiac axis. Mild stenosis of the superior mesenteric artery roughly   6 cm  distal to the origin. No occlusion. No occlusion or significant stenosis   of the  inferior mesenteric artery. No filling defects are identified.  Renal arteries: Mild stenosis at the origins. No occlusion.  Right iliac arteries: Severe stenosis of thedistal right common iliac   artery.  No occlusion.  Right femoral/popliteal arteries: Mild stenosis of the visualized femoral  arteries.  Left iliac arteries: Mild to moderate stenosis along the common and   external  iliac arteries. No occlusion.  Left femoral/popliteal arteries: Moderate stenosis of the left common   femoral  artery.  Veins: No mesenteric venous thrombosis is identified. Portal vein is   patent.  Hepatic veins and inferior vena cava are patent.    Liver: No focal liver lesion is identified. No portal venous gas.  Gallbladder and bile ducts: No visualized gallstones (not all gallstones   are  visible via CT). No bile duct dilation.  Pancreas: No peripancreatic inflammatory change or significant pancreatic   duct  dilation.  Spleen: The spleen is relatively small. No focal splenic lesion.  Adrenal glands: 20 mm right adrenal nodule contains a small focus of  calcification. Left adrenal is unremarkable.  Kidneys and ureters: The kidneys enhance symmetrically. No perfusion   defects or  hydronephrosis. Bilateral nonobstructive renal stones.  Stomach and bowel: Fluid in the stomach. No focal gastric wall thickening  accounting for incomplete distention. There is fluid in loops of small   bowel  and colon without evidenceof obstruction. No small bowel wall thickening   or  pneumatosis. There is wall thickening of the descending and sigmoid   segments of  the colon. No pneumatosis. No visualized diverticula.  Appendix: No evidence of appendicitis.  Intraperitoneal space: No free fluid or free air.  Lymph nodes: Unremarkable. No enlarged lymph nodes.    Urinary bladder: Air in the urinary bladder likely from Plaza catheter  placement.  Reproductive: Small calcified, degenerated fibroids. No visualized adnexal  mass.  Bones/joints: Mild chronic compression deformity at L2. No acute fracture   is  identified. Mild thoracolumbar dextrocurvature.  Soft tissues: Unremarkable.    IMPRESSION:  1. Nonspecific enterocolitis with wall thickening of the descending and   sigmoid  segments of the colon. An infectious etiology is favored. Bowel ischemia  remains on the differential diagnosis. However, there is no evidence of  mesenteric artery occlusion or mesenteric vein thrombosis. No pneumatosis   or  portal venous gas  2. Bilateral dependent pneumonia in the lower lobes. Aspiration pneumonia  should be considered.    < end of copied text >

## 2022-12-04 NOTE — H&P ADULT - PROBLEM SELECTOR PLAN 3
Na 159->158  - Na 141 one week ago, likely 2/2 to poor PO intake  - S/p 2L IVF in ED  - free water deficit 3.3L  - monitor BMP q6   - careful correction no more than 12 mEq over first 24 hours  - start LR @ 75cc/hr  - encourage PO Na 159->158  - Na 141 one week ago, likely 2/2 to poor PO intake  - S/p 2L IVF in ED  - free water deficit 3.3L  - monitor BMP q6   - careful correction no more than 12 mEq over first 24 hours  - 1L LR and start LR @ 75cc/hr  - consider starting D5 if Na hasn't lowered on next draw   - encourage PO

## 2022-12-04 NOTE — H&P ADULT - HISTORY OF PRESENT ILLNESS
82F with PMH HTN, HLD, hyperthyroidism (untreated), recent hospitalization 11/22-11/26 for dysphagia and weakness found to have multinodular goiter presents with generalized weakness and poor PO intake. Family reported after patient left the hospital she was eating and drinking very little. Over the last week pt has become more lethargic and walking around less. Prior to previous hospitalization pt was independent and working. Family brought pt to hospital because they were worried about mental status and poor PO intake. No recent changes in medications. Had 1x episode of diarrhea prior to hospitalization. Pt endorsed SOB. Denied chest pain, n/v, fever, chills, cough.      ED VS: febrile 101.4, HR 120s, /77, placed on HFNC at 50/50. Labs significant for Na 159, BUN 52, corrected Ca 11.7, and SCr 1.47. CT head negative. CT angio abd showed nonspecific enterocolitis with wall thickening of the descending and sigmoid segments of the colon. An infectious etiology is favored. Bowel ischemia remains on the differential diagnosis. However, there is no evidence of mesenteric artery occlusion or mesenteric vein thrombosis. No pneumatosis or portal venous gas 2. Bilateral dependent pneumonia in the lower lobes. Received 1u pRBCs due to concern for GI bleed (blood on thermometer when taking rectal temp). Received tylenol, cefepime, vancomycin. Pt became persistently hypotensive to SBP 80s and started on levophed which was weaned off after 2L IVF.  82F with PMH HTN, HLD, hyperthyroidism (untreated), recent hospitalization 11/22-11/26 for dysphagia and weakness found to have multinodular goiter presents with generalized weakness and poor PO intake. Family reported after patient left the hospital she was eating and drinking very little. Over the last week pt has become more lethargic and walking around less. Prior to previous hospitalization pt was independent and working. Family brought pt to hospital because they were worried about mental status and poor PO intake. No recent changes in medications. Had 1x episode of diarrhea prior to hospitalization. Pt endorsed SOB. Denied chest pain, n/v, fever, chills, cough.      ED VS: febrile 101.4, HR 120s, /77, placed on HFNC at 50/50. Labs significant for Na 159, BUN 52, corrected Ca 11.7, and SCr 1.47. CT head negative. CT angio abd showed nonspecific enterocolitis with wall thickening of the descending and sigmoid segments of the colon and bilateral dependent pneumonia in the lower lobes. Received 1u pRBCs due to concern for GI bleed (blood on thermometer when taking rectal temp). Received tylenol, cefepime, vancomycin. Pt became persistently hypotensive to SBP 80s and started on levophed which was weaned off after 2L IVF.

## 2022-12-04 NOTE — H&P ADULT - PROBLEM SELECTOR PLAN 10
DVT ppx: heparin subq  Diet: pureed  Dispo: pending cultures    DNR/DNI DVT ppx: heparin subq  Diet: NPO  Dispo: pending cultures    DNR/DNI

## 2022-12-04 NOTE — H&P ADULT - PROBLEM SELECTOR PLAN 2
COVID positive 12/3  - on HF 50/50  - remdesivir (12/4-12/8)  - decadron (12/4-12/14)  - trend CMP while on remdesivir COVID positive 12/3  - on HF 50/50, wean as tolerated  - remdesivir (12/4-12/8)  - decadron (12/4-12/14)  - trend CMP while on remdesivir COVID positive 12/3  - on HF 50/50, wean as tolerated  - decadron (12/4-12/14)  - will eval starting remdesivir based on next Cr

## 2022-12-04 NOTE — CONSULT NOTE ADULT - ASSESSMENT
82 year old female with history of HTN, HLD, hyperthyroidism, goiter, and recent hospitalization 11/22-11/26 for dysphagia and weakness who presents back to [facility] with generalized weakness, lethargy, dyspnea, and generalized weakness, found to have fever, acute hypoxic respiratory failure, hypotension requiring Levophed for ~12 hours in ED, and COVID positive.      # Colitis on imaging: CTA performed upon admission reveals "colitis" in descending and sigmoid colon; given overall clinical picture, abdominal tenderness, and hypotension requiring pressors upon admission, most compatible with ischemic colitis.  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  Other etiologies in DDx include infectious colitis [especially in setting of acute severe COVID infection]  # COVID pneumonia  # Acute hypoxic respiratory failure: currently on HFNC  # Septic shock briefly requiring pressors   # Elevated liver chemistries:  likely due to underlying COVID infection versus septic shock; liver chemistries already mildly improved; no hepatobiliary pathology on CT A/P    Recommendations:  -trend clinical symptoms, exam findings, vital signs, CBC, CMP, INR  -optimize hemodynamics and treatment of COVID infection per primary team  -unclear presence and timing of diarrhea, however if present would obtain C diff and GI panel PCR  -no role for colonoscopy at this time    Note incomplete until finalized by attending signature/attestation.    Norman Hu  GI/Hepatology Fellow    MONDAY-FRIDAY 8AM-5PM:  Pager# [phone] ([facility]) or [phone] ([facility])    NON-URGENT CONSULTS:  Please email [email or web address] OR [email or web address]  AT NIGHT AND ON WEEKENDS:  Contact on-call GI fellow via answering service ([phone]) from 5pm-8am and on weekends/holidays

## 2022-12-04 NOTE — H&P ADULT - PROBLEM SELECTOR PLAN 8
TSH 0.15, T4 535, T3 67  - multinodular goiter found on last admission   - endo on last admission rec'd to avoid contrast studies, had CT angio this admission  - endo c/s in AM TSH 0.15, T4 535, T3 67  - multinodular goiter found on last admission   - endo on last admission rec'd to avoid contrast studies, had CT angio this admission  - endo c/s (email sent) TSH 0.15, T4 535, T3 67  - multinodular goiter found on last admission   - endo on last admission rec'd to avoid contrast studies, had CT angio this admission  - endo c/s sent to [email or web address]

## 2022-12-04 NOTE — H&P ADULT - NSHPPHYSICALEXAM_GEN_ALL_CORE
VITALS:   T(C): 36.3 (12-04-22 @ 00:17), Max: 38.6 (12-03-22 @ 13:57)  HR: 74 (12-04-22 @ 00:17) (74 - 126)  BP: 102/60 (12-04-22 @ 00:17) (88/56 - 152/77)  RR: 24 (12-04-22 @ 00:17) (17 - 30)  SpO2: 94% (12-04-22 @ 00:17) (89% - 100%)    GENERAL: NAD, ill-appearing  HEAD:  Atraumatic, normocephalic  EYES: EOMI, PERRLA, conjunctiva and sclera clear  NECK: Supple, no JVD  HEART: Regular rate and rhythm, no murmurs, rubs, or gallops  LUNGS: Clear to auscultation bilaterally, no crackles, wheezing, or rhonchi. On HF  ABDOMEN: Soft, nondistended, epigastric tenderness   EXTREMITIES: 2+ peripheral pulses bilaterally. No clubbing, cyanosis, or edema  NERVOUS SYSTEM:  A&Ox2, moving all extremities   SKIN: No rashes or lesions

## 2022-12-04 NOTE — H&P ADULT - NSHPREVIEWOFSYSTEMS_GEN_ALL_CORE
REVIEW OF SYSTEMS:  CONSTITUTIONAL: No fever, chills, night sweats, or fatigue  EYES: No eye pain, visual disturbances, or discharge  ENMT:  No difficulty hearing, tinnitus, vertigo; No sinus or throat pain  NECK: No pain or stiffness  RESPIRATORY: ++SOB No cough, wheezing, or hemoptysis.  CARDIOVASCULAR: No chest pain, palpitations, dizziness, or leg swelling  GASTROINTESTINAL: ++diarrhea No abdominal or epigastric pain. No nausea, vomiting, or hematemesis; No constipation. No melena or hematochezia.  GENITOURINARY: No dysuria, frequency, hematuria, or incontinence  NEUROLOGICAL: No headaches, memory loss, loss of strength, numbness, or tremors  SKIN: No itching, burning, rashes, or lesions   LYMPH NODES: No enlarged glands  ENDOCRINE: No heat or cold intolerance; No hair loss  MUSCULOSKELETAL: No joint pain or swelling; No muscle, back, or extremity pain  PSYCHIATRIC: No depression, anxiety, mood swings, or difficulty sleeping  HEME/LYMPH: No easy bruising, or bleeding gums  ALLERGY AND IMMUNOLOGIC: No hives or eczema

## 2022-12-05 NOTE — DIETITIAN INITIAL EVALUATION ADULT - PERTINENT LABORATORY DATA
12-05    150<H>  |  114<H>  |  52<H>  ----------------------------<  141<H>  3.2<L>   |  25  |  0.87    Ca    8.5      05 Dec 2022 06:00  Phos  1.8     12-05  Mg     2.20     12-05    TPro  4.8<L>  /  Alb  1.6<L>  /  TBili  0.7  /  DBili  x   /  AST  136<H>  /  ALT  93<H>  /  AlkPhos  103  12-05  POCT Blood Glucose.: 144 mg/dL (12-05-22 @ 06:03)

## 2022-12-05 NOTE — PROGRESS NOTE ADULT - PROBLEM SELECTOR PLAN 8
TSH 0.15, T4 535, T3 67  - multinodular goiter found on last admission   - endo on last admission rec'd to avoid contrast studies, had CT angio this admission  - endo c/s sent to [email or web address]

## 2022-12-05 NOTE — DIETITIAN INITIAL EVALUATION ADULT - ORAL INTAKE PTA/DIET HISTORY
Patient is A&Ox2. As per chart review, patient was on puree and thin liquids diet consistency during last admission, see speech and swallow evaluation dated 11/23/2022. As per last RD note from previous stay dated 11/23/2022, patient had 20lbs weight loss over 6 weeks due to difficulty swallowing, usual body weight 124lbs, 6 weeks ago. As per H&P dated 12/4/2022, family reports patient was eating very little at home and had 1 episode of diarrhea PTA. Patient is allergic to fish, as per chart.

## 2022-12-05 NOTE — PROGRESS NOTE ADULT - SUBJECTIVE AND OBJECTIVE BOX
[facility] DIVISION OF INFECTIOUS DISEASES  JYOTI Dawson Y. Patel, S. Shah, G. Casimir  and providing coverage with Daren Coles MD  Providing Infectious Disease Consultations at [facility]'s      Office# [phone] to schedule follow up appointments  Answering Service for urgent calls or New Consults [phone]    Infectious Diseases Progress Note:  TIFFANY HU is a 82y year old Female patient    COVID-19 Patient  Lethargic but awakens to name  Complains of abd pain, denies n/v  Denies dyspnea or cough, on HFNC  Per RN, had BM overnight but not diarrhea  Notes reviewed    Allergies: fish (Other)  penicillin (Hives)    ANTIBIOTICS/RELEVANT:  Current Antimicrobials:  cefepime   IVPB 1000 milliGRAM(s) IV Intermittent every 24 hours  metroNIDAZOLE  IVPB 500 milliGRAM(s) IV Intermittent every 8 hours  remdesivir  IVPB 100 milliGRAM(s) IV Intermittent every 24 hours  remdesivir  IVPB   IV Intermittent     Prior/Completed Antimicrobials:  azithromycin  IVPB  cefepime   IVPB  remdesivir  IVPB  vancomycin  IVPB.    Other Meds: dexAMETHasone  Injectable 6 milliGRAM(s) IV Push daily  dextrose 5%. 1000 milliLiter(s) IV Continuous <Continuous>  heparin   Injectable 5000 Unit(s) SubCutaneous every 8 hours  potassium chloride   Powder 40 milliEquivalent(s) Oral once    Immunologic:   Objective:  Vital Signs Last 24 Hrs  T(F): 97.8 (05 Dec 2022 04:40), Max: 97.8 (05 Dec 2022 04:40)  HR: 66 (05 Dec 2022 10:34) (64 - 84)  BP: 111/84 (05 Dec 2022 04:40) (83/47 - 111/84)  RR: 20 (05 Dec 2022 10:34) (20 - 22)  SpO2: 97% (05 Dec 2022 10:34) (87% - 98%)  PHYSICAL EXAM:  General: no acute distress, HFNC  HEENT: NC/AT, anicteric, supple  Respiratory: decreased breath sounds b/l  Cardiovascular: S1 S2 present, normal rate  Gastrointestinal: soft, mild generalized TTP, nondistended  Neuro: lethargic but arousable, answers simple questions  Extremities: no edema, no cyanosis    LABS:                        14.7   11.53 )-----------( 110      ( 05 Dec 2022 06:00 )             46.4     WBC trend:  11.53  @ 06:00  10.96 12-04 @ 07:05  8.00 12-03 @ 16:39  11.35 12 @ 15:07    12    150<H>  |  114<H>  |  52<H>  ----------------------------<  141<H>  3.2<L>   |  25  |  0.87    Ca    8.5      05 Dec 2022 06:00  Phos  1.8       Mg     2.20         TPro  4.8<L>  /  Alb  1.6<L>  /  TBili  0.7  /  DBili  x   /  AST  136<H>  /  ALT  93<H>  /  AlkPhos  103      Creatinine, Serum: 0.87 mg/dL (22 @ 06:00)  Creatinine, Serum: 0.91 mg/dL (22 @ 21:37)  Creatinine, Serum: 0.99 mg/dL (22 @ 12:56)  Creatinine, Serum: 1.27 mg/dL (22 @ 07:05)  Creatinine, Serum: 1.35 mg/dL (22 @ 03:01)  Creatinine, Serum: 1.35 mg/dL (22 @ 16:39)  Creatinine, Serum: 1.47 mg/dL (22 @ 14:14)    Urinalysis Basic - ( 03 Dec 2022 14:26 )  Color: Yellow / Appearance: Slightly Turbid / S.022 / pH: x  Gluc: x / Ketone: Small  / Bili: Small / Urobili: <2 mg/dL   Blood: x / Protein: 30 mg/dL / Nitrite: Negative   Leuk Esterase: Negative / RBC: 10 /HPF / WBC 14 /HPF   Sq Epi: x / Non Sq Epi: 12 /HPF / Bacteria: Negative    Auto Neutrophil #: 10.67 K/uL (22 @ 15:07)  Auto Lymphocyte #: 0.23 K/uL (22 @ 15:07)    D-Dimer Assay, Quantitative: 2105 ng/mL DDU (22 @ 07:05)    Lactate, Blood: 2.1 mmol/L (22 @ 11:16)  Lactate, Blood: 3.6 mmol/L (22 @ 06:00)  Lactate, Blood: 2.8 mmol/L (22 @ 11:20)    Prothrombin Time, Plasma: 15.1 sec (22 @ 14:14)  Activated Partial Thromboplastin Time: 31.2 sec (22 @ 14:14)    MICROBIOLOGY: reviewed  Influenza A (RapRVP): [facility] (03 Dec 2022 14:20)    Culture - Urine (collected 22 @ 14:26)  Source: Clean Catch Clean Catch (Midstream)  Final Report (22 @ 17:17):    <10,000 CFU/mL Normal Urogenital Shira    Culture - Blood (collected 22 @ 13:59)  Source: .Blood Blood-Peripheral  Preliminary Report (22 @ 19:02):    No growth to date.    Culture - Blood (collected 22 @ 13:55)  Source: .Blood Blood-Peripheral  Preliminary Report (22 @ 19:02):    No growth to date.    RADIOLOGY & ADDITIONAL STUDIES: reviewed

## 2022-12-05 NOTE — PROGRESS NOTE ADULT - PROBLEM SELECTOR PLAN 6
- CT angio abd: Nonspecific enterocolitis with wall thickening of the descending and sigmoid segments of the colon. An infectious etiology is favored. Bowel ischemia remains on the differential diagnosis. However, there is no evidence of mesenteric artery occlusion or mesenteric vein thrombosis. No pneumatosis or portal venous gas  - NPO for now  - cefepime 12/4/22 -->  - metronidazole 12/4/22 -->  - appreciate GI/surgey recs    #?GI bleed  - Blood seen on thermometer used for rectal temperature per MICU note  - Hgb 14.1 on admission  - s/p 1u pRBC  - dark brown stool seen on exam  - trend CBC and monitor for signs of bleeding - CT angio abd: Nonspecific enterocolitis with wall thickening of the descending and sigmoid segments of the colon. An infectious etiology is favored. Bowel ischemia remains on the differential diagnosis. However, there is no evidence of mesenteric artery occlusion or mesenteric vein thrombosis. No pneumatosis or portal venous gas  - NPO for now  - cefepime 12/3/22 -->  - metronidazole 12/4/22 -->  - appreciate GI/surgey recs    #?GI bleed  - Blood seen on thermometer used for rectal temperature per MICU note  - Hgb 14.1 on admission  - s/p 1u pRBC  - dark brown stool seen on exam  - trend CBC and monitor for signs of bleeding

## 2022-12-05 NOTE — PHYSICAL THERAPY INITIAL EVALUATION ADULT - PRECAUTIONS/LIMITATIONS, REHAB EVAL
AIRBORNE/CONTACT- +COVID-19/cardiac precautions/fall precautions/isolation precautions/oxygen therapy device and L/min

## 2022-12-05 NOTE — PHYSICAL THERAPY INITIAL EVALUATION ADULT - GENERAL OBSERVATIONS, REHAB EVAL
Pt encountered in semisupine position, no distress, lethargic, with +IV, +cardiac monitor,  +pulse oximeter, and on +high flow nasal cannula. Pt agreeable to participate in PT Evaluation.

## 2022-12-05 NOTE — PATIENT PROFILE ADULT - FALL HARM RISK - HARM RISK INTERVENTIONS
Assistance with ambulation/Assistance OOB with selected safe patient handling equipment/Communicate Risk of Fall with Harm to all staff/Discuss with provider need for PT consult/Monitor gait and stability/Reinforce activity limits and safety measures with patient and family/Tailored Fall Risk Interventions/Visual Cue: Yellow wristband and red socks/Bed in lowest position, wheels locked, appropriate side rails in place/Call bell, personal items and telephone in reach/Instruct patient to call for assistance before getting out of bed or chair/Non-slip footwear when patient is out of bed/[place] to call system/Physically safe environment - no spills, clutter or unnecessary equipment/Purposeful Proactive Rounding/Room/bathroom lighting operational, light cord in reach

## 2022-12-05 NOTE — DIETITIAN INITIAL EVALUATION ADULT - PERTINENT MEDS FT
MEDICATIONS  (STANDING):  cefepime   IVPB 1000 milliGRAM(s) IV Intermittent every 24 hours  dexAMETHasone  Injectable 6 milliGRAM(s) IV Push daily  dextrose 5%. 1000 milliLiter(s) (75 mL/Hr) IV Continuous <Continuous>  heparin   Injectable 5000 Unit(s) SubCutaneous every 8 hours  metroNIDAZOLE  IVPB 500 milliGRAM(s) IV Intermittent every 8 hours  remdesivir  IVPB 100 milliGRAM(s) IV Intermittent every 24 hours  remdesivir  IVPB   IV Intermittent     MEDICATIONS  (PRN):

## 2022-12-05 NOTE — PROGRESS NOTE ADULT - PROBLEM SELECTOR PLAN 1
T 101.4, HR 120s. GI and PNA possible sources  - s/p vanc and cefepime in ED  - CT angio abd showed nonspecific enterocolitis with wall thickening of the descending and sigmoid segments of the colon and bilateral dependent pneumonia in the lower lobes  - COVID positive, refer to COVID section for plan  - send GI PCR, stool culture, and stool ova and parasites if she has diarrhea  - vanc dosed by level (12/3- ), cefepime (12/3- ), azithro (12/4- )  - f/u urine and blood cultures, MRSA PCR, urine legionella T 101.4, HR 120s. GI and PNA possible sources  - s/p vanc and cefepime in ED  - CT angio abd showed nonspecific enterocolitis with wall thickening of the descending and sigmoid segments of the colon and bilateral dependent pneumonia in the lower lobes  - COVID positive, refer to COVID section for plan  - send GI PCR, stool culture, and stool ova and parasites if she has diarrhea  - vanc dosed by level (12/3- ), cefepime (12/3- ), azithro (12/4- )  - cont on cefepime; metronidazole added 12/5/22  - f/u urine and blood cultures, MRSA PCR, urine legionella T 101.4, HR 120s. GI and PNA possible sources  - s/p vanc and cefepime in ED  - CT angio abd showed nonspecific enterocolitis with wall thickening of the descending and sigmoid segments of the colon and bilateral dependent pneumonia in the lower lobes  - COVID positive, refer to COVID section for plan  - send GI PCR, stool culture, and stool ova and parasites if she has diarrhea  - vanc dosed by level (12/3- ), cefepime (12/3- ), azithro (12/4- )  - cont on cefepime; metronidazole added 12/5/22  - blood/urine cxs 12/3 (-)  - Legionella Ur Ag 12/3 -->  - MRSA PCR 12/4 (+)

## 2022-12-05 NOTE — PHYSICAL THERAPY INITIAL EVALUATION ADULT - ADDITIONAL COMMENTS
Pt reports that she lives in a private house with her family with ~5 steps to enter; (+)bilateral handrails; and a flight of stairs to negotiate to bedroom; (+)1 handrail. Prior to hospital admission pt was completely independent and used a single axis cane with ambulation. Pt reports that she also owns a rolling walker. Pt denies any recent falls.    Pt left comfortable in bed, NAD, all lines intact, all precautions maintained, with call bell in reach, bed alarm on, and RN aware of PT evaluation.

## 2022-12-05 NOTE — PROGRESS NOTE ADULT - PROBLEM SELECTOR PLAN 7
Trop 87 -> 99  - pt denied chest pain  - EKG non-ischemic   - likely i/s/o demand  - repeat trop with AM labs

## 2022-12-05 NOTE — PROGRESS NOTE ADULT - SUBJECTIVE AND OBJECTIVE BOX
Lethargic but arousable  Saturating mid-high 90s on 70% Hi-David NCO2    Vital Signs Last 24 Hrs  T(C): 36.6 (05 Dec 2022 04:40), Max: 36.6 (05 Dec 2022 04:40)  T(F): 97.8 (05 Dec 2022 04:40), Max: 97.8 (05 Dec 2022 04:40)  HR: 64 (05 Dec 2022 08:05) (64 - 87)  BP: 111/84 (05 Dec 2022 04:40) (83/47 - 111/84)  BP(mean): --  RR: 20 (05 Dec 2022 08:05) (20 - 23)  SpO2: 98% (05 Dec 2022 08:05) (87% - 98%)    I&O's Summary    22 @ 07:01  -  22 @ 07:00  --------------------------------------------------------  IN: 375 mL / OUT: 300 mL / NET: 75 mL          GENERAL: NAD, ill-appearing  HEAD:  Atraumatic, normocephalic  EYES: EOMI, PERRLA, conjunctiva and sclera clear  NECK: Supple, no JVD, no carotid bruits  HEART: Regular rate and rhythm, no murmurs, rubs, or gallops  LUNGS: Clear to auscultation bilaterally, no crackles, wheezing, or rhonchi. On HF  ABDOMEN: Soft, nondistended, epigastric tenderness, no hepatosplenomegaly  EXTREMITIES: 2+ peripheral pulses bilaterally. No clubbing, cyanosis, or edema  NERVOUS SYSTEM:  A&Ox2, moving all extremities     LABS:                        14.7   11.53 )-----------( 110      ( 05 Dec 2022 06:00 )             46.4     12-    150<H>  |  114<H>  |  52<H>  ----------------------------<  141<H>  3.2<L>   |  25  |  0.87    Ca    8.5      05 Dec 2022 06:00  Phos  1.8     12-  Mg     2.20     12-    TPro  4.8<L>  /  Alb  1.6<L>  /  TBili  0.7  /  DBili  x   /  AST  136<H>  /  ALT  93<H>  /  AlkPhos  103  12-05    PT/INR - ( 03 Dec 2022 14:14 )   PT: 15.1 sec;   INR: 1.30 ratio         PTT - ( 03 Dec 2022 14:14 )  PTT:31.2 sec  CAPILLARY BLOOD GLUCOSE      POCT Blood Glucose.: 144 mg/dL (05 Dec 2022 06:03)        Urinalysis Basic - ( 03 Dec 2022 14:26 )    Color: Yellow / Appearance: Slightly Turbid / S.022 / pH: x  Gluc: x / Ketone: Small  / Bili: Small / Urobili: <2 mg/dL   Blood: x / Protein: 30 mg/dL / Nitrite: Negative   Leuk Esterase: Negative / RBC: 10 /HPF / WBC 14 /HPF   Sq Epi: x / Non Sq Epi: 12 /HPF / Bacteria: Negative        RADIOLOGY & ADDITIONAL TESTS:    Imaging Personally Reviewed:  [x] YES  [ ] NO    Will obtain old records:  [ ] YES  [x] NO

## 2022-12-05 NOTE — PHYSICAL THERAPY INITIAL EVALUATION ADULT - MUSCLE TONE ASSESSMENT, REHAB EVAL
moderate to severely decreased muscle mass/bilateral upper extremities/bilateral lower extremities/normal

## 2022-12-05 NOTE — DIETITIAN INITIAL EVALUATION ADULT - OTHER INFO
82 year old Female with PMH HTN, HLD, hyperthyroidism (untreated), recent hospitalization 11/22-11/26 for dysphagia and weakness found to have multinodular goiter presents with generalized weakness likely 2/2 poor PO intake found to be septic and s/p short course of pressors for likely hypovolemic shock, per chart.   As per chart review, patient has colitis, currently on NPO. Information obtained from floor RN, no reports of any nausea, vomiting, diarrhea, constipation at this time. RN reports last bowel movement 12/4/2022. Labs reviewed: noted low potassium-3.2(12/5), low magnesium- 1.1 (12/4)low phos- 1.8 (12/5). On potassium chloride, magnesium sulfate for repletion.  As per flow sheet, current weight: 49.7kg/109.5lbs (12/5/2022), weight from last admission: 51.7kg/114lbs (11/23/2022). Noted with weight loss of 2kg/3.8%BW x 2 weeks. Compared with the reported usual body weight-124lbs, noted patient has weight loss of 14.5lbs/11.6%BW loss x 2 months.

## 2022-12-05 NOTE — PHYSICAL THERAPY INITIAL EVALUATION ADULT - DIAGNOSIS, PT EVAL
Pt admitted for Sepsis 2/2 COVID-19; pt presents with decreased strength, decreased balance, and decreased aerobic capacity/endurance.

## 2022-12-05 NOTE — PROGRESS NOTE ADULT - SUBJECTIVE AND OBJECTIVE BOX
Acute Care Surgery Progress Note     Subjective/24hour Events:   Patient seen and examined. Continues to have diarrhea and mild abdominal pain.      Vital Signs:  Vital Signs Last 24 Hrs  T(C): 36.6 (05 Dec 2022 04:40), Max: 36.6 (05 Dec 2022 04:40)  T(F): 97.8 (05 Dec 2022 04:40), Max: 97.8 (05 Dec 2022 04:40)  HR: 71 (05 Dec 2022 04:50) (66 - 87)  BP: 111/84 (05 Dec 2022 04:40) (83/47 - 111/84)  BP(mean): --  RR: 20 (05 Dec 2022 04:50) (18 - 23)  SpO2: 96% (05 Dec 2022 04:50) (87% - 97%)    Parameters below as of 05 Dec 2022 04:50  Patient On (Oxygen Delivery Method): nasal cannula, high flow  O2 Flow (L/min): 60  O2 Concentration (%): 70    CAPILLARY BLOOD GLUCOSE      POCT Blood Glucose.: 144 mg/dL (05 Dec 2022 06:03)      I&O's Detail    04 Dec 2022 07:01  -  05 Dec 2022 07:00  --------------------------------------------------------  IN:    dextrose 5%: 375 mL  Total IN: 375 mL    OUT:    Indwelling Catheter - Urethral (mL): 300 mL    Oral Fluid: 0 mL  Total OUT: 300 mL    Total NET: 75 mL            Physical Exam:  Gen: NAD  CV: Regular rate  Resp: HFNC in place  Abd: Soft, nondistended, mild diffuse abdominal tenderness, no rebound or guarding noted  Ext: Warm and perfused      Labs:    12-04    153<H>  |  119<H>  |  53<H>  ----------------------------<  164<H>  4.8   |  24  |  0.91    Ca    8.7      04 Dec 2022 21:37  Phos  2.2     12-04  Mg     1.90     12-04    TPro  4.9<L>  /  Alb  1.8<L>  /  TBili  0.8  /  DBili  x   /  AST  177<H>  /  ALT  109<H>  /  AlkPhos  96  12-04    LIVER FUNCTIONS - ( 04 Dec 2022 21:37 )  Alb: 1.8 g/dL / Pro: 4.9 g/dL / ALK PHOS: 96 U/L / ALT: 109 U/L / AST: 177 U/L / GGT: x                                 16.1   10.96 )-----------( 145      ( 04 Dec 2022 07:05 )             51.4     PT/INR - ( 03 Dec 2022 14:14 )   PT: 15.1 sec;   INR: 1.30 ratio         PTT - ( 03 Dec 2022 14:14 )  PTT:31.2 sec

## 2022-12-05 NOTE — DIETITIAN INITIAL EVALUATION ADULT - ADD RECOMMEND
1. When clinically permits, initiate po diet as tolerated. Recommend a speech and swallow evaluation to determine diet consistency.  2. If patient to remain NPO or unable to use gut, suggest initiating alternate means of nutrition support.  3. Monitor and replete electrolytes.   4. Monitor labs and hydration status.  5. Reconsult nutrition service if warranted.

## 2022-12-06 NOTE — PROVIDER CONTACT NOTE (OTHER) - ASSESSMENT
Patient desatting on high flow nasal cannula 50/70. Desats to 85%, then comes back up to around 88%. Nasal prongs and head straps adjusted. Respiratory called to possibly increase settings, but patient improved to 92%.
Patient noted in any acute distress
pt is sleepy but arousal. pt denies any complaints. HR 75
Patient noted lethargic, low BP 96/77 and O2 sat noted in between high 80's and low 90's. Patient on high flow
patient resting in bed in no acute distress.
pt is sleepy but arousal. pt denies any complaints
patient arousable but sleepy. No signs of increased workload of breathing or respiratory distress.

## 2022-12-06 NOTE — PROGRESS NOTE ADULT - PROBLEM SELECTOR PLAN 10
DVT ppx: heparin subq  Diet: NPO  Dispo: pending cultures    DNR/DNI DVT ppx: heparin subq  Diet: NPO pending bedside dysphagia screen  Dispo: pending clinical resolution of acute issues    DNR/DNI

## 2022-12-06 NOTE — PROVIDER CONTACT NOTE (OTHER) - ACTION/TREATMENT ORDERED:
JOSE Lugo at bedside.
recheck BP in an hour as per provider orders.
will place order for 50 liters and 70% oxygen concentration.
Will come and see the patient at bedside. Safety maintained.
recheck BP in an hour as per provider orders.
Ordered IV potassium 10 mEq/ 100ml x 3. Safety maintained.
call provider if respiratory increases settings of high flow. Repeat BMP at 13:00.

## 2022-12-06 NOTE — PROGRESS NOTE ADULT - PROBLEM SELECTOR PLAN 8
TSH 0.15, T4 535, T3 67  - multinodular goiter found on last admission   - endo on last admission rec'd to avoid contrast studies, had CT angio this admission  - endo c/s sent to [email or web address] 0.15, T4 535, T3 67  - multinodular goiter found on last admission   - endo on last admission rec'd to avoid contrast studies, had CT angio this admission  - endo c/s sent to [email or web address]  - no clinical signs of hyperthyroidism, no need for beta-blockade or methimazole for now

## 2022-12-06 NOTE — PROVIDER CONTACT NOTE (OTHER) - REASON
pt BP 83/47
pt BP 99/52
Low BP and O2 sat noted in between high 80's and low 90's
Patient maintaining oxygen saturation at 87-88% on 50 L 50% O2 concentration via high flow nasal cannula
patient refused PO Potassium tablet
Patient's high flow settings changed to 50 liters and 70% O2 concentration, satting at 90-91%
Patient desatting on high flow nasal cannula

## 2022-12-06 NOTE — PROGRESS NOTE ADULT - PROBLEM SELECTOR PLAN 1
T 101.4, HR 120s. GI and PNA possible sources  - s/p vanc and cefepime in ED  - CT angio abd showed nonspecific enterocolitis with wall thickening of the descending and sigmoid segments of the colon and bilateral dependent pneumonia in the lower lobes  - COVID positive, refer to COVID section for plan  - send GI PCR, stool culture, and stool ova and parasites if she has diarrhea  - initially received vanco (12/3- ), cefepime (12/3- ), azithro (12/4- )  - cont on cefepime; metronidazole added 12/5/22  - blood/urine cxs 12/3 (-)  - Legionella Ur Ag 12/3 -->  - MRSA PCR 12/4 (+) T 101.4, HR 120s. GI and PNA possible sources  - s/p vanc and cefepime in ED  - CT angio abd showed nonspecific enterocolitis with wall thickening of the descending and sigmoid segments of the colon and bilateral dependent pneumonia in the lower lobes  - COVID positive, refer to COVID section for plan  - send GI PCR, stool culture, and stool ova and parasites if she has diarrhea  - initially received vanco (12/3- ), cefepime (12/3- ), azithro (12/4- )  - cont on cefepime; metronidazole added 12/5/22  - blood/urine cxs 12/3 (-)  - Legionella Ur Ag 12/4 (-)  - MRSA PCR 12/4 (+)

## 2022-12-06 NOTE — PROGRESS NOTE ADULT - PROBLEM SELECTOR PLAN 6
- CT angio abd: Nonspecific enterocolitis with wall thickening of the descending and sigmoid segments of the colon. An infectious etiology is favored. Bowel ischemia remains on the differential diagnosis. However, there is no evidence of mesenteric artery occlusion or mesenteric vein thrombosis. No pneumatosis or portal venous gas  - NPO for now; speech/swallow bedside evaluation pending  - cefepime 12/3/22 -->  - metronidazole 12/4/22 -->  - appreciate GI/surgey recs    #?GI bleed  - Blood seen on thermometer used for rectal temperature per MICU note  - Hgb 14.1 on admission  - s/p 1u pRBC  - dark brown stool seen on exam  - trend CBC and monitor for signs of bleeding - CT angio abd: Nonspecific enterocolitis with wall thickening of the descending and sigmoid segments of the colon. An infectious etiology is favored. Bowel ischemia remains on the differential diagnosis. However, there is no evidence of mesenteric artery occlusion or mesenteric vein thrombosis. No pneumatosis or portal venous gas  - NPO for now; bedside dysphagia screen evaluation is pending  - cefepime 12/3/22 -->  - metronidazole 12/4/22 -->  - appreciate GI/surgey recs    #?GI bleed  - Blood seen on thermometer used for rectal temperature per MICU note  - Hgb 14.1 on admission  - s/p 1u pRBC 12/3 with appropriate response  - dark brown stool seen on exam  - trend CBC and monitor for signs of bleeding

## 2022-12-06 NOTE — PROVIDER CONTACT NOTE (OTHER) - SITUATION
Patient desatting on high flow nasal cannula
pt BP 99/52
Patient maintaining oxygen saturation at 87-88% on 50 L 50% O2 concentration via high flow nasal cannula. Respiratory therapist called to evaluate need to increase settings of high flow
Patient's high flow settings changed to 50 liters and 70% O2 concentration, satting at 90-91%
pt BP 83/47
Low BP and O2 sat noted in between high 80's and low 90's. Patient on high flow
Patient Potassium level 2.9. ordered for PO supplement.  patient refused PO Potassium tablet

## 2022-12-06 NOTE — PROGRESS NOTE ADULT - SUBJECTIVE AND OBJECTIVE BOX
Lethargic but arousable  No fevers overnight  Saturating mid-high 90s on 70% Hi-David NCO2    Vital Signs Last 24 Hrs  T(C): 36.4 (06 Dec 2022 04:55), Max: 36.8 (05 Dec 2022 18:28)  T(F): 97.5 (06 Dec 2022 04:55), Max: 98.3 (05 Dec 2022 18:28)  HR: 77 (06 Dec 2022 04:55) (60 - 84)  BP: 141/72 (06 Dec 2022 04:55) (119/53 - 141/72)  BP(mean): --  RR: 18 (06 Dec 2022 08:45) (18 - 20)  SpO2: 98% (06 Dec 2022 08:45) (93% - 98%)    I&O's Summary    12-05-22 @ 07:01  -  12-06-22 @ 07:00  --------------------------------------------------------  IN: 600 mL / OUT: 600 mL / NET: 0 mL        GENERAL: NAD, ill-appearing  HEAD:  Atraumatic, normocephalic  EYES: EOMI, PERRLA, conjunctiva and sclera clear  NECK: Supple, no JVD, no carotid bruits  HEART: Regular rate and rhythm, no murmurs, rubs, or gallops  LUNGS: Clear to auscultation bilaterally, no crackles, wheezing, or rhonchi.   ABDOMEN: Soft, nondistended, epigastric tenderness, no hepatosplenomegaly  EXTREMITIES: 2+ peripheral pulses bilaterally. No clubbing, cyanosis, or edema  NERVOUS SYSTEM:  A&Ox1-2, no focal posturing    LABS:                        13.8   10.64 )-----------( 100      ( 06 Dec 2022 06:10 )             43.5     12-06    153<H>  |  119<H>  |  49<H>  ----------------------------<  178<H>  3.7   |  26  |  0.66    Ca    8.1<L>      06 Dec 2022 06:10  Phos  1.6     12-06  Mg     2.00     12-06    TPro  4.5<L>  /  Alb  1.7<L>  /  TBili  0.6  /  DBili  0.4<H>  /  AST  92<H>  /  ALT  71<H>  /  AlkPhos  102  12-06      CAPILLARY BLOOD GLUCOSE                RADIOLOGY & ADDITIONAL TESTS:    Imaging Personally Reviewed:  [x] YES  [ ] NO    Will obtain old records:  [ ] YES  [x] NO

## 2022-12-06 NOTE — PROVIDER CONTACT NOTE (OTHER) - NAME OF MD/NP/PA/DO NOTIFIED:
[facility] [id number]
Aissatou Boateng, ACP
Paulo Simeon
Aissatou Boateng, ACP
[facility] [id number]
[facility] [id number]
Paulo Simeon

## 2022-12-06 NOTE — PROGRESS NOTE ADULT - PROBLEM SELECTOR PLAN 7
Trop 87 -> 99  - pt denied chest pain  - EKG non-ischemic   - likely i/s/o demand  - repeat trop with AM labs Trop 87 -> 99 -> 69  - pt denied chest pain  - EKG non-ischemic   - likely i/s/o demand  - can consider TTE when clinically stable from respiratory POV

## 2022-12-06 NOTE — PROVIDER CONTACT NOTE (OTHER) - DATE AND TIME:
04-Dec-2022 11:20
04-Dec-2022 21:00
04-Dec-2022 16:26
04-Dec-2022 15:24
04-Dec-2022 13:40
06-Dec-2022 01:08

## 2022-12-06 NOTE — PROVIDER CONTACT NOTE (OTHER) - BACKGROUND
Patient admitted with noninfectious gastroenteritis and admitted with COVID
patient admitted for generalized weakness likely due to poor PO intake. Patient met sepsis criteria upon arrival. Hypernatremic to 158. Covid positive
patient admitted for generalized weakness likely due to poor PO intake. Patient met sepsis criteria upon arrival and was found to be hypernatremic to 158. Covid positive
Patient admitted with noninfectious gastroenteritis and admitted with COVID
admitted with hypotension.

## 2022-12-06 NOTE — PROGRESS NOTE ADULT - SUBJECTIVE AND OBJECTIVE BOX
[facility] DIVISION OF INFECTIOUS DISEASES  JYOTI Dawson Y. Patel, S. Shah, G. Casimir  and providing coverage with Daren Coles MD  Providing Infectious Disease Consultations at [facility], [facility]'s      Office# [phone] to schedule follow up appointments  Answering Service for urgent calls or New Consults [phone]    Infectious Diseases Progress Note:  TIFFANY HU is a 82y year old Female patient    COVID-19 Patient  Remains on HFNC  Lethargic but arousable, denies pain  Notes reviewed  No concerning events overnight.   Allergies: fish (Other)  penicillin (Hives)    ANTIBIOTICS/RELEVANT:  Current Antimicrobials:  cefepime   IVPB 1000 milliGRAM(s) IV Intermittent every 12 hours  metroNIDAZOLE  IVPB 500 milliGRAM(s) IV Intermittent every 8 hours  remdesivir  IVPB 100 milliGRAM(s) IV Intermittent every 24 hours  remdesivir  IVPB   IV Intermittent     Prior/Completed Antimicrobials:  azithromycin  IVPB  cefepime   IVPB  remdesivir  IVPB  vancomycin  IVPB.    Other Meds: dexAMETHasone  Injectable 6 milliGRAM(s) IV Push daily  dextrose 5%. 1000 milliLiter(s) IV Continuous <Continuous>  dextrose 5%. 1000 milliLiter(s) IV Continuous <Continuous>  heparin   Injectable 5000 Unit(s) SubCutaneous every 8 hours    Immunologic:   Objective:  Vital Signs Last 24 Hrs  T(F): 97.5 (06 Dec 2022 04:55), Max: 98.3 (05 Dec 2022 18:28)  HR: 77 (06 Dec 2022 04:55) (60 - 84)  BP: 141/72 (06 Dec 2022 04:55) (119/53 - 141/72)  RR: 18 (06 Dec 2022 08:45) (18 - 20)  SpO2: 98% (06 Dec 2022 08:45) (93% - 98%)  PHYSICAL EXAM:  General: no acute distress, HFNC  HEENT: NC/AT, anicteric, supple  Respiratory: no acc muscle use, breathing comfortably  Cardiovascular: S1 S2 present, normal rate  Gastrointestinal: soft, mild generalized TTP, nondistended  Extremities: no edema, no cyanosis    LABS:                        13.8   10.64 )-----------( 100      ( 06 Dec 2022 06:10 )             43.5     WBC trend:  10.64 12-06 @ 06:10  11.53 12-05 @ 06:00  10.96 12-04 @ 07:05  8.00 12-03 @ 16:39  11.35 12-03 @ 15:07    12-06    153<H>  |  119<H>  |  49<H>  ----------------------------<  178<H>  3.7   |  26  |  0.66    Ca    8.1<L>      06 Dec 2022 06:10  Phos  1.6     12-06  Mg     2.00     12-06    TPro  4.5<L>  /  Alb  1.7<L>  /  TBili  0.6  /  DBili  0.4<H>  /  AST  92<H>  /  ALT  71<H>  /  AlkPhos  102  12-06    Creatinine, Serum: 0.66 mg/dL (12-06-22 @ 06:10)  Creatinine, Serum: 0.66 mg/dL (12-06-22 @ 06:10)  Creatinine, Serum: 0.70 mg/dL (12-05-22 @ 23:49)  Creatinine, Serum: 0.73 mg/dL (12-05-22 @ 17:20)  Creatinine, Serum: 0.87 mg/dL (12-05-22 @ 06:00)  Creatinine, Serum: 0.91 mg/dL (12-04-22 @ 21:37)  Creatinine, Serum: 0.99 mg/dL (12-04-22 @ 12:56)  Creatinine, Serum: 1.27 mg/dL (12-04-22 @ 07:05)  Creatinine, Serum: 1.35 mg/dL (12-04-22 @ 03:01)  Creatinine, Serum: 1.35 mg/dL (12-03-22 @ 16:39)  Creatinine, Serum: 1.47 mg/dL (12-03-22 @ 14:14)    Auto Neutrophil #: 10.67 K/uL (12-03-22 @ 15:07)    Auto Lymphocyte #: 0.23 K/uL (12-03-22 @ 15:07)    D-Dimer Assay, Quantitative: 2105 ng/mL DDU (12-04-22 @ 07:05)    Lactate, Blood: 1.7 mmol/L (12-05-22 @ 23:49)  Lactate, Blood: 2.3 mmol/L (12-05-22 @ 16:50)  Lactate, Blood: 2.3 mmol/L (12-05-22 @ 15:55)  Lactate, Blood: 2.1 mmol/L (12-05-22 @ 11:16)  Lactate, Blood: 3.6 mmol/L (12-05-22 @ 06:00)  Lactate, Blood: 2.8 mmol/L (12-04-22 @ 11:20)    Prothrombin Time, Plasma: 15.1 sec (12-03-22 @ 14:14)    Activated Partial Thromboplastin Time: 31.2 sec (12-03-22 @ 14:14)    MICROBIOLOGY: reviewed  Influenza A (RapRVP): Angélicate (03 Dec 2022 14:20)    Culture - Urine (collected 12-03-22 @ 14:26)  Source: Clean Catch Clean Catch (Midstream)  Final Report (12-04-22 @ 17:17):    <10,000 CFU/mL Normal Urogenital Shira    Culture - Blood (collected 12-03-22 @ 13:59)  Source: .Blood Blood-Peripheral  Preliminary Report (12-04-22 @ 19:02):    No growth to date.    Culture - Blood (collected 12-03-22 @ 13:55)  Source: .Blood Blood-Peripheral  Preliminary Report (12-04-22 @ 19:02):    No growth to date.    RADIOLOGY & ADDITIONAL STUDIES: reviewed

## 2022-12-07 NOTE — PROGRESS NOTE ADULT - SUBJECTIVE AND OBJECTIVE BOX
SUBJECTIVE/ OVERNIGHT EVENTS:  weak  fatigue  on hi-flow  cachetic  appetite poor  no cp, no sob, no n/v/d. no abdominal pain.  no headache, no dizziness.   denied pain   tele stable  refused swallow eval today    --------------------------------------------------------------------------------------------  LABS:                        13.7   9.03  )-----------( 91       ( 07 Dec 2022 06:13 )             42.4     12-07    146<H>  |  113<H>  |  34<H>  ----------------------------<  216<H>  3.4<L>   |  23  |  0.54    Ca    7.4<L>      07 Dec 2022 06:13  Phos  1.8     12-07  Mg     1.60     12-07    TPro  4.5<L>  /  Alb  1.7<L>  /  TBili  0.6  /  DBili  0.4<H>  /  AST  92<H>  /  ALT  71<H>  /  AlkPhos  102  12-06      CAPILLARY BLOOD GLUCOSE        RADIOLOGY & ADDITIONAL TESTS:    Imaging Personally Reviewed:  [x] YES  [ ] NO    Consultant(s) Notes Reviewed:  [x] YES  [ ] NO    MEDICATIONS  (STANDING):  cefepime   IVPB 1000 milliGRAM(s) IV Intermittent every 12 hours  dexAMETHasone  Injectable 6 milliGRAM(s) IV Push daily  dextrose 5%. 1000 milliLiter(s) (65 mL/Hr) IV Continuous <Continuous>  heparin   Injectable 5000 Unit(s) SubCutaneous every 8 hours  metroNIDAZOLE  IVPB 500 milliGRAM(s) IV Intermittent every 8 hours  remdesivir  IVPB   IV Intermittent   remdesivir  IVPB 100 milliGRAM(s) IV Intermittent every 24 hours    MEDICATIONS  (PRN):      Care Discussed with Consultants/Other Providers [x] YES  [ ] NO    Vital Signs Last 24 Hrs  T(C): 36.3 (07 Dec 2022 20:55), Max: 36.3 (07 Dec 2022 05:20)  T(F): 97.3 (07 Dec 2022 20:55), Max: 97.3 (07 Dec 2022 05:20)  HR: 69 (07 Dec 2022 20:55) (58 - 69)  BP: 155/73 (07 Dec 2022 20:55) (145/76 - 160/81)  BP(mean): --  RR: 20 (07 Dec 2022 20:55) (18 - 20)  SpO2: 91% (07 Dec 2022 20:55) (91% - 97%)    Parameters below as of 07 Dec 2022 20:55  Patient On (Oxygen Delivery Method): nasal cannula, high flow  O2 Flow (L/min): 40  O2 Concentration (%): 60  I&O's Summary    06 Dec 2022 07:01  -  07 Dec 2022 07:00  --------------------------------------------------------  IN: 0 mL / OUT: 750 mL / NET: -750 mL        PHYSICAL EXAM:  GENERAL: NAD, ill-appearing, thin, cachetic, on hi-flow O2  HEAD:  Atraumatic, normocephalic  EYES: EOMI, PERRLA, conjunctiva and sclera clear  NECK: Supple, no JVD, no carotid bruits  HEART: Regular rate and rhythm, no murmurs, rubs, or gallops  CHEST/LUNG: mild decrease breath sounds bilaterally; No wheeze   ABDOMEN: Soft, nondistended, epigastric tenderness, no hepatosplenomegaly  EXTREMITIES: 2+ peripheral pulses bilaterally. No clubbing, cyanosis, or edema  NERVOUS SYSTEM:  A&Ox1-2, no focal posturing

## 2022-12-07 NOTE — PROGRESS NOTE ADULT - NSPROGADDITIONALINFOA_GEN_ALL_CORE
overall prognosis is poor.  Family contemplating PEG.  Per CM note, the daughter in law Sharon requesting palliative eval for GOC and PEG discussion.    d/w NP.     - Dr. MARISELA Benavidez ([facility])  - [phone]

## 2022-12-07 NOTE — PROGRESS NOTE ADULT - PROBLEM SELECTOR PLAN 7
Trop 87 -> 99 -> 69  - pt denied chest pain  - EKG non-ischemic   - likely i/s/o demand  - can consider TTE when clinically stable from respiratory POV

## 2022-12-07 NOTE — PROGRESS NOTE ADULT - PROBLEM SELECTOR PLAN 6
- CT angio abd: Nonspecific enterocolitis with wall thickening of the descending and sigmoid segments of the colon. An infectious etiology is favored. Bowel ischemia remains on the differential diagnosis. However, there is no evidence of mesenteric artery occlusion or mesenteric vein thrombosis. No pneumatosis or portal venous gas  - NPO for now; bedside dysphagia screen evaluation is pending (pt refused)  - cefepime 12/3/22 -->  - metronidazole 12/4/22 -->  - appreciate GI/surgey recs    #?GI bleed  - Blood seen on thermometer used for rectal temperature per MICU note  - Hgb 14.1 on admission  - s/p 1u pRBC 12/3 with appropriate response  - dark brown stool seen on exam  - trend CBC and monitor for signs of bleeding

## 2022-12-07 NOTE — PROGRESS NOTE ADULT - SUBJECTIVE AND OBJECTIVE BOX
[facility] DIV[facility] OF INFECTIOUS DISEASES  JYOTI Dawson Y. Patel, S. Shah, G. Casimir  and providing coverage with Daren Coles MD  Providing Infectious Disease Consultations at [facility], [facility]'s      Office# [phone] to schedule follow up appointments  Answering Service for urgent calls or New Consults [phone]    Infectious Diseases Progress Note:  TIFFANY HU is a 82y year old Female patient    COVID-19 Patient  Remains on HFNC  Denies dyspnea or any pain  Notes reviewed    Allergies: fish (Other)  penicillin (Hives)    ANTIBIOTICS/RELEVANT:  Current Antimicrobials:  cefepime   IVPB 1000 milliGRAM(s) IV Intermittent every 12 hours  metroNIDAZOLE  IVPB 500 milliGRAM(s) IV Intermittent every 8 hours  remdesivir  IVPB 100 milliGRAM(s) IV Intermittent every 24 hours  remdesivir  IVPB   IV Intermittent     Prior/Completed Antimicrobials:  azithromycin  IVPB  cefepime   IVPB  remdesivir  IVPB  vancomycin  IVPB.    Other Meds: dexAMETHasone  Injectable 6 milliGRAM(s) IV Push daily  dextrose 5%. 1000 milliLiter(s) IV Continuous <Continuous>  heparin   Injectable 5000 Unit(s) SubCutaneous every 8 hours    Immunologic:   Objective:  Vital Signs Last 24 Hrs  T(F): 97.3 (07 Dec 2022 05:20), Max: 97.7 (06 Dec 2022 13:45)  HR: 62 (07 Dec 2022 05:20) (58 - 71)  BP: 160/81 (07 Dec 2022 05:20) (142/79 - 160/81)  RR: 18 (07 Dec 2022 09:13) (18 - 20)  SpO2: 94% (07 Dec 2022 09:13) (91% - 97%)  PHYSICAL EXAM:  General: no acute distress, HFNC  HEENT: NC/AT, anicteric, supple  Respiratory: decreased breath sounds b/l  Cardiovascular: S1 S2 present, normal rate  Gastrointestinal: soft, nontender, nondistended  Extremities: no edema, no cyanosis    LABS:                        13.7   9.03  )-----------( 91       ( 07 Dec 2022 06:13 )             42.4     WBC trend:  9.03 12-07 @ 06:13  10.64 12-06 @ 06:10  11.53 12-05 @ 06:00  10.96 12-04 @ 07:05  8.00 12-03 @ 16:39  11.35 12-03 @ 15:07    12-07    146<H>  |  113<H>  |  34<H>  ----------------------------<  216<H>  3.4<L>   |  23  |  0.54    Ca    7.4<L>      07 Dec 2022 06:13  Phos  1.8     12-07  Mg     1.60     12-07    TPro  4.5<L>  /  Alb  1.7<L>  /  TBili  0.6  /  DBili  0.4<H>  /  AST  92<H>  /  ALT  71<H>  /  AlkPhos  102  12-06    Creatinine, Serum: 0.54 mg/dL (12-07-22 @ 06:13)  Creatinine, Serum: 0.60 mg/dL (12-06-22 @ 13:45)  Creatinine, Serum: 0.66 mg/dL (12-06-22 @ 06:10)  Creatinine, Serum: 0.66 mg/dL (12-06-22 @ 06:10)  Creatinine, Serum: 0.70 mg/dL (12-05-22 @ 23:49)  Creatinine, Serum: 0.73 mg/dL (12-05-22 @ 17:20)  Creatinine, Serum: 0.87 mg/dL (12-05-22 @ 06:00)  Creatinine, Serum: 0.91 mg/dL (12-04-22 @ 21:37)  Creatinine, Serum: 0.99 mg/dL (12-04-22 @ 12:56)  Creatinine, Serum: 1.27 mg/dL (12-04-22 @ 07:05)  Creatinine, Serum: 1.35 mg/dL (12-04-22 @ 03:01)  Creatinine, Serum: 1.35 mg/dL (12-03-22 @ 16:39)  Creatinine, Serum: 1.47 mg/dL (12-03-22 @ 14:14)    Auto Neutrophil #: 10.67 K/uL (12-03-22 @ 15:07)  Auto Lymphocyte #: 0.23 K/uL (12-03-22 @ 15:07)    D-Dimer Assay, Quantitative: 2105 ng/mL DDU (12-04-22 @ 07:05)    Lactate, Blood: 1.7 mmol/L (12-05-22 @ 23:49)  Lactate, Blood: 2.3 mmol/L (12-05-22 @ 16:50)  Lactate, Blood: 2.3 mmol/L (12-05-22 @ 15:55)  Lactate, Blood: 2.1 mmol/L (12-05-22 @ 11:16)  Lactate, Blood: 3.6 mmol/L (12-05-22 @ 06:00)  Lactate, Blood: 2.8 mmol/L (12-04-22 @ 11:20)    Prothrombin Time, Plasma: 15.1 sec (12-03-22 @ 14:14)    Activated Partial Thromboplastin Time: 31.2 sec (12-03-22 @ 14:14)    MICROBIOLOGY: reviewed  Culture - Urine (collected 12-03-22 @ 14:26)  Source: Clean Catch Clean Catch (Midstream)  Final Report (12-04-22 @ 17:17):    <10,000 CFU/mL Normal Urogenital Shira    Culture - Blood (collected 12-03-22 @ 13:59)  Source: .Blood Blood-Peripheral  Preliminary Report (12-04-22 @ 19:02):    No growth to date.    Culture - Blood (collected 12-03-22 @ 13:55)  Source: .Blood Blood-Peripheral  Preliminary Report (12-04-22 @ 19:02):    No growth to date.    RADIOLOGY & ADDITIONAL STUDIES: reviewed

## 2022-12-07 NOTE — PROGRESS NOTE ADULT - PROBLEM SELECTOR PLAN 10
DVT ppx: heparin subq  Diet: NPO pending bedside dysphagia screen  Dispo: pending clinical resolution of acute issues    DNR/DNI

## 2022-12-07 NOTE — SWALLOW BEDSIDE ASSESSMENT ADULT - COMMENTS
Progress Note- Internal Medicine 12/6: "82F with PMH HTN, HLD, hyperthyroidism (untreated), severe protein-calorie malnutrition, recent hospitalization 11/22-11/26 for dysphagia and weakness found to have multinodular goiter presents with generalized weakness likely 2/2 poor PO intake found to be septic and s/p short course of pressors for likely hypovolemic shock."    CT Head 12/3: "IMPRESSION: No acute intracranial hemorrhage or mass effect. Age-related involutional changes and chronic small vessel ischemic disease."    CR Chest 12/3: "Impression: Chronic lung changes. No focal infiltrate."    ACP Elena ([id number]) reached prior to evaluation and given clearance for PO trials given concern for GI bleed.     SLP arrived to patient's room for clinical swallow evaluation this AM. Patient appears with lethargy however with arousal with verbal and tactile stimuli. Patient with adamant refusal for swallow evaluation despite max verbal encouragement therefore oral and pharyngeal stage could not be assessed and unable to provide additional recommendations. Patient noted high flow nasal cannula. Progress Note- Internal Medicine 12/6: "82F with PMH HTN, HLD, hyperthyroidism (untreated), severe protein-calorie malnutrition, recent hospitalization 11/22-11/26 for dysphagia and weakness found to have multinodular goiter presents with generalized weakness likely 2/2 poor PO intake found to be septic and s/p short course of pressors for likely hypovolemic shock."    CT Head 12/3: "IMPRESSION: No acute intracranial hemorrhage or mass effect. Age-related involutional changes and chronic small vessel ischemic disease."    CR Chest 12/3: "Impression: Chronic lung changes. No focal infiltrate."    ACP Elena ([id number]) reached prior to evaluation and given clearance for PO trials given concern for GI bleed.     SLP arrived to patient's room for clinical swallow evaluation this AM. Patient appears with lethargy however with arousal with verbal and tactile stimuli. Patient with adamant refusal for swallow evaluation despite max verbal encouragement therefore oral and pharyngeal stage could not be assessed and unable to provide additional recommendations. Patient noted high flow nasal cannula. Called out to ACP ([id number]).

## 2022-12-07 NOTE — PROGRESS NOTE ADULT - PROBLEM SELECTOR PLAN 1
T 101.4, HR 120s. GI and PNA possible sources  - s/p vanc and cefepime in ED  - CT angio abd showed nonspecific enterocolitis with wall thickening of the descending and sigmoid segments of the colon and bilateral dependent pneumonia in the lower lobes  - COVID positive, refer to COVID section for plan  - send GI PCR, stool culture, and stool ova and parasites if she has diarrhea  - initially received vanco (12/3- ), cefepime (12/3- ), azithro (12/4- )  - cont on cefepime; metronidazole added 12/5/22  - blood/urine cxs 12/3 (-)  - Legionella Ur Ag 12/4 (-)  - MRSA PCR 12/4 (+)

## 2022-12-07 NOTE — PROGRESS NOTE ADULT - PROBLEM SELECTOR PLAN 8
TSH 0.15, T4 535, T3 67  - multinodular goiter found on last admission   - endo on last admission rec'd to avoid contrast studies, had CT angio this admission  - endo c/s sent to [email or web address]  - no clinical signs of hyperthyroidism, no need for beta-blockade or methimazole for now

## 2022-12-08 NOTE — PROGRESS NOTE ADULT - PROBLEM SELECTOR PLAN 8
TSH 0.15, T4 535, T3 67  - multinodular goiter found on last admission   - endo on last admission rec'd to avoid contrast studies, had CT angio this admission  - endo c/s sent to [email or web address]  - no clinical signs of hyperthyroidism, no need for beta-blockade or methimazole for now

## 2022-12-08 NOTE — PROGRESS NOTE ADULT - NSPROGADDITIONALINFOA_GEN_ALL_CORE
overall prognosis is poor.  Family contemplating PEG.  Per CM note, the daughter in law Sharon requesting palliative eval for GOC and PEG discussion.  d/w NP.   Palliative consulted.    IF SHE agrees, should start NG tube feeding for nutritional purpose.     - Dr. MARISELA Benavidez ([facility])  - [phone]

## 2022-12-08 NOTE — PROGRESS NOTE ADULT - PROBLEM SELECTOR PLAN 1
T 101.4, HR 120s. GI and PNA possible sources  - s/p vanc and cefepime in ED  - CT angio abd showed nonspecific enterocolitis with wall thickening of the descending and sigmoid segments of the colon and bilateral dependent pneumonia in the lower lobes  - COVID positive, refer to COVID section for plan  - send GI PCR, stool culture, and stool ova and parasites if she has diarrhea  - initially received vanco (12/3- ), cefepime (12/3- ), azithro (12/4- )  - cont on cefepime; metronidazole added 12/5/22  - blood/urine cxs 12/3 (-)  - Legionella Ur Ag 12/4 (-)  - MRSA PCR 12/4 (+)  - abx per ID, until 12/8 tentatively

## 2022-12-08 NOTE — CHART NOTE - NSCHARTNOTEFT_GEN_A_CORE
Labs reviewed with attending changed D5 to D5LR at current rate of 65ml/hr. DDimer elevated on HSQ but pt wit thrombocytopenia as well. Monitor per attedning

## 2022-12-08 NOTE — PROGRESS NOTE ADULT - SUBJECTIVE AND OBJECTIVE BOX
SUBJECTIVE/ OVERNIGHT EVENTS:  refusing speech  remain on hi-flow  palliative following  pt more alert  plts dropping.   CTa neg for PE  d/c Hep SQ  send HIT ab  denied pain.       --------------------------------------------------------------------------------------------  LABS:                        14.8   5.39  )-----------( 84       ( 08 Dec 2022 17:29 )             45.8     12-08    141  |  101  |  20  ----------------------------<  209<H>  3.6   |  28  |  0.45<L>    Ca    7.1<L>      08 Dec 2022 17:29  Phos  2.6     12-08  Mg     2.00     12-08    TPro  4.5<L>  /  Alb  1.7<L>  /  TBili  0.6  /  DBili  0.3  /  AST  65<H>  /  ALT  52<H>  /  AlkPhos  131<H>  12-08      CAPILLARY BLOOD GLUCOSE                RADIOLOGY & ADDITIONAL TESTS:    Imaging Personally Reviewed:  [x] YES  [ ] NO    Consultant(s) Notes Reviewed:  [x] YES  [ ] NO    MEDICATIONS  (STANDING):  budesonide 160 MICROgram(s)/formoterol 4.5 MICROgram(s) Inhaler 2 Puff(s) Inhalation two times a day  cefepime   IVPB 1000 milliGRAM(s) IV Intermittent every 12 hours  dexAMETHasone  Injectable 6 milliGRAM(s) IV Push daily  dextrose 5% + lactated ringers. 1000 milliLiter(s) (65 mL/Hr) IV Continuous <Continuous>  metroNIDAZOLE  IVPB 500 milliGRAM(s) IV Intermittent every 8 hours    MEDICATIONS  (PRN):  albuterol    90 MICROgram(s) HFA Inhaler 2 Puff(s) Inhalation every 6 hours PRN Shortness of Breath and/or Wheezing      Care Discussed with Consultants/Other Providers [x] YES  [ ] NO    Vital Signs Last 24 Hrs  T(C): 36.1 (08 Dec 2022 20:00), Max: 36.3 (08 Dec 2022 04:54)  T(F): 97 (08 Dec 2022 20:00), Max: 97.4 (08 Dec 2022 04:54)  HR: 82 (08 Dec 2022 20:00) (55 - 82)  BP: 157/82 (08 Dec 2022 20:00) (131/61 - 157/82)  BP(mean): --  RR: 20 (08 Dec 2022 20:00) (18 - 20)  SpO2: 95% (08 Dec 2022 20:00) (92% - 96%)    Parameters below as of 08 Dec 2022 20:00  Patient On (Oxygen Delivery Method): nasal cannula, high flow      I&O's Summary        PHYSICAL EXAM:  GENERAL: NAD, ill-appearing, thin, cachetic, on hi-flow O2  HEAD:  Atraumatic, normocephalic  EYES: EOMI, PERRLA, conjunctiva and sclera clear  NECK: Supple, no JVD, no carotid bruits  HEART: Regular rate and rhythm, no murmurs, rubs, or gallops  CHEST/LUNG: mild decrease breath sounds bilaterally; No wheeze   ABDOMEN: Soft, nondistended, epigastric tenderness, no hepatosplenomegaly  EXTREMITIES: 2+ peripheral pulses bilaterally. No clubbing, cyanosis, or edema  NERVOUS SYSTEM:  A&Ox1-2, no focal posturing

## 2022-12-08 NOTE — CONSULT NOTE ADULT - SUBJECTIVE AND OBJECTIVE BOX
12-08-22 @ 15:54    Patient is a 82y old  Female who presents with a chief complaint of sepsis (08 Dec 2022 15:01)      HPI:  82F with PMH HTN, HLD, hyperthyroidism (untreated), recent hospitalization 11/22-11/26 for dysphagia and weakness found to have multinodular goiter presents with generalized weakness and poor PO intake. Family reported after patient left the hospital she was eating and drinking very little. Over the last week pt has become more lethargic and walking around less. Prior to previous hospitalization pt was independent and working. Family brought pt to hospital because they were worried about mental status and poor PO intake. No recent changes in medications. Had 1x episode of diarrhea prior to hospitalization. Pt endorsed SOB. Denied chest pain, n/v, fever, chills, cough.      ED VS: febrile 101.4, HR 120s, /77, placed on HFNC at 50/50. Labs significant for Na 159, BUN 52, corrected Ca 11.7, and SCr 1.47. CT head negative. CT angio abd showed nonspecific enterocolitis with wall thickening of the descending and sigmoid segments of the colon and bilateral dependent pneumonia in the lower lobes. Received 1u pRBCs due to concern for GI bleed (blood on thermometer when taking rectal temp). Received tylenol, cefepime, vancomycin. Pt became persistently hypotensive to SBP 80s and started on levophed which was weaned off after 2L IVF.  (04 Dec 2022 02:17)   and now she is found to have covid and hence pulm called: :  she is on high flow at 60%:   she does not much communicate :  in n o apparent resp distress     ?FOLLOWING PRESENT  [ x] Hx of PE/DVT, [x ] Hx COPD, [ ]x Hx of Asthma, [y ] Hx of Hospitalization, x[ ]  Hx of BiPAP/CPAP use, x[ ] Hx of MATT    Allergies    fish (Other)  penicillin (Hives)    Intolerances        PAST MEDICAL & SURGICAL HISTORY:  HTN (hypertension)      HLD (hyperlipidemia)      No significant past surgical history          FAMILY HISTORY:  No pertinent family history in first degree relatives        Social History: [  unk] TOBACCO                  [[  unk  ] ETOH                                 [  [  unk] IVDA/DRUGS    REVIEW OF SYSTEMS    cant tell me at this time  General:	    Skin/Breast:  	  Ophthalmologic:  	  ENMT:	    Respiratory and Thorax:  	  Cardiovascular:	    Gastrointestinal:	    Genitourinary:	    Musculoskeletal:	    Neurological:	    Psychiatric:	    Hematology/Lymphatics:	    Endocrine:	    Allergic/Immunologic:	    MEDICATIONS  (STANDING):  cefepime   IVPB 1000 milliGRAM(s) IV Intermittent every 12 hours  dexAMETHasone  Injectable 6 milliGRAM(s) IV Push daily  dextrose 5% + lactated ringers. 1000 milliLiter(s) (65 mL/Hr) IV Continuous <Continuous>  heparin   Injectable 5000 Unit(s) SubCutaneous every 8 hours  metroNIDAZOLE  IVPB 500 milliGRAM(s) IV Intermittent every 8 hours  remdesivir  IVPB   IV Intermittent   remdesivir  IVPB 100 milliGRAM(s) IV Intermittent every 24 hours    MEDICATIONS  (PRN):       Vital Signs Last 24 Hrs  T(C): 36.3 (08 Dec 2022 04:54), Max: 36.3 (07 Dec 2022 20:55)  T(F): 97.4 (08 Dec 2022 04:54), Max: 97.4 (08 Dec 2022 04:54)  HR: 63 (08 Dec 2022 15:24) (55 - 69)  BP: 131/61 (08 Dec 2022 04:54) (131/61 - 155/73)  BP(mean): --  RR: 19 (08 Dec 2022 15:24) (18 - 20)  SpO2: 96% (08 Dec 2022 15:24) (91% - 96%)    Parameters below as of 08 Dec 2022 15:24  Patient On (Oxygen Delivery Method): nasal cannula, high flow  O2 Flow (L/min): 50  O2 Concentration (%): 60Orthostatic VS          I&O's Summary      Physical Exam:   GENERAL: NAD, well-groomed, well-developed  HEENT: SHARON/   Atraumatic, Normocephalic  ENMT: No tonsillar erythema, exudates, or enlargement; Moist mucous membranes, Good dentition, No lesions  NECK: Supple, No JVD, Normal thyroid  CHEST/LUNG: Clear to auscultation bilaterally-  no wheezing:   CVS: Regular rate and rhythm; No murmurs, rubs, or gallops  GI: : Soft, Nontender, Nondistended; Bowel sounds present  NERVOUS SYSTEM:  Alert & awake:  : on high flow:   EXTREMITIES:  - edema  LYMPH: No lymphadenopathy noted  SKIN: No rashes or lesions  ENDOCRINOLOGY: No Thyromegaly  PSYCH: calm +    Labs:  Venous<46<4>>40<<7.415>>Venous<<3><<4><<5<<409>>, Venous<46<4>>48<<7.405>>Venous<<3><<4><<5<<489>>, Venous<50<4>>30<<7.395>>Venous<<3><<4><<5<<309>>, -3.0<47<4>>40<<7.315>>-3.0<<3><<4><<5<<409>>, Venous<53<4>>36<<7.225>>Venous<<3><<4><<5<<369>>, Venous<71<4>>22<<7.095>>Venous<<3><<4><<5<<229>>SARS-CoV-2: Detected (03 Dec 2022 14:20)                              13.4   6.31  )-----------( 83       ( 08 Dec 2022 06:40 )             40.2                         13.7   9.03  )-----------( 91       ( 07 Dec 2022 06:13 )             42.4                         13.8   10.64 )-----------( 100      ( 06 Dec 2022 06:10 )             43.5                         14.7   11.53 )-----------( 110      ( 05 Dec 2022 06:00 )             46.4     12-08    142  |  104  |  23  ----------------------------<  216<H>  2.8<LL>   |  27  |  0.46<L>  12-07    146<H>  |  113<H>  |  34<H>  ----------------------------<  216<H>  3.4<L>   |  23  |  0.54  12-06    152<H>  |  118<H>  |  45<H>  ----------------------------<  211<H>  4.0   |  23  |  0.60  12-06    153<H>  |  119<H>  |  49<H>  ----------------------------<  178<H>  3.7   |  26  |  0.66  12-05    151<H>  |  116<H>  |  50<H>  ----------------------------<  153<H>  2.9<LL>   |  25  |  0.70  12-05    156<H>  |  119<H>  |  50<H>  ----------------------------<  113<H>  3.7   |  24  |  0.73  12-05    150<H>  |  114<H>  |  52<H>  ----------------------------<  141<H>  3.2<L>   |  25  |  0.87  12-04    153<H>  |  119<H>  |  53<H>  ----------------------------<  164<H>  4.8   |  24  |  0.91    Ca    6.9<L>      08 Dec 2022 06:40  Ca    7.4<L>      07 Dec 2022 06:13  Phos  1.6     12-08  Phos  1.8     12-07  Mg     1.50     12-08  Mg     1.60     12-07    TPro  4.5<L>  /  Alb  1.7<L>  /  TBili  0.6  /  DBili  0.3  /  AST  65<H>  /  ALT  52<H>  /  AlkPhos  131<H>  12-08  TPro  4.5<L>  /  Alb  1.7<L>  /  TBili  0.6  /  DBili  0.4<H>  /  AST  92<H>  /  ALT  71<H>  /  AlkPhos  102  12-06  TPro  4.8<L>  /  Alb  1.6<L>  /  TBili  0.7  /  DBili  x   /  AST  136<H>  /  ALT  93<H>  /  AlkPhos  103  12-05  TPro  4.9<L>  /  Alb  1.8<L>  /  TBili  0.8  /  DBili  x   /  AST  177<H>  /  ALT  109<H>  /  AlkPhos  96  12-04    CAPILLARY BLOOD GLUCOSE        LIVER FUNCTIONS - ( 08 Dec 2022 06:40 )  Alb: 1.7 g/dL / Pro: 4.5 g/dL / ALK PHOS: 131 U/L / ALT: 52 U/L / AST: 65 U/L / GGT: x               D DImerLactate, Blood: 1.7 mmol/L (12-05 @ 23:49)  Lactate, Blood: 2.3 mmol/L (12-05 @ 16:50)  Lactate, Blood: 2.3 mmol/L (12-05 @ 15:55)  Lactate, Blood: 2.1 mmol/L (12-05 @ 11:16)  Lactate, Blood: 3.6 mmol/L (12-05 @ 06:00)    D-Dimer Assay, Quantitative: 2105 ng/mL DDU (12-04 @ 07:05)      Studies  Chest X-RAY  CT SCAN Chest   CT Abdomen  Venous Dopplers: LE:   Others  rad< from: Xray Chest 1 View-PORTABLE IMMEDIATE (12.03.22 @ 15:44) >    ACC: [id number] EXAM:  XR CHEST PORTABLE IMMED 1V                          PROCEDURE DATE:  12/03/2022          INTERPRETATION:  INDICATION: Sepsis    COMPARISON: 11/22/22 CT scan of the chest.    Technique: AP radiograph of the chest.    FINDINGS:  Heart/Vascular: The heart is at upper limits of normal in size. Known   thyroid goiter displaces trachea to the right.  Pulmonary: Coarsened reticular markings in both lungs - likely due to   chronic interstitial disease.  No focal infiltrate. No pleural effusion   or pneumothorax.  Bones: No acute bony finding.  Other: Surgical clips overlie the midline lower cervical spine region.    Impression:  Chronic lung changes.  No focal infiltrate.        --- End of Report ---            STEVE YOUSIF MD; Attending Radiologist  This document has been electronically signed. Dec  3 2022  3:51PM    < end of copied text >  < from: CT Chest w/ IV Cont (11.22.22 @ 21:03) >  extension, compatible with multinodular goiter.  Otherwise no evidence of   neck mass, fluid collection, inflammatory change or foreign body.  The   pharynx and larynx appear within normal limits.    CT CHEST:  1.  Multinodular goiter causes rightward deviation of the trachea,   without significant airway narrowing.  The central airways are clear.    The esophagus appears within normal limits.  2.  Severe centrilobular emphysema.  No suspicious pulmonary nodule or   mass.  3.  A nonspecific left upper paratracheal lymph node measures 1.5 x 1 cm.    < end of copied text >  < from: CT Chest w/ IV Cont (11.22.22 @ 21:03) >  extension, compatible with multinodular goiter.  Otherwise no evidence of   neck mass, fluid collection, inflammatory change or foreign body.  The   pharynx and larynx appear within normal limits.    CT CHEST:  1.  Multinodular goiter causes rightward deviation of the trachea,   without significant airway narrowing.  The central airways are clear.    The esophagus appears within normal limits.  2.  Severe centrilobular emphysema.  No suspicious pulmonary nodule or   mass.  3.  A nonspecific left upper paratracheal lymph node measures 1.5 x 1 cm.    < end of copied text >

## 2022-12-08 NOTE — CHART NOTE - NSCHARTNOTEFT_GEN_A_CORE
Pt seen for SEVERE MALNUTRITION FOLLOW UP      Medical Course: Unable to conduct nutrition interview 2/2 decreased cognition. Information obtained from comprehensive chart review and per PA today: No recent episodes of nausea, vomiting, diarrhea or constipation, BM noted on 12/7 per RN flowsheets. Pt remains NPO, refused swallow evaluation, diet rx was deferred to MD on 12/7 by speech team- refer to swallow eval note. Per conversation with PA today, will re-eval pt on saturday per family's wishes as they would like to be present for eval.     Diet Prescription: Diet, NPO:   Except Medications (12-04-22 @ 05:31)    Pertinent Medications: MEDICATIONS  (STANDING):  budesonide 160 MICROgram(s)/formoterol 4.5 MICROgram(s) Inhaler 2 Puff(s) Inhalation two times a day  cefepime   IVPB 1000 milliGRAM(s) IV Intermittent every 12 hours  dexAMETHasone  Injectable 6 milliGRAM(s) IV Push daily  dextrose 5% + lactated ringers. 1000 milliLiter(s) (65 mL/Hr) IV Continuous <Continuous>  heparin   Injectable 5000 Unit(s) SubCutaneous every 8 hours  metroNIDAZOLE  IVPB 500 milliGRAM(s) IV Intermittent every 8 hours  remdesivir  IVPB 100 milliGRAM(s) IV Intermittent every 24 hours  remdesivir  IVPB   IV Intermittent     MEDICATIONS  (PRN):  albuterol    90 MICROgram(s) HFA Inhaler 2 Puff(s) Inhalation every 6 hours PRN Shortness of Breath and/or Wheezing    Pertinent Labs: 12-08 Na142 mmol/L Glu 216 mg/dL<H> K+ 2.8 mmol/L<LL> Cr  0.46 mg/dL<L> BUN 23 mg/dL 12-08 Phos 1.6 mg/dL<L> 12-08 Alb 1.7 g/dL<L>    Weight: Height (cm): 167.6 (12-05 @ 04:40), 160 (11-23 @ 04:33)  Weight (kg): 49.7 (12-05 @ 04:40), 51.71 (11-23 @ 04:33)  BMI (kg/m2): 17.7 (12-05 @ 04:40), 20.2 (11-23 @ 04:33)  Weight Assessment: Pt noted with significant wt loss x2 weeks likely r/t to poor PO intake (-3.9%).       Physical Assessment, per flowsheets:  Edema: None noted  skin: intact     Estimated Needs:   [X] No change since previous assessment, based on  lb / 58.9 kg  30-35 kcal/kg 8309-7879 kcal/day,  1.2-1.5 gm/kg 70.68- 88.35 gm/day      Previous Nutrition Diagnosis: [x ] Malnutrition   Nutrition Diagnosis is [ x] ongoing  [ ] resolved [ ] not applicable   New Nutrition Diagnosis: [x ] not applicable     Interventions:   1) When medically feasible/ per family request, repeat swallow eval, if continue NPO consider alternate means of nutrition and hydration (EN/PN)  2) Continue to replete electrolytes as needed  3) Reconsult RD for nutrition recommendations after swallow eval completed.    Monitor & Evaluate:  PO intake, tolerance to diet/supplement, nutrition related lab values, weight trends, BMs/GI distress, hydration status, skin integrity.    Lexis Cano MS, RDN (Pager #[phone]) | Also available on TEAMS Pt seen for SEVERE MALNUTRITION FOLLOW UP      Medical Course: 83 y/o  female with PMH HTN, HLD, hyperthyroidism (untreated), severe protein-calorie malnutrition, recent hospitalization 11/22-11/26 for dysphagia and weakness found to have multinodular goiter presents with generalized weakness likely 2/2 poor PO intake found to be septic and s/p short course of pressors for likely hypovolemic shock.      Nutrition Course: Unable to conduct nutrition interview 2/2 decreased cognition. Information obtained from comprehensive chart review and per PA today: No recent episodes of nausea, vomiting, diarrhea or constipation, BM noted on 12/7 per RN flowsheets. Pt remains NPO, refused swallow evaluation, diet rx was deferred to MD on 12/7 by speech team- refer to swallow eval note. Per conversation with PA today, will re-eval pt on saturday per family's wishes as they would like to be present for eval.     Diet Prescription: Diet, NPO:   Except Medications (12-04-22 @ 05:31)    Pertinent Medications: MEDICATIONS  (STANDING):  budesonide 160 MICROgram(s)/formoterol 4.5 MICROgram(s) Inhaler 2 Puff(s) Inhalation two times a day  cefepime   IVPB 1000 milliGRAM(s) IV Intermittent every 12 hours  dexAMETHasone  Injectable 6 milliGRAM(s) IV Push daily  dextrose 5% + lactated ringers. 1000 milliLiter(s) (65 mL/Hr) IV Continuous <Continuous>  heparin   Injectable 5000 Unit(s) SubCutaneous every 8 hours  metroNIDAZOLE  IVPB 500 milliGRAM(s) IV Intermittent every 8 hours  remdesivir  IVPB 100 milliGRAM(s) IV Intermittent every 24 hours  remdesivir  IVPB   IV Intermittent     MEDICATIONS  (PRN):  albuterol    90 MICROgram(s) HFA Inhaler 2 Puff(s) Inhalation every 6 hours PRN Shortness of Breath and/or Wheezing    Pertinent Labs: 12-08 Na142 mmol/L Glu 216 mg/dL<H> K+ 2.8 mmol/L<LL> Cr  0.46 mg/dL<L> BUN 23 mg/dL 12-08 Phos 1.6 mg/dL<L> 12-08 Alb 1.7 g/dL<L>    Weight: Height (cm): 167.6 (12-05 @ 04:40), 160 (11-23 @ 04:33)  Weight (kg): 49.7 (12-05 @ 04:40), 51.71 (11-23 @ 04:33)  BMI (kg/m2): 17.7 (12-05 @ 04:40), 20.2 (11-23 @ 04:33)  Weight Assessment: Pt noted with significant wt loss x2 weeks likely r/t to poor PO intake (-3.9%).       Physical Assessment, per flowsheets:  Edema: None noted  skin: intact     Estimated Needs:   [X] No change since previous assessment, based on  lb / 58.9 kg  30-35 kcal/kg 9733-3291 kcal/day,  1.2-1.5 gm/kg 70.68- 88.35 gm/day      Previous Nutrition Diagnosis: [x ] Malnutrition   Nutrition Diagnosis is [ x] ongoing  [ ] resolved [ ] not applicable   New Nutrition Diagnosis: [x ] not applicable     Interventions:   1) When medically feasible/ per family request, repeat swallow eval, if continue NPO consider alternate means of nutrition and hydration (EN/PN)  2) Continue to replete electrolytes as needed  3) Reconsult RD for nutrition recommendations after swallow eval completed.    Monitor & Evaluate:  PO intake, tolerance to diet/supplement, nutrition related lab values, weight trends, BMs/GI distress, hydration status, skin integrity.    Lexis Cano MS, RDN (Pager #[phone]) | Also available on TEAMS

## 2022-12-08 NOTE — CONSULT NOTE ADULT - CONVERSATION DETAILS
Referral to palliative care for complex decision making and symptom management in setting of advanced illness. Patients chart reviewed. Pt assessed at bedside, Alert x2 confused. MOLST filled out by primary team, pt DNR/DNI. No HCP on file. Called daughter Tia to elicit further goals of care. Tia shared that after her moms last admission to the hospital 11/22-11/26 the family (pt and son Felix) spoke about the role of placing a feeding tube if the pt continued to decline, at that time both Tia & Felix were in agreement for feeding tube. Tia shared that she and Felix are making all medical decisions together and would like to set up a family meeting with both of them present to discuss possible placement of feeding tube. Will reach out to the family tomorrow. Palliative care will continue to follow.

## 2022-12-08 NOTE — CONSULT NOTE ADULT - ASSESSMENT
82F with PMH HTN, HLD, hyperthyroidism (untreated), recent hospitalization 11/22-11/26 for dysphagia and weakness found to have multinodular goiter presents with generalized weakness and poor PO intake. Family reported after patient left the hospital she was eating and drinking very little. Over the last week pt has become more lethargic and walking around less. Prior to previous hospitalization pt was independent and working. Family brought pt to hospital because they were worried about mental status and poor PO intake. No recent changes in medications. Had 1x episode of diarrhea prior to hospitalization. Pt endorsed SOB. Denied chest pain, n/v, fever, chills, cough.      ED VS: febrile 101.4, HR 120s, /77, placed on HFNC at 50/50. Labs significant for Na 159, BUN 52, corrected Ca 11.7, and SCr 1.47. CT head negative. CT angio abd showed nonspecific enterocolitis with wall thickening of the descending and sigmoid segments of the colon and bilateral dependent pneumonia in the lower lobes. Received 1u pRBCs due to concern for GI bleed (blood on thermometer when taking rectal temp). Received tylenol, cefepime, vancomycin. Pt became persistently hypotensive to SBP 80s and started on levophed which was weaned off after 2L IVF. Palliative care consulted for complex decision making in the setting of advanced illness.

## 2022-12-08 NOTE — CONSULT NOTE ADULT - SUBJECTIVE AND OBJECTIVE BOX
[facility] Geriatrics and Palliative Care  Alejandra Heck, Palliative Care Nurse Practitioner  Contact Info: Page [phone] (Including Nights/Weekends), Message on Microsoft Teams (Alejandra Heck), or leave VM at Palliative Office [phone] (non-urgent)    HPI:  82F with PMH HTN, HLD, hyperthyroidism (untreated), recent hospitalization 11/22-11/26 for dysphagia and weakness found to have multinodular goiter presents with generalized weakness and poor PO intake. Family reported after patient left the hospital she was eating and drinking very little. Over the last week pt has become more lethargic and walking around less. Prior to previous hospitalization pt was independent and working. Family brought pt to hospital because they were worried about mental status and poor PO intake. No recent changes in medications. Had 1x episode of diarrhea prior to hospitalization. Pt endorsed SOB. Denied chest pain, n/v, fever, chills, cough.      ED VS: febrile 101.4, HR 120s, /77, placed on HFNC at 50/50. Labs significant for Na 159, BUN 52, corrected Ca 11.7, and SCr 1.47. CT head negative. CT angio abd showed nonspecific enterocolitis with wall thickening of the descending and sigmoid segments of the colon and bilateral dependent pneumonia in the lower lobes. Received 1u pRBCs due to concern for GI bleed (blood on thermometer when taking rectal temp). Received tylenol, cefepime, vancomycin. Pt became persistently hypotensive to SBP 80s and started on levophed which was weaned off after 2L IVF.  (04 Dec 2022 02:17)    PERTINENT PM/SXH:   HTN (hypertension)    HLD (hyperlipidemia)      No significant past surgical history      FAMILY HISTORY:  No pertinent family history in first degree relatives      ------------------------------------------------------------------------------------------------------------  ITEMS NOT CHECKED ARE NOT PRESENT    SOCIAL HISTORY:   Living Situation: [ ]Home  [ ]Long term care  [ ]Rehab [ ]Other  Support:   Substance hx:  [ ]   Tobacco hx:  [ ]   Alcohol hx: [ ]   Family Hx substance abuse [ ]yes [ ]no  [place]/Spirituality:    PCSSQ[Palliative Care Spiritual Screening Question]   Severity (0-10): 0  Score of 4 or > indicate consideration of Chaplaincy referral.  Chaplaincy Referral: [ ] yes [X ] refused [ ] following    Caregiver [place]? : [ ] yes [X ] no [ ] Deferred [ ] Declined               Social work referral [ ] Patient & Family Centered Care Referral [ ]    Anticipatory Grief present?:  [ ] yes [X ] no  [ ] Deferred                    Social work referral [ ] Patient & Family Centered Care Referral [ ]    ------------------------------------------------------------------------------------------------------------    PRESENT SYMPTOMS:  [ ] No     [ ] Yes   Source if other than patient:   [ ]Family   [ ]Team     [ ] Unable to self-report      [ ] CPOT (ICU)     [ ] PAINADs     [ ] RDOS    PAIN:   If blank, patient unable to specify   [ ]yes [ ]no  Location -                    Aggravating factors -  Quality -  Radiation -  Timing-  Pain at most severe level (0-10 scale):  Pain at minimal acceptable level (0-10 scale):     Dyspnea:                           [ ]Mild [ ]Moderate [ ]Severe  Anxiety:                             [ ]Mild [ ]Moderate [ ]Severe  Fatigue:                             [ ]Mild [ ]Moderate [ ]Severe  Nausea:                             [ ]Mild [ ]Moderate [ ]Severe  Loss of appetite:              [ ]Mild [ ]Moderate [ ]Severe  Constipation:                    [ ]Mild [ ]Moderate [ ]Severe    Other Symptoms:  [ ]All other review of systems negative     Home Medications for symptoms if any:  I-Stop Reference No:      ------------------------------------------------------------------------------------------------------------    FUNCTIONAL STATUS:     Baseline ADL (prior to admission):  [ ] Independent  [ ] Moderate Assistance [ ] Dependent  Palliative Performance Score:     [ ]PPSV2 < or = to 30%     NUTRITIONAL STATUS:     Protein Calorie Malnutrition Present:   [ ]mild   [ ]moderate   [ ]severe   [ ]poor nutritional intake   [ ]artificial nutrition      Weight:   [ ]underweight (BMI 18.5 or less)   [ ]morbid obesity (BMI 30 or higher)   [ ]anasarca  [ ]significant weight loss     Height (cm): 167.6 (12-05-22 @ 04:40), 160 (11-23-22 @ 04:33)  Weight (kg): 49.7 (12-05-22 @ 04:40), 51.71 (11-23-22 @ 04:33)  BMI (kg/m2): 17.7 (12-05-22 @ 04:40), 20.2 (11-23-22 @ 04:33)    ------------------------------------------------------------------------------------------------------------    PRIOR ADVANCE DIRECTIVES:    [ ] DNR/MOLST    [ ] Living Will    [ ] Health Care Proxy(s)    DECISION MAKER(s):  [ ] Patient    [ ] Surrogate(s)  [ ]  HCP   [ ] Guardian           Name(s):     ------------------------------------------------------------------------------------------------------------  PHYSICAL EXAM:  Vital Signs Last 24 Hrs  T(C): 36.3 (08 Dec 2022 04:54), Max: 36.3 (07 Dec 2022 20:55)  T(F): 97.4 (08 Dec 2022 04:54), Max: 97.4 (08 Dec 2022 04:54)  HR: 65 (08 Dec 2022 12:01) (55 - 69)  BP: 131/61 (08 Dec 2022 04:54) (131/61 - 155/73)  BP(mean): --  RR: 20 (08 Dec 2022 12:01) (18 - 20)  SpO2: 95% (08 Dec 2022 12:01) (91% - 95%)    Parameters below as of 08 Dec 2022 12:01  Patient On (Oxygen Delivery Method): nasal cannula, high flow  O2 Flow (L/min): 50  O2 Concentration (%): 60 I&O's Summary      GENERAL:  [ ]Cachexia  [ ] Frail  [ ]Awake  [ ]Oriented x   [ ]Lethargic  [ ]Unarousable  [ ]Verbal  [ ]Non-Verbal    BEHAVIORAL: [ ] Anxiety  [ ] Delirium [ ] Agitation [ ] Other    HEENT: [ ]Normal   [ ]Dry mouth   [ ]ET Tube/Trach  [ ]Oral lesions    PULMONARY:   [ ]Clear [ ]Tachypnea  [ ]Audible excessive secretions   [ ]Rhonchi        [ ]Right [ ]Left [ ]Bilateral  [ ]Crackles        [ ]Right [ ]Left [ ]Bilateral  [ ]Wheezing     [ ]Right [ ]Left [ ]Bilateral  [ ]Diminished breath sounds [ ]right [ ]left [ ]bilateral    CARDIOVASCULAR:    [ ]Regular [ ]Irregular [ ]Tachy  [ ]Christopher [ ]Murmur [ ]Other    GASTROINTESTINAL:  [ ]Soft  [ ]Distended   [ ]+BS  [ ]Non tender [ ]Tender  [ ]Other [ ]PEG [ ]OGT/ NGT  Last BM:    GENITOURINARY:  [ ]Normal [ ] Incontinent   [ ]Oliguria/Anuria   [ ]Plaza    MUSCULOSKELETAL:   [ ]Normal   [ ]Weakness  [ ]Bed/Wheelchair bound [ ]Edema    NEUROLOGIC:   [ ]No focal deficits  [ ]Cognitive impairment  [ ]Dysphagia [ ]Dysarthria [ ]Paresis [ ]Other     SKIN:   [ ]Normal  [ ]Rash  [ ]Other  [ ]Pressure ulcer(s)       Present on admission [ ]y [ ]n    ------------------------------------------------------------------------------------------------------------    LABS:                        13.4   6.31  )-----------( 83       ( 08 Dec 2022 06:40 )             40.2   12-08    142  |  104  |  23  ----------------------------<  216<H>  2.8<LL>   |  27  |  0.46<L>    Ca    6.9<L>      08 Dec 2022 06:40  Phos  1.6     12-08  Mg     1.50     12-08    TPro  4.5<L>  /  Alb  1.7<L>  /  TBili  0.6  /  DBili  0.3  /  AST  65<H>  /  ALT  52<H>  /  AlkPhos  131<H>  12-08      ------------------------------------------------------------------------------------------------------------    RADIOLOGY & ADDITIONAL STUDIES: < from: CT Head No Cont (12.03.22 @ 19:30) >  IMPRESSION:  No acute intracranial hemorrhage or mass effect.  Age-related involutional changes and chronic small vessel ischemic   disease.    --- End of Report ---    ------------------------------------------------------------------------------------------------------------    ALLERGIES:  Allergies    fish (Other)  penicillin (Hives)    Intolerances      MEDICATIONS  (STANDING):  cefepime   IVPB 1000 milliGRAM(s) IV Intermittent every 12 hours  dexAMETHasone  Injectable 6 milliGRAM(s) IV Push daily  dextrose 5% + lactated ringers. 1000 milliLiter(s) (65 mL/Hr) IV Continuous <Continuous>  heparin   Injectable 5000 Unit(s) SubCutaneous every 8 hours  metroNIDAZOLE  IVPB 500 milliGRAM(s) IV Intermittent every 8 hours  remdesivir  IVPB   IV Intermittent   remdesivir  IVPB 100 milliGRAM(s) IV Intermittent every 24 hours    MEDICATIONS  (PRN):      ------------------------------------------------------------------------------------------------------------    CRITICAL CARE:  [ ] Shock Present  [ ]Septic [ ]Cardiogenic [ ]Neurologic [ ]Hypovolemic  [ ]  Vasopressors [ ]  Inotropes   [ ]Respiratory failure present [ ]Mechanical ventilation [ ]Non-invasive ventilatory support [ ]High flow    [ ]Acute  [ ]Chronic [ ]Hypoxic  [ ]Hypercarbic [ ]Other  [ ]Other organ failure     Other REFERRALS:  [ ]Hospice  [ ]Child Life  [ ]Social Work  [ ]Case management [ ]Holistic Therapy    [facility] Geriatrics and Palliative Care  Alejandra Heck, Palliative Care Nurse Practitioner  Contact Info: Page [phone] (Including Nights/Weekends), Message on Microsoft Teams (Alejandra Heck), or leave VM at Palliative Office [phone] (non-urgent)    HPI:  82F with PMH HTN, HLD, hyperthyroidism (untreated), recent hospitalization 11/22-11/26 for dysphagia and weakness found to have multinodular goiter presents with generalized weakness and poor PO intake. Family reported after patient left the hospital she was eating and drinking very little. Over the last week pt has become more lethargic and walking around less. Prior to previous hospitalization pt was independent and working. Family brought pt to hospital because they were worried about mental status and poor PO intake. No recent changes in medications. Had 1x episode of diarrhea prior to hospitalization. Pt endorsed SOB. Denied chest pain, n/v, fever, chills, cough.      ED VS: febrile 101.4, HR 120s, /77, placed on HFNC at 50/50. Labs significant for Na 159, BUN 52, corrected Ca 11.7, and SCr 1.47. CT head negative. CT angio abd showed nonspecific enterocolitis with wall thickening of the descending and sigmoid segments of the colon and bilateral dependent pneumonia in the lower lobes. Received 1u pRBCs due to concern for GI bleed (blood on thermometer when taking rectal temp). Received tylenol, cefepime, vancomycin. Pt became persistently hypotensive to SBP 80s and started on levophed which was weaned off after 2L IVF.  (04 Dec 2022 02:17)    PERTINENT PM/SXH:   HTN (hypertension)    HLD (hyperlipidemia)      No significant past surgical history      FAMILY HISTORY:  No pertinent family history in first degree relatives      ------------------------------------------------------------------------------------------------------------  ITEMS NOT CHECKED ARE NOT PRESENT    SOCIAL HISTORY:   Living Situation: [X ]Home  [ ]Long term care  [ ]Rehab [ ]Other  Support:   Substance hx:  [ ]   Tobacco hx:  [ ]   Alcohol hx: [ ]   Family Hx substance abuse [ ]yes [ ]no  [employer]/Spirituality:    PCSSQ[Palliative Care Spiritual Screening Question]   Severity (0-10): 0  Score of 4 or > indicate consideration of Chaplaincy referral.  Chaplaincy Referral: [ ] yes [X ] refused [ ] following    Caregiver [place]? : [ ] yes [X ] no [ ] Deferred [ ] Declined               Social work referral [ ] Patient & Family Centered Care Referral [ ]    Anticipatory Grief present?:  [ ] yes [X ] no  [ ] Deferred                    Social work referral [ ] Patient & Family Centered Care Referral [ ]    ------------------------------------------------------------------------------------------------------------    PRESENT SYMPTOMS:  [ ] No     [x ] Yes   Source if other than patient:   [x ]Family   [ ]Team     [ x] Unable to self-report      [ ] CPOT (ICU)     [X ] PAINADs     [ ] RDOS    PAIN:   If blank, patient unable to specify   [ ]yes [ ]no  Location -                    Aggravating factors -  Quality -  Radiation -  Timing-  Pain at most severe level (0-10 scale):  Pain at minimal acceptable level (0-10 scale):     Dyspnea:                           [ ]Mild [ ]Moderate [ ]Severe  Anxiety:                             [ ]Mild [ ]Moderate [ ]Severe  Fatigue:                             [ ]Mild [X ]Moderate [ ]Severe  Nausea:                             [ ]Mild [ ]Moderate [ ]Severe  Loss of appetite:              [ ]Mild [ ]Moderate [X ]Severe  Constipation:                    [ ]Mild [ ]Moderate [ ]Severe    Other Symptoms:  [ ]All other review of systems negative     Home Medications for symptoms if any:  I-Stop Reference No:      ------------------------------------------------------------------------------------------------------------    FUNCTIONAL STATUS:     Baseline ADL (prior to admission):  [ ] Independent  [ ] Moderate Assistance [ ] Dependent  Palliative Performance Score:     [ ]PPSV2 < or = to 30%     NUTRITIONAL STATUS:     Protein Calorie Malnutrition Present:   [ ]mild   [ ]moderate   [ ]severe   [ ]poor nutritional intake   [ ]artificial nutrition      Weight:   [ ]underweight (BMI 18.5 or less)   [ ]morbid obesity (BMI 30 or higher)   [ ]anasarca  [ ]significant weight loss     Height (cm): 167.6 (12-05-22 @ 04:40), 160 (11-23-22 @ 04:33)  Weight (kg): 49.7 (12-05-22 @ 04:40), 51.71 (11-23-22 @ 04:33)  BMI (kg/m2): 17.7 (12-05-22 @ 04:40), 20.2 (11-23-22 @ 04:33)    ------------------------------------------------------------------------------------------------------------    PRIOR ADVANCE DIRECTIVES:    [ ] DNR/MOLST    [ ] Living Will    [ ] Health Care Proxy(s)    DECISION MAKER(s):  [ ] Patient    [ ] Surrogate(s)  [ ]  HCP   [ ] Guardian           Name(s):     ------------------------------------------------------------------------------------------------------------  PHYSICAL EXAM:  Vital Signs Last 24 Hrs  T(C): 36.3 (08 Dec 2022 04:54), Max: 36.3 (07 Dec 2022 20:55)  T(F): 97.4 (08 Dec 2022 04:54), Max: 97.4 (08 Dec 2022 04:54)  HR: 65 (08 Dec 2022 12:01) (55 - 69)  BP: 131/61 (08 Dec 2022 04:54) (131/61 - 155/73)  BP(mean): --  RR: 20 (08 Dec 2022 12:01) (18 - 20)  SpO2: 95% (08 Dec 2022 12:01) (91% - 95%)    Parameters below as of 08 Dec 2022 12:01  Patient On (Oxygen Delivery Method): nasal cannula, high flow  O2 Flow (L/min): 50  O2 Concentration (%): 60 I&O's Summary      GENERAL:  [ ]Cachexia  [ ] Frail  [ ]Awake  [ ]Oriented x   [ ]Lethargic  [ ]Unarousable  [ ]Verbal  [ ]Non-Verbal    BEHAVIORAL: [ ] Anxiety  [ ] Delirium [ ] Agitation [ ] Other    HEENT: [ ]Normal   [ ]Dry mouth   [ ]ET Tube/Trach  [ ]Oral lesions    PULMONARY:   [ ]Clear [ ]Tachypnea  [ ]Audible excessive secretions   [ ]Rhonchi        [ ]Right [ ]Left [ ]Bilateral  [ ]Crackles        [ ]Right [ ]Left [ ]Bilateral  [ ]Wheezing     [ ]Right [ ]Left [ ]Bilateral  [ ]Diminished breath sounds [ ]right [ ]left [ ]bilateral    CARDIOVASCULAR:    [ ]Regular [ ]Irregular [ ]Tachy  [ ]Christopher [ ]Murmur [ ]Other    GASTROINTESTINAL:  [ ]Soft  [ ]Distended   [ ]+BS  [ ]Non tender [ ]Tender  [ ]Other [ ]PEG [ ]OGT/ NGT  Last BM:    GENITOURINARY:  [ ]Normal [ ] Incontinent   [ ]Oliguria/Anuria   [ ]Plaza    MUSCULOSKELETAL:   [ ]Normal   [ ]Weakness  [ ]Bed/Wheelchair bound [ ]Edema    NEUROLOGIC:   [ ]No focal deficits  [ ]Cognitive impairment  [ ]Dysphagia [ ]Dysarthria [ ]Paresis [ ]Other     SKIN:   [ ]Normal  [ ]Rash  [ ]Other  [ ]Pressure ulcer(s)       Present on admission [ ]y [ ]n    ------------------------------------------------------------------------------------------------------------    LABS:                        13.4   6.31  )-----------( 83       ( 08 Dec 2022 06:40 )             40.2   12-08    142  |  104  |  23  ----------------------------<  216<H>  2.8<LL>   |  27  |  0.46<L>    Ca    6.9<L>      08 Dec 2022 06:40  Phos  1.6     12-08  Mg     1.50     12-08    TPro  4.5<L>  /  Alb  1.7<L>  /  TBili  0.6  /  DBili  0.3  /  AST  65<H>  /  ALT  52<H>  /  AlkPhos  131<H>  12-08      ------------------------------------------------------------------------------------------------------------    RADIOLOGY & ADDITIONAL STUDIES: < from: CT Head No Cont (12.03.22 @ 19:30) >  IMPRESSION:  No acute intracranial hemorrhage or mass effect.  Age-related involutional changes and chronic small vessel ischemic   disease.    --- End of Report ---    ------------------------------------------------------------------------------------------------------------    ALLERGIES:  Allergies    fish (Other)  penicillin (Hives)    Intolerances      MEDICATIONS  (STANDING):  cefepime   IVPB 1000 milliGRAM(s) IV Intermittent every 12 hours  dexAMETHasone  Injectable 6 milliGRAM(s) IV Push daily  dextrose 5% + lactated ringers. 1000 milliLiter(s) (65 mL/Hr) IV Continuous <Continuous>  heparin   Injectable 5000 Unit(s) SubCutaneous every 8 hours  metroNIDAZOLE  IVPB 500 milliGRAM(s) IV Intermittent every 8 hours  remdesivir  IVPB   IV Intermittent   remdesivir  IVPB 100 milliGRAM(s) IV Intermittent every 24 hours    MEDICATIONS  (PRN):      ------------------------------------------------------------------------------------------------------------    CRITICAL CARE:  [ ] Shock Present  [ ]Septic [ ]Cardiogenic [ ]Neurologic [ ]Hypovolemic  [ ]  Vasopressors [ ]  Inotropes   [ ]Respiratory failure present [ ]Mechanical ventilation [ ]Non-invasive ventilatory support [ ]High flow    [ ]Acute  [ ]Chronic [ ]Hypoxic  [ ]Hypercarbic [ ]Other  [ ]Other organ failure     Other REFERRALS:  [ ]Hospice  [ ]Child Life  [ ]Social Work  [ ]Case management [ ]Holistic Therapy    [facility] Geriatrics and Palliative Care  Alejandra Heck, Palliative Care Nurse Practitioner  Contact Info: Page [phone] (Including Nights/Weekends), Message on Microsoft Teams (Alejandra Heck), or leave VM at Palliative Office [phone] (non-urgent)    HPI:  82F with PMH HTN, HLD, hyperthyroidism (untreated), recent hospitalization 11/22-11/26 for dysphagia and weakness found to have multinodular goiter presents with generalized weakness and poor PO intake. Family reported after patient left the hospital she was eating and drinking very little. Over the last week pt has become more lethargic and walking around less. Prior to previous hospitalization pt was independent and working. Family brought pt to hospital because they were worried about mental status and poor PO intake. No recent changes in medications. Had 1x episode of diarrhea prior to hospitalization. Pt endorsed SOB. Denied chest pain, n/v, fever, chills, cough.      ED VS: febrile 101.4, HR 120s, /77, placed on HFNC at 50/50. Labs significant for Na 159, BUN 52, corrected Ca 11.7, and SCr 1.47. CT head negative. CT angio abd showed nonspecific enterocolitis with wall thickening of the descending and sigmoid segments of the colon and bilateral dependent pneumonia in the lower lobes. Received 1u pRBCs due to concern for GI bleed (blood on thermometer when taking rectal temp). Received tylenol, cefepime, vancomycin. Pt became persistently hypotensive to SBP 80s and started on levophed which was weaned off after 2L IVF.  (04 Dec 2022 02:17)    PERTINENT PM/SXH:   HTN (hypertension)    HLD (hyperlipidemia)    No significant past surgical history    FAMILY HISTORY:  No pertinent family history in first degree relatives    ------------------------------------------------------------------------------------------------------------  ITEMS NOT CHECKED ARE NOT PRESENT    SOCIAL HISTORY:   Living Situation: [X ]Home  [ ]Long term care  [ ]Rehab [ ]Other  Support:   Substance hx:  [ ]   Tobacco hx:  [ ]   Alcohol hx: [ ]   Family Hx substance abuse [ ]yes [ ]no  [employer]/Spirituality:    PCSSQ[Palliative Care Spiritual Screening Question]   Severity (0-10): 0  Score of 4 or > indicate consideration of Chaplaincy referral.  Chaplaincy Referral: [ ] yes [X ] refused [ ] following    Caregiver [place]? : [ ] yes [X ] no [ ] Deferred [ ] Declined               Social work referral [ ] Patient & Family Centered Care Referral [ ]    Anticipatory Grief present?:  [ ] yes [X ] no  [ ] Deferred                    Social work referral [ ] Patient & Family Centered Care Referral [ ]    ------------------------------------------------------------------------------------------------------------    PRESENT SYMPTOMS:  [ ] No     [x ] Yes   Source if other than patient:   [x ]Family   [ ]Team     [ x] Unable to self-report      [ ] CPOT (ICU)     [X ] PAINADs     [ ] RDOS    PAIN:   If blank, patient unable to specify   [ ]yes [ ]no  Location -                    Aggravating factors -  Quality -  Radiation -  Timing-  Pain at most severe level (0-10 scale):  Pain at minimal acceptable level (0-10 scale):     Dyspnea:                           [ ]Mild [ ]Moderate [ ]Severe  Anxiety:                             [ ]Mild [ ]Moderate [ ]Severe  Fatigue:                             [ ]Mild [X ]Moderate [ ]Severe  Nausea:                             [ ]Mild [ ]Moderate [ ]Severe  Loss of appetite:              [ ]Mild [ ]Moderate [X ]Severe  Constipation:                    [ ]Mild [ ]Moderate [ ]Severe    Other Symptoms:  [ ]All other review of systems negative     Home Medications for symptoms if any:  I-Stop Reference No:      ------------------------------------------------------------------------------------------------------------    FUNCTIONAL STATUS:     Baseline ADL (prior to admission):  [ ] Independent  [ x] Moderate Assistance [ ] Dependent  Palliative Performance Score:     [ ]PPSV2 < or = to 30%     NUTRITIONAL STATUS:     Protein Calorie Malnutrition Present:   [ ]mild   [ ]moderate   [ ]severe   [ ]poor nutritional intake   [ ]artificial nutrition      Weight:   [ ]underweight (BMI 18.5 or less)   [ ]morbid obesity (BMI 30 or higher)   [ ]anasarca  [ ]significant weight loss     Height (cm): 167.6 (12-05-22 @ 04:40), 160 (11-23-22 @ 04:33)  Weight (kg): 49.7 (12-05-22 @ 04:40), 51.71 (11-23-22 @ 04:33)  BMI (kg/m2): 17.7 (12-05-22 @ 04:40), 20.2 (11-23-22 @ 04:33)    ------------------------------------------------------------------------------------------------------------    PRIOR ADVANCE DIRECTIVES:    [x ] DNR/MOLST    [ ] Living Will    [ ] Health Care Proxy(s)    DECISION MAKER(s):  [ ] Patient    [x ] Surrogate(s)  [ ]  HCP   [ ] Guardian           Name(s):     ------------------------------------------------------------------------------------------------------------  PHYSICAL EXAM:  Vital Signs Last 24 Hrs  T(C): 36.3 (08 Dec 2022 04:54), Max: 36.3 (07 Dec 2022 20:55)  T(F): 97.4 (08 Dec 2022 04:54), Max: 97.4 (08 Dec 2022 04:54)  HR: 65 (08 Dec 2022 12:01) (55 - 69)  BP: 131/61 (08 Dec 2022 04:54) (131/61 - 155/73)  BP(mean): --  RR: 20 (08 Dec 2022 12:01) (18 - 20)  SpO2: 95% (08 Dec 2022 12:01) (91% - 95%)    Parameters below as of 08 Dec 2022 12:01  Patient On (Oxygen Delivery Method): nasal cannula, high flow  O2 Flow (L/min): 50  O2 Concentration (%): 60 I&O's Summary      GENERAL:  [ ]Cachexia  [x ] Frail  [x ]Awake  [x ]Oriented x 1-2  [ ]Lethargic  [ ]Unarousable  [x ]Verbal  [ ]Non-Verbal    BEHAVIORAL: [ ] Anxiety  [ ] Delirium [ ] Agitation [ ] Other    HEENT: [ ]Normal   [x ]Dry mouth   [ ]ET Tube/Trach  [ ]Oral lesions    PULMONARY:   [ ]Clear [x ]Tachypnea: on high flow  [ ]Audible excessive secretions   [ ]Rhonchi        [ ]Right [ ]Left [ ]Bilateral  [ ]Crackles        [ ]Right [ ]Left [ ]Bilateral  [ ]Wheezing     [ ]Right [ ]Left [ ]Bilateral  [ ]Diminished breath sounds [ ]right [ ]left [ ]bilateral    CARDIOVASCULAR:    [ ]Regular [ ]Irregular [ ]Tachy  [ ]Christopher [ ]Murmur [ ]Other    GASTROINTESTINAL:  [ ]Soft  [ ]Distended   [ ]+BS  [x ]Non tender [ ]Tender  [ ]Other [ ]PEG [ ]OGT/ NGT  Last BM: 12/7    GENITOURINARY:  [ ]Normal [x ] Incontinent   [ ]Oliguria/Anuria   [ ]Plaza    MUSCULOSKELETAL:   [ ]Normal   [x ]Weakness  [ ]Bed/Wheelchair bound [ ]Edema    NEUROLOGIC:   [ ]No focal deficits  [ x]Cognitive impairment  [ ]Dysphagia [ ]Dysarthria [ ]Paresis [ ]Other     SKIN:   [ x]Normal  [ ]Rash  [ ]Other  [ ]Pressure ulcer(s)       Present on admission [ ]y [ ]n    ------------------------------------------------------------------------------------------------------------    LABS:                        13.4   6.31  )-----------( 83       ( 08 Dec 2022 06:40 )             40.2   12-08    142  |  104  |  23  ----------------------------<  216<H>  2.8<LL>   |  27  |  0.46<L>    Ca    6.9<L>      08 Dec 2022 06:40  Phos  1.6     12-08  Mg     1.50     12-08    TPro  4.5<L>  /  Alb  1.7<L>  /  TBili  0.6  /  DBili  0.3  /  AST  65<H>  /  ALT  52<H>  /  AlkPhos  131<H>  12-08      ------------------------------------------------------------------------------------------------------------    RADIOLOGY & ADDITIONAL STUDIES:   < from: CT Head No Cont (12.03.22 @ 19:30) >  IMPRESSION:  No acute intracranial hemorrhage or mass effect.  Age-related involutional changes and chronic small vessel ischemic   disease.    ------------------------------------------------------------------------------------------------------------    ALLERGIES:  Allergies    fish (Other)  penicillin (Hives)    Intolerances    MEDICATIONS  (STANDING):  cefepime   IVPB 1000 milliGRAM(s) IV Intermittent every 12 hours  dexAMETHasone  Injectable 6 milliGRAM(s) IV Push daily  dextrose 5% + lactated ringers. 1000 milliLiter(s) (65 mL/Hr) IV Continuous <Continuous>  heparin   Injectable 5000 Unit(s) SubCutaneous every 8 hours  metroNIDAZOLE  IVPB 500 milliGRAM(s) IV Intermittent every 8 hours  remdesivir  IVPB   IV Intermittent   remdesivir  IVPB 100 milliGRAM(s) IV Intermittent every 24 hours    MEDICATIONS  (PRN):      ------------------------------------------------------------------------------------------------------------    CRITICAL CARE:  [ ] Shock Present  [ ]Septic [ ]Cardiogenic [ ]Neurologic [ ]Hypovolemic  [ ]  Vasopressors [ ]  Inotropes   [ ]Respiratory failure present [ ]Mechanical ventilation [ ]Non-invasive ventilatory support [ ]High flow    [ ]Acute  [ ]Chronic [ ]Hypoxic  [ ]Hypercarbic [ ]Other  [ ]Other organ failure     Other REFERRALS:  [ ]Hospice  [ ]Child Life  [ ]Social Work  [ ]Case management [ ]Holistic Therapy

## 2022-12-08 NOTE — CONSULT NOTE ADULT - PROBLEM SELECTOR RECOMMENDATION 9
Pt with poor PO intake. Daughter Tia shared that this has been the patients baseline for a couple of months now. With a recent hospitalization 11/22-11/26 for FTT.   - Family seems interested in PEG tube, pending further GOC and family meeting with both Tia & Felix.   - Pt refused to participate in bedside swallow evaluation.

## 2022-12-08 NOTE — CONSULT NOTE ADULT - PROBLEM SELECTOR RECOMMENDATION 4
SHANNAN by primary team- DNR/DNI  Attempted nutritional goals of care today, Tia (daughter) shared all medical decisions are made by herself and Felix (son). Felix was not available to participate in the conversation at the time.   Plan for family meeting with both Tia & Felix to elicit goals.

## 2022-12-08 NOTE — CONSULT NOTE ADULT - PROBLEM SELECTOR RECOMMENDATION 2
PPSV 50%  - PT eval recommends discharge to acute rehab.  - Turn & position Q2hrs to prevent skin breakdown

## 2022-12-08 NOTE — PROGRESS NOTE ADULT - PROBLEM SELECTOR PLAN 10
DVT ppx: heparin subq --> dc given dropping plts. CHECK HIT ab. (CT angio neg for PE)  Diet: NPO pending bedside dysphagia screen  Dispo: pending clinical resolution of acute issues    DNR/DNI

## 2022-12-08 NOTE — PROGRESS NOTE ADULT - SUBJECTIVE AND OBJECTIVE BOX
[facility] DIVISION OF INFECTIOUS DISEASES  JYOTI Dawson Y. Patel, S. Shah, G. Casimir  and providing coverage with Daren Coles MD  Providing Infectious Disease Consultations at [facility]'s      Office# [phone] to schedule follow up appointments  Answering Service for urgent calls or New Consults [phone]    Infectious Diseases Progress Note:  TIFFANY HU is a 82y year old Female patient    COVID-19 Patient  Lethargic but arousable  Denies pain, n/v, difficulty breathing  Remains on HFNC  Notes reviewed  No concerning events overnight.   Allergies: fish (Other)  penicillin (Hives)    ANTIBIOTICS/RELEVANT:  Current Antimicrobials:  cefepime   IVPB 1000 milliGRAM(s) IV Intermittent every 12 hours  metroNIDAZOLE  IVPB 500 milliGRAM(s) IV Intermittent every 8 hours  remdesivir  IVPB   IV Intermittent   remdesivir  IVPB 100 milliGRAM(s) IV Intermittent every 24 hours    Prior/Completed Antimicrobials:  azithromycin  IVPB  cefepime   IVPB  remdesivir  IVPB  vancomycin  IVPB.    Other Meds: dexAMETHasone  Injectable 6 milliGRAM(s) IV Push daily  dextrose 5% + lactated ringers. 1000 milliLiter(s) IV Continuous <Continuous>  heparin   Injectable 5000 Unit(s) SubCutaneous every 8 hours    Immunologic:   Objective:  Vital Signs Last 24 Hrs  T(F): 97.4 (08 Dec 2022 04:54), Max: 97.4 (08 Dec 2022 04:54)  HR: 65 (08 Dec 2022 12:01) (55 - 69)  BP: 131/61 (08 Dec 2022 04:54) (131/61 - 155/73)  RR: 20 (08 Dec 2022 12:01) (18 - 20)  SpO2: 95% (08 Dec 2022 12:01) (91% - 95%)  PHYSICAL EXAM:  General: no acute distress  HEENT: NC/AT, anicteric, supple  Respiratory: no acc muscle use, breathing comfortably  Cardiovascular: S1 S2 present, normal rate  Gastrointestinal: normal appearing, nondistended  Extremities: no edema, no cyanosis    LABS:                        13.4   6.31  )-----------( 83       ( 08 Dec 2022 06:40 )             40.2     WBC trend:  6.31 12-08 @ 06:40  9.03 12-07 @ 06:13  10.64 12-06 @ 06:10  11.53 12-05 @ 06:00  10.96 12-04 @ 07:05  8.00 12-03 @ 16:39  11.35 12-03 @ 15:07    12-08    142  |  104  |  23  ----------------------------<  216<H>  2.8<LL>   |  27  |  0.46<L>    Ca    6.9<L>      08 Dec 2022 06:40  Phos  1.6     12-08  Mg     1.50     12-08    TPro  4.5<L>  /  Alb  1.7<L>  /  TBili  0.6  /  DBili  0.3  /  AST  65<H>  /  ALT  52<H>  /  AlkPhos  131<H>  12-08    Creatinine, Serum: 0.46 mg/dL (12-08-22 @ 06:40)  Creatinine, Serum: 0.45 mg/dL (12-08-22 @ 06:40)  Creatinine, Serum: 0.54 mg/dL (12-07-22 @ 06:13)  Creatinine, Serum: 0.60 mg/dL (12-06-22 @ 13:45)  Creatinine, Serum: 0.66 mg/dL (12-06-22 @ 06:10)  Creatinine, Serum: 0.66 mg/dL (12-06-22 @ 06:10)  Creatinine, Serum: 0.70 mg/dL (12-05-22 @ 23:49)  Creatinine, Serum: 0.73 mg/dL (12-05-22 @ 17:20)  Creatinine, Serum: 0.87 mg/dL (12-05-22 @ 06:00)  Creatinine, Serum: 0.91 mg/dL (12-04-22 @ 21:37)  Creatinine, Serum: 0.99 mg/dL (12-04-22 @ 12:56)  Creatinine, Serum: 1.27 mg/dL (12-04-22 @ 07:05)  Creatinine, Serum: 1.35 mg/dL (12-04-22 @ 03:01)  Creatinine, Serum: 1.35 mg/dL (12-03-22 @ 16:39)  Creatinine, Serum: 1.47 mg/dL (12-03-22 @ 14:14)    Auto Neutrophil #: 10.67 K/uL (12-03-22 @ 15:07)  Auto Lymphocyte #: 0.23 K/uL (12-03-22 @ 15:07)    D-Dimer Assay, Quantitative: 2105 ng/mL DDU (12-04-22 @ 07:05)    Lactate, Blood: 1.7 mmol/L (12-05-22 @ 23:49)  Lactate, Blood: 2.3 mmol/L (12-05-22 @ 16:50)  Lactate, Blood: 2.3 mmol/L (12-05-22 @ 15:55)  Lactate, Blood: 2.1 mmol/L (12-05-22 @ 11:16)  Lactate, Blood: 3.6 mmol/L (12-05-22 @ 06:00)  Lactate, Blood: 2.8 mmol/L (12-04-22 @ 11:20)    Prothrombin Time, Plasma: 15.1 sec (12-03-22 @ 14:14)    Activated Partial Thromboplastin Time: 31.2 sec (12-03-22 @ 14:14)    MICROBIOLOGY: reviewed  Culture - Urine (collected 12-03-22 @ 14:26)  Source: Clean Catch Clean Catch (Midstream)  Final Report (12-04-22 @ 17:17):    <10,000 CFU/mL Normal Urogenital Shira    Culture - Blood (collected 12-03-22 @ 13:59)  Source: .Blood Blood-Peripheral  Preliminary Report (12-04-22 @ 19:02):    No growth to date.    Culture - Blood (collected 12-03-22 @ 13:55)  Source: .Blood Blood-Peripheral  Preliminary Report (12-04-22 @ 19:02):    No growth to date.    RADIOLOGY & ADDITIONAL STUDIES: reviewed

## 2022-12-08 NOTE — CONSULT NOTE ADULT - ATTENDING COMMENTS
82 yo with PMH as above, presenting with SOB, weakness, found to have hypoxic respiratory falilure 2/2 COVID PNA, febrile and hypotensive with melena; transiently requiring vasopressor but currently off after receiving 1U PRBC, midodrine, IVF.  She is awake, alert, in no resp distress on 50% high flow O2.  Lactate initially elevated but downtrending.  She was likely hypovolemic due to poor po intake and GI bleed, hypoxic 2/2 COVID PNA, but she has improved and does not require MICU level of care.  Confirmed DNR/DNI status, family at bedside
Pt seen and examined.  Agree with resident eval and plan.  Imp: Ischemic vs infectious colitis.  IV hydration and ICU management.  GI eval for possible colonoscopy.
Seen/staffed on 12/5    #CT findings c/f colitis: Possibly in setting of ischemia (may be in setting of shock +/- COVID infection) vs COVID-associated colitis vs alternate infection vs less likely inflammatory in etiology. No bleeding noted at this time and no diarrhea reported.   #COVID with acute hypoxic respiratory failure    --Stool studies if patient with diarrhea  --Maintain normotensive  --Can consider colonoscopy as outpatient to ensure resolution, but risks may outweigh benefits given overall GOC/code status and comorbidities  --Ongoing GOC; patient appears depressed during my discussions with feelings of hopelessness expressed and may benefit from BH evaluation    Additional recommendations as above
83 yo f dnr/i with septic shock briefly on pressors in setting of suspected PNA and colitis. COVID + on HFNC,  Hypernatremia. C/w broad spec abx , IVF as tolerated, npo pending surgical eval given ischemic bowel remains part of dd; follow official CT abd. Bedside and formal  swallow eval given possible aspiration. Decadron, D-dimer ordered to determine appropriate AC given covid +; remdesevir of renal function stable.

## 2022-12-08 NOTE — CONSULT NOTE ADULT - ASSESSMENT
82F with PMH HTN, HLD, hyperthyroidism (untreated), recent hospitalization 11/22-11/26 for dysphagia and weakness found to have multinodular goiter presents with generalized weakness likely 2/2 poor PO intake found to be septic and s/p short course of pressors for likely hypovolemic shock.      Covid pneumonia:   COPD  HTN :  HLD:   HYPERTHYROIDISM ;  Multinodular goiter  Thrombocytopenia:   HIgh  D DImer:       12/08:    Covid pneumonia: on remdesivir  and dexa: not acidotic : lts are dropping:  cont to monitor: on empiric antibiotics : on 60% fio2: d dimer is pretty high : repeat is pending:  d dimer was 4 days ago: Hard to AC her with decreasing platelets   COPD: start Symbicort and ventolin:  emphysema demonstrated on ct chest : not acidotic  HTN : controlled  HYPERTHYROIDISM ; per primary team : endo saw her before:   Multinodular goiter : per primary team  Thrombocytopenia: ? reason : send L[facility]TON:    DVT Prophylaxis:    DW ACP  : AND pmd

## 2022-12-09 NOTE — PROGRESS NOTE ADULT - SUBJECTIVE AND OBJECTIVE BOX
SUBJECTIVE/ OVERNIGHT EVENTS:  awake alert  now agreeable to speech re-eval  remain on hi-flow O2  CTa chest neg for PE.   weak and fatigue.  denied pain  d/w the daughter-in-law Sharon in details.  will try to wean hi-flow as tolerates  incentive spirometry ordered for atelectasis    --------------------------------------------------------------------------------------------  LABS:                        13.6   6.13  )-----------( 82       ( 09 Dec 2022 06:35 )             40.9     12-09    141  |  102  |  25<H>  ----------------------------<  197<H>  3.0<L>   |  29  |  0.45<L>    Ca    7.0<L>      09 Dec 2022 06:35  Phos  2.0     12-09  Mg     1.80     12-09    TPro  4.8<L>  /  Alb  1.9<L>  /  TBili  0.6  /  DBili  0.2  /  AST  66<H>  /  ALT  50<H>  /  AlkPhos  140<H>  12-09      CAPILLARY BLOOD GLUCOSE      POCT Blood Glucose.: 214 mg/dL (09 Dec 2022 17:59)  POCT Blood Glucose.: 196 mg/dL (09 Dec 2022 12:25)            RADIOLOGY & ADDITIONAL TESTS:    Imaging Personally Reviewed:  [x] YES  [ ] NO    Consultant(s) Notes Reviewed:  [x] YES  [ ] NO    MEDICATIONS  (STANDING):  budesonide 160 MICROgram(s)/formoterol 4.5 MICROgram(s) Inhaler 2 Puff(s) Inhalation two times a day  dexAMETHasone  Injectable 6 milliGRAM(s) IV Push daily  dextrose 5% + lactated ringers. 1000 milliLiter(s) (65 mL/Hr) IV Continuous <Continuous>  enoxaparin Injectable 40 milliGRAM(s) SubCutaneous every 24 hours    MEDICATIONS  (PRN):  albuterol    90 MICROgram(s) HFA Inhaler 2 Puff(s) Inhalation every 6 hours PRN Shortness of Breath and/or Wheezing      Care Discussed with Consultants/Other Providers [x] YES  [ ] NO    Vital Signs Last 24 Hrs  T(C): 36.2 (09 Dec 2022 19:52), Max: 36.4 (09 Dec 2022 08:10)  T(F): 97.1 (09 Dec 2022 19:52), Max: 97.6 (09 Dec 2022 08:10)  HR: 68 (09 Dec 2022 19:52) (64 - 71)  BP: 155/83 (09 Dec 2022 19:52) (127/75 - 155/83)  BP(mean): --  RR: 18 (09 Dec 2022 20:33) (18 - 20)  SpO2: 94% (09 Dec 2022 20:33) (91% - 97%)    Parameters below as of 09 Dec 2022 20:33  Patient On (Oxygen Delivery Method): nasal cannula, high flow  O2 Flow (L/min): 50  O2 Concentration (%): 55  I&O's Summary    08 Dec 2022 07:01  -  09 Dec 2022 07:00  --------------------------------------------------------  IN: 715 mL / OUT: 0 mL / NET: 715 mL          PHYSICAL EXAM:  GENERAL: NAD, ill-appearing, thin, cachetic, on hi-flow O2  HEAD:  Atraumatic, normocephalic  EYES: EOMI, PERRLA, conjunctiva and sclera clear  NECK: Supple, no JVD, no carotid bruits  HEART: Regular rate and rhythm, no murmurs, rubs, or gallops  CHEST/LUNG: mild decrease breath sounds bilaterally; No wheeze   ABDOMEN: Soft, nondistended, epigastric tenderness, no hepatosplenomegaly  EXTREMITIES: 2+ peripheral pulses bilaterally. No clubbing, cyanosis, or edema  NERVOUS SYSTEM:  A&Ox1-2, no focal posturing

## 2022-12-09 NOTE — GOALS OF CARE CONVERSATION - ADVANCED CARE PLANNING - CONVERSATION DETAILS
Referral to palliative care for complex decision making and symptom management in setting of advanced illness. Patients chart reviewed. Family shared that last admission pt was diagnosed with "mild Dementia" based on brain imaging. Patient was functioning independently and going to her doctors apt by herself, never had a formal assessment by PCP for dementia. Family endorsed noticing memory loss and "strategy approach changes" over the past year and a half. Nutritional status has also been declining over the past 6 months but significantly over the last month with a recent admission 11/22-11/26 for FTT and weakness. Since discharge family shared that she has eaten minimal with the fear of becoming nauseous/vomiting/choking. Discussed the role of trialing medications (Zofran) to combat these symptoms.   Additionally reviewed the trajectory of dementia and the impact the disease has on nutrition. Reviewed the risk and benefits of artificial nutrition in the setting of dementia. Explained that a PEG does not eliminate  the risk of aspiration and may not improve pt's quality of life. Discussed the recommendation for pleasure feeds. Family are struggling knowing that pt will likely take in minimal nutrition/calories however do feel that pt ultimately would not want a feeding tube. Family have arranged to be present for bedside swallow eval on Saturday and agree to follow up with palliative care team to further discuss goals of care.   Reaffirmed DNR/DNI  Emotional support provided.

## 2022-12-09 NOTE — PROGRESS NOTE ADULT - PROBLEM SELECTOR PLAN 8
TSH 0.15, T4 535, T3 67  - multinodular goiter found on last admission   - endo on last admission rec'd to avoid contrast studies, had CT angio this admission  - endo c/s sent to [email or web address]  - no clinical signs of hyperthyroidism, no need for beta-blockade or methimazole for now

## 2022-12-09 NOTE — PROGRESS NOTE ADULT - PROBLEM SELECTOR PLAN 1
T 101.4, HR 120s. GI and PNA possible sources  - s/p vanc and cefepime in ED  - CT angio abd showed nonspecific enterocolitis with wall thickening of the descending and sigmoid segments of the colon and bilateral dependent pneumonia in the lower lobes  - COVID positive, refer to COVID section for plan  - send GI PCR, stool culture, and stool ova and parasites if she has diarrhea  - initially received vanco (12/3- ), cefepime (12/3- ), azithro (12/4- )  - cont on cefepime; metronidazole added 12/5/22, completed abx course 12/8/22  - blood/urine cxs 12/3 (-)  - Legionella Ur Ag 12/4 (-)  - MRSA PCR 12/4 (+)

## 2022-12-09 NOTE — PROGRESS NOTE ADULT - PROBLEM SELECTOR PLAN 10
DVT ppx: heparin subq --> dc given dropping plts. HIT ab sent: negative. (CT angio neg for PE)  restart DVT ppx with LOVENOX 40 mg qdaily  Diet: NPO pending bedside dysphagia screen (refusing, now agreeable)     DNR/DNI

## 2022-12-09 NOTE — PROGRESS NOTE ADULT - SUBJECTIVE AND OBJECTIVE BOX
[facility] DIVISION OF INFECTIOUS DISEASES  JYOTI Dawson Y. Patel, ELOISE Colmenares  and providing coverage with Daren Coles MD  Providing Infectious Disease Consultations at [facility]'s      Office# [phone] to schedule follow up appointments  Answering Service for urgent calls or New Consults [phone]    Infectious Diseases Progress Note:  TIFFANY HU is a 82y year old Female patient    COVID-19 Patient  Remains on HFNC  Notes reviewed   Allergies: fish (Other)  penicillin (Hives)    ANTIBIOTICS/RELEVANT:  Current Antimicrobials:   Prior/Completed Antimicrobials:  azithromycin  IVPB  cefepime   IVPB  remdesivir  IVPB  vancomycin  IVPB.    Other Meds: albuterol    90 MICROgram(s) HFA Inhaler 2 Puff(s) Inhalation every 6 hours PRN  budesonide 160 MICROgram(s)/formoterol 4.5 MICROgram(s) Inhaler 2 Puff(s) Inhalation two times a day  dexAMETHasone  Injectable 6 milliGRAM(s) IV Push daily  dextrose 5% + lactated ringers. 1000 milliLiter(s) IV Continuous <Continuous>  potassium chloride  10 mEq/100 mL IVPB 10 milliEquivalent(s) IV Intermittent every 1 hour    Immunologic:   Objective:  Vital Signs Last 24 Hrs  T(F): 97.6 (09 Dec 2022 08:10), Max: 97.6 (09 Dec 2022 08:10)  HR: 64 (09 Dec 2022 08:10) (63 - 82)  BP: 147/83 (09 Dec 2022 08:10) (127/75 - 157/82)  RR: 20 (09 Dec 2022 08:36) (19 - 20)  SpO2: 95% (09 Dec 2022 08:36) (92% - 97%)  PHYSICAL EXAM:  General: no acute distress, HFNC  HEENT: NC/AT, anicteric, supple  Respiratory: no acc muscle use, breathing comfortably  Cardiovascular: S1 S2 present, normal rate  Gastrointestinal: normal appearing, nondistended  Extremities: no edema, no cyanosis    LABS:                        13.6   6.13  )-----------( 82       ( 09 Dec 2022 06:35 )             40.9     WBC trend:  6.13 12-09 @ 06:35  5.39 12-08 @ 17:29  6.31 12-08 @ 06:40  9.03 12-07 @ 06:13  10.64 12-06 @ 06:10  11.53 12-05 @ 06:00  10.96 12-04 @ 07:05  8.00 12-03 @ 16:39  11.35 12-03 @ 15:07    12-09    141  |  102  |  25<H>  ----------------------------<  197<H>  3.0<L>   |  29  |  0.45<L>    Ca    7.0<L>      09 Dec 2022 06:35  Phos  2.0     12-09  Mg     1.80     12-09    TPro  4.8<L>  /  Alb  1.9<L>  /  TBili  0.6  /  DBili  0.2  /  AST  66<H>  /  ALT  50<H>  /  AlkPhos  140<H>  12-09    Creatinine, Serum: 0.45 mg/dL (12-09-22 @ 06:35)  Creatinine, Serum: 0.45 mg/dL (12-08-22 @ 17:29)  Creatinine, Serum: 0.46 mg/dL (12-08-22 @ 06:40)  Creatinine, Serum: 0.45 mg/dL (12-08-22 @ 06:40)  Creatinine, Serum: 0.54 mg/dL (12-07-22 @ 06:13)  Creatinine, Serum: 0.60 mg/dL (12-06-22 @ 13:45)  Creatinine, Serum: 0.66 mg/dL (12-06-22 @ 06:10)  Creatinine, Serum: 0.66 mg/dL (12-06-22 @ 06:10)  Creatinine, Serum: 0.70 mg/dL (12-05-22 @ 23:49)  Creatinine, Serum: 0.73 mg/dL (12-05-22 @ 17:20)  Creatinine, Serum: 0.87 mg/dL (12-05-22 @ 06:00)  Creatinine, Serum: 0.91 mg/dL (12-04-22 @ 21:37)  Creatinine, Serum: 0.99 mg/dL (12-04-22 @ 12:56)  Creatinine, Serum: 1.27 mg/dL (12-04-22 @ 07:05)  Creatinine, Serum: 1.35 mg/dL (12-04-22 @ 03:01)  Creatinine, Serum: 1.35 mg/dL (12-03-22 @ 16:39)  Creatinine, Serum: 1.47 mg/dL (12-03-22 @ 14:14)    Auto Neutrophil #: 10.67 K/uL (12-03-22 @ 15:07)  Auto Lymphocyte #: 0.23 K/uL (12-03-22 @ 15:07)    Procalcitonin, Serum: 0.67 ng/mL (12-09-22 @ 06:35)    D-Dimer Assay, Quantitative: 522 ng/mL DDU (12-09-22 @ 06:35)  D-Dimer Assay, Quantitative: 461 ng/mL DDU (12-08-22 @ 17:29)  D-Dimer Assay, Quantitative: 2105 ng/mL DDU (12-04-22 @ 07:05)    Lactate, Blood: 1.7 mmol/L (12-05-22 @ 23:49)  Lactate, Blood: 2.3 mmol/L (12-05-22 @ 16:50)  Lactate, Blood: 2.3 mmol/L (12-05-22 @ 15:55)  Lactate, Blood: 2.1 mmol/L (12-05-22 @ 11:16)  Lactate, Blood: 3.6 mmol/L (12-05-22 @ 06:00)  Lactate, Blood: 2.8 mmol/L (12-04-22 @ 11:20)    Prothrombin Time, Plasma: 15.1 sec (12-03-22 @ 14:14)  Activated Partial Thromboplastin Time: 31.2 sec (12-03-22 @ 14:14)    MICROBIOLOGY: reviewed  Culture - Urine (collected 12-03-22 @ 14:26)  Source: Clean Catch Clean Catch (Midstream)  Final Report (12-04-22 @ 17:17):    <10,000 CFU/mL Normal Urogenital Shira    Culture - Blood (collected 12-03-22 @ 13:59)  Source: .Blood Blood-Peripheral  Final Report (12-08-22 @ 19:01):    No Growth Final    Culture - Blood (collected 12-03-22 @ 13:55)  Source: .Blood Blood-Peripheral  Final Report (12-08-22 @ 19:01):    No Growth Final    RADIOLOGY & ADDITIONAL STUDIES: reviewed  CT Angio Chest PE Protocol w/ IV Cont (12.08.22 @ 20:52) >IMPRESSION:  No pulmonary embolism. New small bilateral pleural effusions since the abdominal CT of December 3, 2022 with adjacent bilateral lower lobe dependent opacities with homogenous enhancement suggestive of atelectasis. Perihepatic partially imaged small ascites new from 12/3/2022.     Xray Chest 1 View- PORTABLE-Urgent (Xray Chest 1 View- PORTABLE-Urgent .) (12.08.22 @ 14:10) >IMPRESSION: Redemonstrated bilateral reticular opacities, lower lung predominant.

## 2022-12-09 NOTE — PROGRESS NOTE ADULT - NSPROGADDITIONALINFOA_GEN_ALL_CORE
overall prognosis is poor. Family contemplating PEG.  Per CM note, the daughter in law Sharon requesting palliative eval for GOC and PEG discussion.  d/w NP. Palliative consulted.  d/w the daughter in law Sharon: all questions answered.    Will try to wean down hi-flow. can't do NG tube feeding on hi-flow.   Speech re-eval. reasonable to restart pureed diet if she passes.   If nutrition status poor and remain poor oral intake, temporary NG tube feeding once she is off of hi-flow.  May need PEG depending on goals of care.     - Dr. MARISELA Benavidez ([employer])  - [phone]

## 2022-12-10 NOTE — PROGRESS NOTE ADULT - PROBLEM SELECTOR PLAN 8
TSH 0.15, T4 535, T3 67  - multinodular goiter found on last admission   - endo on last admission rec'd to avoid contrast studies, had CT angio this admission  - endo c/s sent to [email or web address]  - no clinical signs of hyperthyroidism, no need for beta-blockade or methimazole for now

## 2022-12-10 NOTE — SWALLOW BEDSIDE ASSESSMENT ADULT - SWALLOW EVAL: DIAGNOSIS
Patient offered puree (applesauce), moderately thick liquids (apple flavored), and thin liquids (water), however, patient adamantly refused by shaking head "no" and putting her hands over her face despite maximum encouragement and education on purpose of assessment from SLP and daughter. Of Note: Patient self utilized ice chips to lips/tongue tip to provide moisture to oral cavity when given encouragement from SLP/daughter. Oral/pharyngeal swallow could not be adequately assessed given refusal of PO trials from patient.

## 2022-12-10 NOTE — PROGRESS NOTE ADULT - NSPROGADDITIONALINFOA_GEN_ALL_CORE
overall prognosis is poor. Family contemplating PEG.  Per CM note, the daughter in law Sharon requesting palliative eval for GOC and PEG discussion.  d/w NP. Palliative consulted.  d/w the daughter in law Sharon: all questions answered.    Will try to wean down hi-flow. can't do NG tube feeding on hi-flow.   Speech re-eval. reasonable to restart pureed diet if she passes.   If nutrition status poor and remain poor oral intake, temporary NG tube feeding once she is off of hi-flow.  May need PEG depending on goals of care.     Refusing speech eval again.  Less than 2 weeks ago, she passed for pureed diet  I discussed risk of aspiration and pt agreed to resume pureed.  also advised to allow full speech/swallow re-eval.   pt seems to be receptive.    - Dr. MARISELA Benavidez ([facility])  - [phone]

## 2022-12-10 NOTE — PROGRESS NOTE ADULT - SUBJECTIVE AND OBJECTIVE BOX
SUBJECTIVE/ OVERNIGHT EVENTS:  refused speech eval again despite encouragement  less than 2 weeks ago, she passed for pureed diet  I discussed risk of aspiration and pt agreed to resume pureed.  also advised to allow full speech/swallow re-eval.   pt seems to be receptive.  remain on hi-flow  no cp, no sob, no n/v/d. no abdominal pain.  no headache, no dizziness.   back to baseline mental status     --------------------------------------------------------------------------------------------  LABS:                        12.4   7.12  )-----------( 107      ( 10 Dec 2022 06:44 )             38.8     12-10    145  |  107  |  35<H>  ----------------------------<  249<H>  3.3<L>   |  27  |  0.42<L>    Ca    7.3<L>      10 Dec 2022 06:44  Phos  1.8     12-10  Mg     1.70     12-10    TPro  4.8<L>  /  Alb  1.9<L>  /  TBili  0.6  /  DBili  0.2  /  AST  66<H>  /  ALT  50<H>  /  AlkPhos  140<H>  12-09      CAPILLARY BLOOD GLUCOSE      POCT Blood Glucose.: 236 mg/dL (10 Dec 2022 17:58)  POCT Blood Glucose.: 240 mg/dL (10 Dec 2022 11:59)  POCT Blood Glucose.: 212 mg/dL (10 Dec 2022 05:26)  POCT Blood Glucose.: 212 mg/dL (09 Dec 2022 23:07)            RADIOLOGY & ADDITIONAL TESTS:    Imaging Personally Reviewed:  [x] YES  [ ] NO    Consultant(s) Notes Reviewed:  [x] YES  [ ] NO    MEDICATIONS  (STANDING):  budesonide 160 MICROgram(s)/formoterol 4.5 MICROgram(s) Inhaler 2 Puff(s) Inhalation two times a day  dexAMETHasone  Injectable 6 milliGRAM(s) IV Push daily  dextrose 5% + lactated ringers. 1000 milliLiter(s) (65 mL/Hr) IV Continuous <Continuous>  enoxaparin Injectable 40 milliGRAM(s) SubCutaneous every 24 hours    MEDICATIONS  (PRN):  albuterol    90 MICROgram(s) HFA Inhaler 2 Puff(s) Inhalation every 6 hours PRN Shortness of Breath and/or Wheezing      Care Discussed with Consultants/Other Providers [x] YES  [ ] NO    Vital Signs Last 24 Hrs  T(C): 36.4 (10 Dec 2022 18:29), Max: 36.4 (10 Dec 2022 07:50)  T(F): 97.5 (10 Dec 2022 18:29), Max: 97.6 (10 Dec 2022 07:50)  HR: 76 (10 Dec 2022 18:29) (68 - 76)  BP: 162/82 (10 Dec 2022 18:29) (154/80 - 162/82)  BP(mean): --  RR: 18 (10 Dec 2022 18:29) (17 - 20)  SpO2: 97% (10 Dec 2022 18:29) (92% - 100%)    Parameters below as of 10 Dec 2022 18:29  Patient On (Oxygen Delivery Method): nasal cannula, high flow  O2 Flow (L/min): 40  O2 Concentration (%): 50  I&O's Summary    09 Dec 2022 07:01  -  10 Dec 2022 07:00  --------------------------------------------------------  IN: 325 mL / OUT: 700 mL / NET: -375 mL        PHYSICAL EXAM:  GENERAL: NAD, ill-appearing, thin, cachetic, on hi-flow O2  HEAD:  Atraumatic, normocephalic  EYES: EOMI, PERRLA, conjunctiva and sclera clear  NECK: Supple, no JVD, no carotid bruits  HEART: Regular rate and rhythm, no murmurs, rubs, or gallops  CHEST/LUNG: mild decrease breath sounds bilaterally; No wheeze   ABDOMEN: Soft, nondistended, epigastric tenderness, no hepatosplenomegaly  EXTREMITIES: 2+ peripheral pulses bilaterally. No clubbing, cyanosis, or edema  NERVOUS SYSTEM:  A&Ox1-2, no focal posturing

## 2022-12-10 NOTE — PROGRESS NOTE ADULT - SUBJECTIVE AND OBJECTIVE BOX
[facility], Division of Infectious Diseases  JYOTI Dawson Y. Patel, S. Shah, G. Kenny  [phone]  ([phone] - weekdays after 5pm and weekends)    Name: TIFFANY HU  Age/Gender: 82y Female  MRN: [id number]    Interval History:  Patient seen this morning.   Notes reviewed.   No concerning overnight events.  Afebrile.     Allergies: fish (Other)  penicillin (Hives)      Objective:  Vitals:   T(F): 97 (12-10-22 @ 04:55), Max: 97.6 (12-09-22 @ 08:10)  HR: 73 (12-10-22 @ 04:55) (64 - 73)  BP: 156/87 (12-10-22 @ 04:55) (147/83 - 156/87)  RR: 18 (12-10-22 @ 04:55) (18 - 20)  SpO2: 100% (12-10-22 @ 04:55) (91% - 100%)  Physical Examination:  General: ill appearing, HFNC  HEENT: anicteric  Cardio: S1, S2, normal rate  Resp: clear to auscultation anteriorly  Abd: soft, ND  Ext: no LE edema  Skin: warm, dry    Laboratory Studies:  CBC:                       12.4   7.12  )-----------( 107      ( 10 Dec 2022 06:44 )             38.8     WBC Trend:  7.12 12-10-22 @ 06:44  6.13 12-09-22 @ 06:35  5.39 12-08-22 @ 17:29  6.31 12-08-22 @ 06:40  9.03 12-07-22 @ 06:13  10.64 12-06-22 @ 06:10  11.53 12-05-22 @ 06:00  10.96 12-04-22 @ 07:05  8.00 12-03-22 @ 16:39  11.35 12-03-22 @ 15:07    CMP: 12-10    145  |  107  |  35<H>  ----------------------------<  249<H>  3.3<L>   |  27  |  0.42<L>    Ca    7.3<L>      10 Dec 2022 06:44  Phos  1.8     12-10  Mg     1.70     12-10    TPro  4.8<L>  /  Alb  1.9<L>  /  TBili  0.6  /  DBili  0.2  /  AST  66<H>  /  ALT  50<H>  /  AlkPhos  140<H>  12-09      LIVER FUNCTIONS - ( 09 Dec 2022 06:35 )  Alb: 1.9 g/dL / Pro: 4.8 g/dL / ALK PHOS: 140 U/L / ALT: 50 U/L / AST: 66 U/L / GGT: x               Microbiology: reviewed     Culture - Urine (collected 12-03-22 @ 14:26)  Source: Clean Catch Clean Catch (Midstream)  Final Report (12-04-22 @ 17:17):    <10,000 CFU/mL Normal Urogenital Shira    Culture - Blood (collected 12-03-22 @ 13:59)  Source: .Blood Blood-Peripheral  Final Report (12-08-22 @ 19:01):    No Growth Final    Culture - Blood (collected 12-03-22 @ 13:55)  Source: .Blood Blood-Peripheral  Final Report (12-08-22 @ 19:01):    No Growth Final        Radiology: reviewed     Medications:  albuterol    90 MICROgram(s) HFA Inhaler 2 Puff(s) Inhalation every 6 hours PRN  budesonide 160 MICROgram(s)/formoterol 4.5 MICROgram(s) Inhaler 2 Puff(s) Inhalation two times a day  dexAMETHasone  Injectable 6 milliGRAM(s) IV Push daily  dextrose 5% + lactated ringers. 1000 milliLiter(s) IV Continuous <Continuous>  enoxaparin Injectable 40 milliGRAM(s) SubCutaneous every 24 hours    Antimicrobials:

## 2022-12-10 NOTE — SWALLOW BEDSIDE ASSESSMENT ADULT - COMMENTS
Internal Medicine 10/9 "82 F with PMH HTN, HLD, hyperthyroidism (untreated), severe protein-calorie malnutrition, recent hospitalization 11/22-11/26 for dysphagia and weakness found to have multinodular goiter presents with generalized weakness likely 2/2 poor PO intake found to be septic and s/p short course of pressors for likely hypovolemic shock."    Of Note: patient is known to this service as patient was seen during a recent previous admission on 11/23 at which time puree with thin liquids was recommended. patient seen for an initial clinical swallow assessment during this admission on 12/7, however, patient refused PO trials and PO diet was deferred to MD with consideration of short-term non-oral means of nutrition/hydration. (please see notes).     Patient seen at bedside this afternoon for a reassessment of the swallow function, at which time patient was alert. Patient receiving supplemental O2 via High Flow Nasal cannula (FiO2 50%, 30L). Patient's daughter present at bedside and reporting patient will poor PO intake prior to admission (3 teaspoons of broth during the day). Daughter further adds patient has a "fear" of eating because she is scared she is "going to throw up". Patient is able to gesture via head nodding to express wants/needs.

## 2022-12-11 NOTE — SWALLOW BEDSIDE ASSESSMENT ADULT - ADDITIONAL RECOMMENDATIONS
Per discussion with MD and daughter, this service to reassess on 12/11 with son present. This service to follow up on 12/11 as schedule permits.
This department to follow up as schedule permits. Medical team further advised to reconsult this service as patient becomes medically optimized.

## 2022-12-11 NOTE — SWALLOW BEDSIDE ASSESSMENT ADULT - ASR SWALLOW REFERRAL
RD consult given patient is at increased nutritional risk and possible tube feed management/Registered Dietitian

## 2022-12-11 NOTE — PROGRESS NOTE ADULT - SUBJECTIVE AND OBJECTIVE BOX
Date of Service: 12-11-22 @ 09:40    Patient is a 82y old  Female who presents with a chief complaint of sepsis (11 Dec 2022 09:23)      Any change in ROS:     MEDICATIONS  (STANDING):  budesonide 160 MICROgram(s)/formoterol 4.5 MICROgram(s) Inhaler 2 Puff(s) Inhalation two times a day  dexAMETHasone  Injectable 6 milliGRAM(s) IV Push daily  dextrose 5% + lactated ringers. 1000 milliLiter(s) (65 mL/Hr) IV Continuous <Continuous>  enoxaparin Injectable 40 milliGRAM(s) SubCutaneous every 24 hours    MEDICATIONS  (PRN):  albuterol    90 MICROgram(s) HFA Inhaler 2 Puff(s) Inhalation every 6 hours PRN Shortness of Breath and/or Wheezing    Vital Signs Last 24 Hrs  T(C): 36.6 (11 Dec 2022 02:23), Max: 36.6 (11 Dec 2022 02:23)  T(F): 97.9 (11 Dec 2022 02:23), Max: 97.9 (11 Dec 2022 02:23)  HR: 87 (11 Dec 2022 03:40) (67 - 87)  BP: 155/79 (11 Dec 2022 02:23) (155/79 - 162/82)  BP(mean): --  RR: 18 (11 Dec 2022 03:40) (17 - 18)  SpO2: 100% (11 Dec 2022 03:40) (94% - 100%)    Parameters below as of 11 Dec 2022 03:40  Patient On (Oxygen Delivery Method): nasal cannula, high flow  O2 Flow (L/min): 40  O2 Concentration (%): 50    I&O's Summary    10 Dec 2022 07:01  -  11 Dec 2022 07:00  --------------------------------------------------------  IN: 780 mL / OUT: 500 mL / NET: 280 mL          Physical Exam:   GENERAL: NAD, well-groomed, well-developed  HEENT: SHARON/   Atraumatic, Normocephalic  ENMT: No tonsillar erythema, exudates, or enlargement; Moist mucous membranes, Good dentition, No lesions  NECK: Supple, No JVD, Normal thyroid  CHEST/LUNG: Clear to auscultaion  CVS: Regular rate and rhythm; No murmurs, rubs, or gallops  GI: : Soft, Nontender, Nondistended; Bowel sounds present  NERVOUS SYSTEM:  Alert & awake  : responsive to questions  EXTREMITIES: - edema  LYMPH: No lymphadenopathy noted  SKIN: No rashes or lesions  ENDOCRINOLOGY: No Thyromegaly  PSYCH:calm +     Labs:  29                            10.6   8.29  )-----------( 125      ( 11 Dec 2022 07:10 )             35.1                         12.4   7.12  )-----------( 107      ( 10 Dec 2022 06:44 )             38.8                         13.6   6.13  )-----------( 82       ( 09 Dec 2022 06:35 )             40.9                         14.8   5.39  )-----------( 84       ( 08 Dec 2022 17:29 )             45.8                         13.4   6.31  )-----------( 83       ( 08 Dec 2022 06:40 )             40.2     12-11    141  |  111<H>  |  33<H>  ----------------------------<  309<H>  5.0   |  16<L>  |  0.33<L>  12-10    145  |  107  |  35<H>  ----------------------------<  249<H>  3.3<L>   |  27  |  0.42<L>  12-09    141  |  102  |  25<H>  ----------------------------<  197<H>  3.0<L>   |  29  |  0.45<L>  12-08    141  |  101  |  20  ----------------------------<  209<H>  3.6   |  28  |  0.45<L>  12-08    142  |  104  |  23  ----------------------------<  216<H>  2.8<LL>   |  27  |  0.46<L>    Ca    7.2<L>      11 Dec 2022 07:10  Ca    7.3<L>      10 Dec 2022 06:44  Phos  4.0     12-11  Phos  1.8     12-10  Mg     2.10     12-11  Mg     1.70     12-10    TPro  4.8<L>  /  Alb  1.9<L>  /  TBili  0.6  /  DBili  0.2  /  AST  66<H>  /  ALT  50<H>  /  AlkPhos  140<H>  12-09  TPro  4.5<L>  /  Alb  1.7<L>  /  TBili  0.6  /  DBili  0.3  /  AST  65<H>  /  ALT  52<H>  /  AlkPhos  131<H>  12-08    CAPILLARY BLOOD GLUCOSE      POCT Blood Glucose.: 199 mg/dL (10 Dec 2022 22:23)  POCT Blood Glucose.: 236 mg/dL (10 Dec 2022 17:58)  POCT Blood Glucose.: 240 mg/dL (10 Dec 2022 11:59)            D-Dimer Assay, Quantitative: 522 ng/mL DDU (12-09 @ 06:35)  D-Dimer Assay, Quantitative: 461 ng/mL DDU (12-08 @ 17:29)  Procalcitonin, Serum: 0.67 ng/mL (12-09 @ 06:35)        RECENT CULTURES:        RESPIRATORY CULTURES:      rad< from: CT Angio Chest PE Protocol w/ IV Cont (12.08.22 @ 20:52) >  apical scarring. Emphysematous changes.    UPPER ABDOMEN: Perihepatic small partially imaged ascites new from   12/3/2022. Left renal calyceal calcifications.    BONES/SOFT TISSUES: Degenerative changes. Large thyroid gland, left   greater than right with rightward trachealdeviation.    IMPRESSION:  No pulmonary embolism.    New small bilateral pleural effusions since the abdominal CT of December   3, 2022 with adjacent bilateral lower lobe dependent opacities with   homogenous enhancement suggestive of atelectasis.    Perihepatic partially imaged small ascites new from 12/3/2022.    --- End of Report ---          SCHUYLER MAE DO; Resident Radiologist  This document has been electronically signed.    < end of copied text >      Studies  Chest X-RAY  CT SCAN Chest   Venous Dopplers: LE:   CT Abdomen  Others

## 2022-12-11 NOTE — SWALLOW BEDSIDE ASSESSMENT ADULT - SWALLOW EVAL: DIAGNOSIS
Severe oral stage for puree and moderately thick with teaspoon presentation marked by reduced acceptance with patient pushing back utensil with tongue and no attempt to strip/suck from utensil, only receiving trace amount despite max verbal encouragement with patient intermittently covering mouth with hand and refusing spoon presentation with tight labial seal. Patient self utilized ice chips to lips/tongue tip to provide moisture to oral cavity when given encouragement from SLP/daughter with delayed initiation of pharyngeal swallow and reduced hyolaryngeal excursion via palpation noted x1 given verbal to swallow. Severe oral stage for puree and moderately thick liquid with teaspoon presentation marked by reduced acceptance with patient pushing back utensil with tongue and no attempt to strip/suck from utensil, only receiving trace amount despite max verbal encouragement with patient intermittently covering mouth with hand and refusing spoon presentation with tight labial seal. Therefore unable to adequately assess pharyngeal stage to determine safe oral diet at this time. Patient self utilized ice chips to lips/tongue tip to provide moisture to oral cavity when given encouragement from SLP/daughter with delayed initiation of pharyngeal swallow and reduced hyolaryngeal excursion via palpation noted x1 given verbal cue to swallow.

## 2022-12-11 NOTE — PROGRESS NOTE ADULT - SUBJECTIVE AND OBJECTIVE BOX
SUBJECTIVE/ OVERNIGHT EVENTS:  refusing speech eval again  also saying no to feeding tube  awake  on hi-flow O2  nutrition consult for TPN      --------------------------------------------------------------------------------------------  LABS:                        10.6   8.29  )-----------( 125      ( 11 Dec 2022 07:10 )             35.1     12-11    x   |  x   |  30<H>  ----------------------------<  278<H>  x    |  26  |  0.40<L>    Ca    7.3<L>      11 Dec 2022 19:28  Phos  4.0     12-11  Mg     1.90     12-11        CAPILLARY BLOOD GLUCOSE      POCT Blood Glucose.: 273 mg/dL (11 Dec 2022 18:00)  POCT Blood Glucose.: 475 mg/dL (11 Dec 2022 17:59)  POCT Blood Glucose.: 258 mg/dL (11 Dec 2022 13:38)  POCT Blood Glucose.: 199 mg/dL (10 Dec 2022 22:23)            RADIOLOGY & ADDITIONAL TESTS:    Imaging Personally Reviewed:  [x] YES  [ ] NO    Consultant(s) Notes Reviewed:  [x] YES  [ ] NO    MEDICATIONS  (STANDING):  budesonide 160 MICROgram(s)/formoterol 4.5 MICROgram(s) Inhaler 2 Puff(s) Inhalation two times a day  dexAMETHasone  Injectable 6 milliGRAM(s) IV Push daily  enoxaparin Injectable 40 milliGRAM(s) SubCutaneous every 24 hours  sodium chloride 0.9%. 1000 milliLiter(s) (50 mL/Hr) IV Continuous <Continuous>    MEDICATIONS  (PRN):  albuterol    90 MICROgram(s) HFA Inhaler 2 Puff(s) Inhalation every 6 hours PRN Shortness of Breath and/or Wheezing      Care Discussed with Consultants/Other Providers [x] YES  [ ] NO    Vital Signs Last 24 Hrs  T(C): 36.4 (11 Dec 2022 08:00), Max: 36.6 (11 Dec 2022 02:23)  T(F): 97.6 (11 Dec 2022 08:00), Max: 97.9 (11 Dec 2022 02:23)  HR: 84 (11 Dec 2022 16:18) (75 - 87)  BP: 145/75 (11 Dec 2022 08:00) (145/75 - 155/79)  BP(mean): --  RR: 19 (11 Dec 2022 16:18) (18 - 19)  SpO2: 94% (11 Dec 2022 16:18) (94% - 100%)    Parameters below as of 11 Dec 2022 16:18  Patient On (Oxygen Delivery Method): nasal cannula, high flow  O2 Flow (L/min): 20  O2 Concentration (%): 35  I&O's Summary    10 Dec 2022 07:01  -  11 Dec 2022 07:00  --------------------------------------------------------  IN: 780 mL / OUT: 500 mL / NET: 280 mL          PHYSICAL EXAM:  GENERAL: NAD, ill-appearing, thin, cachetic, on hi-flow O2  HEAD:  Atraumatic, normocephalic  EYES: EOMI, PERRLA, conjunctiva and sclera clear  NECK: Supple, no JVD, no carotid bruits  HEART: Regular rate and rhythm, no murmurs, rubs, or gallops  CHEST/LUNG: mild decrease breath sounds bilaterally; No wheeze   ABDOMEN: Soft, nondistended, epigastric tenderness, no hepatosplenomegaly  EXTREMITIES: 2+ peripheral pulses bilaterally. No clubbing, cyanosis, or edema  NERVOUS SYSTEM:  A&Ox1-2, no focal posturing SUBJECTIVE/ OVERNIGHT EVENTS:  refusing speech eval again  also saying no to feeding tube  awake  on hi-flow O2  nutrition consult for TPN  sugars high: on decadron and on D5, change fluid to NS    --------------------------------------------------------------------------------------------  LABS:                        10.6   8.29  )-----------( 125      ( 11 Dec 2022 07:10 )             35.1     12-11    x   |  x   |  30<H>  ----------------------------<  278<H>  x    |  26  |  0.40<L>    Ca    7.3<L>      11 Dec 2022 19:28  Phos  4.0     12-11  Mg     1.90     12-11        CAPILLARY BLOOD GLUCOSE      POCT Blood Glucose.: 273 mg/dL (11 Dec 2022 18:00)  POCT Blood Glucose.: 475 mg/dL (11 Dec 2022 17:59)  POCT Blood Glucose.: 258 mg/dL (11 Dec 2022 13:38)  POCT Blood Glucose.: 199 mg/dL (10 Dec 2022 22:23)            RADIOLOGY & ADDITIONAL TESTS:    Imaging Personally Reviewed:  [x] YES  [ ] NO    Consultant(s) Notes Reviewed:  [x] YES  [ ] NO    MEDICATIONS  (STANDING):  budesonide 160 MICROgram(s)/formoterol 4.5 MICROgram(s) Inhaler 2 Puff(s) Inhalation two times a day  dexAMETHasone  Injectable 6 milliGRAM(s) IV Push daily  enoxaparin Injectable 40 milliGRAM(s) SubCutaneous every 24 hours  sodium chloride 0.9%. 1000 milliLiter(s) (50 mL/Hr) IV Continuous <Continuous>    MEDICATIONS  (PRN):  albuterol    90 MICROgram(s) HFA Inhaler 2 Puff(s) Inhalation every 6 hours PRN Shortness of Breath and/or Wheezing      Care Discussed with Consultants/Other Providers [x] YES  [ ] NO    Vital Signs Last 24 Hrs  T(C): 36.4 (11 Dec 2022 08:00), Max: 36.6 (11 Dec 2022 02:23)  T(F): 97.6 (11 Dec 2022 08:00), Max: 97.9 (11 Dec 2022 02:23)  HR: 84 (11 Dec 2022 16:18) (75 - 87)  BP: 145/75 (11 Dec 2022 08:00) (145/75 - 155/79)  BP(mean): --  RR: 19 (11 Dec 2022 16:18) (18 - 19)  SpO2: 94% (11 Dec 2022 16:18) (94% - 100%)    Parameters below as of 11 Dec 2022 16:18  Patient On (Oxygen Delivery Method): nasal cannula, high flow  O2 Flow (L/min): 20  O2 Concentration (%): 35  I&O's Summary    10 Dec 2022 07:01  -  11 Dec 2022 07:00  --------------------------------------------------------  IN: 780 mL / OUT: 500 mL / NET: 280 mL          PHYSICAL EXAM:  GENERAL: NAD, ill-appearing, thin, cachetic, on hi-flow O2  HEAD:  Atraumatic, normocephalic  EYES: EOMI, PERRLA, conjunctiva and sclera clear  NECK: Supple, no JVD, no carotid bruits  HEART: Regular rate and rhythm, no murmurs, rubs, or gallops  CHEST/LUNG: mild decrease breath sounds bilaterally; No wheeze   ABDOMEN: Soft, nondistended, epigastric tenderness, no hepatosplenomegaly  EXTREMITIES: 2+ peripheral pulses bilaterally. No clubbing, cyanosis, or edema  NERVOUS SYSTEM:  A&Ox1-2, no focal posturing

## 2022-12-11 NOTE — SWALLOW BEDSIDE ASSESSMENT ADULT - COMMENTS
Progress  Note- Internal Medicine 12/10: "82 F with PMH HTN, HLD, hyperthyroidism (untreated), severe protein-calorie malnutrition, recent hospitalization 11/22-11/26 for dysphagia and weakness found to have multinodular goiter presents with generalized weakness likely 2/2 poor PO intake found to be septic and s/p short course of pressors for likely hypovolemic shock."    CT Chest 12/8: "IMPRESSION: No pulmonary embolism. New small bilateral pleural effusions since the abdominal CT of December 3, 2022 with adjacent bilateral lower lobe dependent opacities with homogenous enhancement suggestive of atelectasis. Perihepatic partially imaged small ascites new from 12/3/2022."    Of Note: Patient is known to this service with recent reevaluation completed on 12/10/22 however patient refused PO trials and PO diet deferred to MD with consideration for short term nonoral means of nutrition (see full report for details).     Patient seen at bedside awake/alert during clinical swallow evaluation this AM with daughter and son present at bedside. Patient's son and daughter report poor PO intake from patient since Thanksgiving. Patient required max encouragement to participate in evaluation. Patient is noted with high flow nasal cannula. Patient is able to gesture via head nodding to express needs/wants. Patient noted with dry lingual surface and hardened mucosa on hard palate and back of tongue which SLP administered oral care to clear.

## 2022-12-11 NOTE — PROGRESS NOTE ADULT - SUBJECTIVE AND OBJECTIVE BOX
[facility], Division of Infectious Diseases  JYOTI Dawson Y. Patel, S. Shah, G. Kenny  [phone]  ([phone] - weekdays after 5pm and weekends)    Name: TIFFANY HU  Age/Gender: 82y Female  MRN: [id number]    Interval History:  Patient seen this morning.   Notes reviewed.   Afebrile.   pt refused to participate w/ speech/ swallow    Allergies: fish (Other)  penicillin (Hives)      Objective:  Vitals:   T(F): 97.9 (12-11-22 @ 02:23), Max: 97.9 (12-11-22 @ 02:23)  HR: 87 (12-11-22 @ 03:40) (67 - 87)  BP: 155/79 (12-11-22 @ 02:23) (155/79 - 162/82)  RR: 18 (12-11-22 @ 03:40) (17 - 18)  SpO2: 100% (12-11-22 @ 03:40) (94% - 100%)  Physical Examination:  General: chronically ill appearing, HFNC  HEENT: anicteric  Cardio: S1, S2, normal rate  Resp: decreased breath sounds  Abd: soft, ND  Ext: +1 LE edema b/l  Skin: warm, dry    Laboratory Studies:  CBC:                       10.6   8.29  )-----------( 125      ( 11 Dec 2022 07:10 )             35.1     WBC Trend:  8.29 12-11-22 @ 07:10  7.12 12-10-22 @ 06:44  6.13 12-09-22 @ 06:35  5.39 12-08-22 @ 17:29  6.31 12-08-22 @ 06:40  9.03 12-07-22 @ 06:13  10.64 12-06-22 @ 06:10  11.53 12-05-22 @ 06:00    CMP: 12-11    141  |  111<H>  |  33<H>  ----------------------------<  309<H>  5.0   |  16<L>  |  0.33<L>    Ca    7.2<L>      11 Dec 2022 07:10  Phos  4.0     12-11  Mg     2.10     12-11              Microbiology: reviewed     Culture - Urine (collected 12-03-22 @ 14:26)  Source: Clean Catch Clean Catch (Midstream)  Final Report (12-04-22 @ 17:17):    <10,000 CFU/mL Normal Urogenital Shira    Culture - Blood (collected 12-03-22 @ 13:59)  Source: .Blood Blood-Peripheral  Final Report (12-08-22 @ 19:01):    No Growth Final    Culture - Blood (collected 12-03-22 @ 13:55)  Source: .Blood Blood-Peripheral  Final Report (12-08-22 @ 19:01):    No Growth Final        Radiology: reviewed     Medications:  albuterol    90 MICROgram(s) HFA Inhaler 2 Puff(s) Inhalation every 6 hours PRN  budesonide 160 MICROgram(s)/formoterol 4.5 MICROgram(s) Inhaler 2 Puff(s) Inhalation two times a day  dexAMETHasone  Injectable 6 milliGRAM(s) IV Push daily  dextrose 5% + lactated ringers. 1000 milliLiter(s) IV Continuous <Continuous>  enoxaparin Injectable 40 milliGRAM(s) SubCutaneous every 24 hours    Antimicrobials:

## 2022-12-11 NOTE — PROGRESS NOTE ADULT - PROBLEM SELECTOR PLAN 8
TSH 0.15, T4 535, T3 67  - multinodular goiter found on last admission   - endo on last admission rec'd to avoid contrast studies, had CT angio this admission  - endo c/s sent to [email or web address]  - no clinical signs of hyperthyroidism, no need for beta-blockade or methimazole for now

## 2022-12-11 NOTE — SWALLOW BEDSIDE ASSESSMENT ADULT - SWALLOW EVAL: RECOMMENDED DIET
Consider NPO with consideration for short term nonoral means of nutrition.
Defer to MD’s discretion for oral diet given patient refusal of PO trials.
1) Defer PO diet to MD given patient refusal 2) Consider short-term non-oral means of nutrition/hydration/medication. 3) RD consult given patient is at an increased nutritional risk.
2.09

## 2022-12-11 NOTE — PROGRESS NOTE ADULT - PROBLEM SELECTOR PLAN 10
DVT ppx: heparin subq --> dc given dropping plts. HIT ab sent: negative. (CT angio neg for PE)  restart DVT ppx with LOVENOX 40 mg qdaily  Diet: NPO pending bedside dysphagia screen (refusing)     DNR/DNI

## 2022-12-11 NOTE — PROGRESS NOTE ADULT - NSPROGADDITIONALINFOA_GEN_ALL_CORE
overall prognosis is poor. Family contemplating PEG.  Per CM note, the daughter in law Sharon requesting palliative eval for GOC and PEG discussion.  d/w NP. Palliative consulted.  d/w the daughter in law Sharon: all questions answered.    Will try to wean down hi-flow. can't do NG tube feeding on hi-flow.   Speech re-eval. reasonable to restart pureed diet if she passes.   If nutrition status poor and remain poor oral intake, temporary NG tube feeding once she is off of hi-flow.  May need PEG depending on goals of care.     Refusing speech eval again.  Less than 2 weeks ago, she passed for pureed diet  I discussed risk of aspiration and pt agreed to resume pureed.  also advised to allow full speech/swallow re-eval.   pt seems to be receptive.    Refusing another trial of speech eval.  nutrition consult for TPN    - Dr. MARISELA Benavidez ([facility])  - [phone] overall prognosis is poor. Family contemplating PEG.  Per CM note, the daughter in law Sharon requesting palliative eval for GOC and PEG discussion.  d/w NP. Palliative consulted.  d/w the daughter in law Sharon: all questions answered.    Will try to wean down hi-flow. can't do NG tube feeding on hi-flow.   Speech re-eval. reasonable to restart pureed diet if she passes.   If nutrition status poor and remain poor oral intake, temporary NG tube feeding once she is off of hi-flow.  May need PEG depending on goals of care.     Refusing speech eval again.  Less than 2 weeks ago, she passed for pureed diet  I discussed risk of aspiration and pt agreed to resume pureed.  also advised to allow full speech/swallow re-eval.   pt seems to be receptive.    Refusing another trial of speech eval.  nutrition consult for TPN  sugars high: on decadron and on D5, change fluid to NS    - Dr. MARISELA Eliaset ([facility])  - [phone]

## 2022-12-11 NOTE — SWALLOW BEDSIDE ASSESSMENT ADULT - ASR SWALLOW RECOMMEND DIAG
Objective testing NOT warranted given clinical presentation/patient behavior during clinical swallow assessment.
Objective testing NOT warranted given clinical presentation/patient behavior during clinical swallow assessment.

## 2022-12-11 NOTE — SWALLOW BEDSIDE ASSESSMENT ADULT - ORAL PREPARATORY PHASE
Reduced acceptance with no attempted to strip/suck from utensil Reduced acceptance with no attempt to strip/suck from utensil

## 2022-12-12 NOTE — CHART NOTE - NSCHARTNOTEFT_GEN_A_CORE
Vital Signs Last 24 Hrs  T(C): 36.5 (12 Dec 2022 02:30), Max: 36.6 (11 Dec 2022 22:49)  T(F): 97.7 (12 Dec 2022 02:30), Max: 97.8 (11 Dec 2022 22:49)  HR: 84 (12 Dec 2022 02:30) (76 - 84)  BP: 162/91 (12 Dec 2022 02:30) (157/75 - 162/91)  BP(mean): --  RR: 19 (12 Dec 2022 08:03) (18 - 19)  SpO2: 97% (12 Dec 2022 08:03) (94% - 98%)    Parameters below as of 12 Dec 2022 08:03  Patient On (Oxygen Delivery Method): nasal cannula, high flow  O2 Flow (L/min): 30  O2 Concentration (%): 50                        9.6    7.54  )-----------( 145      ( 12 Dec 2022 07:53 )             30.0   12-12    149<H>  |  113<H>  |  30<H>  ----------------------------<  186<H>  3.7   |  23  |  0.42<L>    Ca    7.7<L>      12 Dec 2022 06:55  Phos  2.2     12-12  Mg     1.90     12-12    TPro  4.5<L>  /  Alb  2.2<L>  /  TBili  0.5  /  DBili  <0.2  /  AST  26  /  ALT  36<H>  /  AlkPhos  130<H>  12-12      Psych consult called Vital Signs Last 24 Hrs  T(C): 36.5 (12 Dec 2022 02:30), Max: 36.6 (11 Dec 2022 22:49)  T(F): 97.7 (12 Dec 2022 02:30), Max: 97.8 (11 Dec 2022 22:49)  HR: 84 (12 Dec 2022 02:30) (76 - 84)  BP: 162/91 (12 Dec 2022 02:30) (157/75 - 162/91)  BP(mean): --  RR: 19 (12 Dec 2022 08:03) (18 - 19)  SpO2: 97% (12 Dec 2022 08:03) (94% - 98%)    Parameters below as of 12 Dec 2022 08:03  Patient On (Oxygen Delivery Method): nasal cannula, high flow  O2 Flow (L/min): 30  O2 Concentration (%): 50                        9.6    7.54  )-----------( 145      ( 12 Dec 2022 07:53 )             30.0   12-12    149<H>  |  113<H>  |  30<H>  ----------------------------<  186<H>  3.7   |  23  |  0.42<L>    Ca    7.7<L>      12 Dec 2022 06:55  Phos  2.2     12-12  Mg     1.90     12-12    TPro  4.5<L>  /  Alb  2.2<L>  /  TBili  0.5  /  DBili  <0.2  /  AST  26  /  ALT  36<H>  /  AlkPhos  130<H>  12-12      Psych consult called for underlying depression (as per patient's family), discussed case with Dr. Buck.   Patient remains on HFNC, desated overnight, d-dimer downtrending, repeat CXR ordered as discussed with Pulm Dr. White.   Above results and case discussed with Dr. Benavidez> continue current IVF for now, monitor FS q6h/ISS while on steroids, will repeat BMP q12h, Nephro consult called by attending. Writer spoke with Pulbina White, check repeat repeat CXR, continue HFNC and wean down as tolerated, d-dimer downtrending.   Palliative plan for meeting today with family, will closely monitor, discussed plan of care with RN and attending

## 2022-12-12 NOTE — BH CONSULTATION LIAISON ASSESSMENT NOTE - CURRENT MEDICATION
MEDICATIONS  (STANDING):  budesonide 160 MICROgram(s)/formoterol 4.5 MICROgram(s) Inhaler 2 Puff(s) Inhalation two times a day  chlorhexidine 2% Cloths 1 Application(s) Topical daily  dexAMETHasone  Injectable 6 milliGRAM(s) IV Push daily  dextrose 5% + sodium chloride 0.45%. 1000 milliLiter(s) (50 mL/Hr) IV Continuous <Continuous>  dextrose 5%. 1000 milliLiter(s) (50 mL/Hr) IV Continuous <Continuous>  dextrose 5%. 1000 milliLiter(s) (100 mL/Hr) IV Continuous <Continuous>  dextrose 50% Injectable 25 Gram(s) IV Push once  dextrose 50% Injectable 25 Gram(s) IV Push once  dextrose 50% Injectable 12.5 Gram(s) IV Push once  enoxaparin Injectable 40 milliGRAM(s) SubCutaneous every 24 hours  furosemide   Injectable 20 milliGRAM(s) IV Push once  glucagon  Injectable 1 milliGRAM(s) IntraMuscular once  insulin lispro (ADMELOG) corrective regimen sliding scale   SubCutaneous every 6 hours  mupirocin 2% Ointment 1 Application(s) Topical two times a day  pantoprazole  Injectable 40 milliGRAM(s) IV Push daily    MEDICATIONS  (PRN):  albuterol    90 MICROgram(s) HFA Inhaler 2 Puff(s) Inhalation every 6 hours PRN Shortness of Breath and/or Wheezing  dextrose Oral Gel 15 Gram(s) Oral once PRN Blood Glucose LESS THAN 70 milliGRAM(s)/deciliter

## 2022-12-12 NOTE — CONSULT NOTE ADULT - SUBJECTIVE AND OBJECTIVE BOX
[facility] [place] [facility]   MD DISHA GERMAIN MD KRISTINE SOLTANPOUR, DO ANGELA WONG, PA        TEL:  OFFICE: [phone]  From 5pm-7am answering service [phone]    --- INITIAL RENAL CONSULT NOTE ---date of service 12-12-22 @ 13:32    HPI: history from chart  82F with PMH HTN, HLD, hyperthyroidism (untreated), recent hospitalization 11/22-11/26 for dysphagia and weakness found to have multinodular goiter presents with generalized weakness and poor PO intake. Family reported after patient left the hospital she was eating and drinking very little. Over the last week pt has become more lethargic and walking around less. Prior to previous hospitalization pt was independent and working. Family brought pt to hospital because they were worried about mental status and poor PO intake. No recent changes in medications. Had 1x episode of diarrhea prior to hospitalization. Pt endorsed SOB. Denied chest pain, n/v, fever, chills, cough.    nephrology consulted for hypernatremia        Allergies:  fish (Other)  penicillin (Hives)      PAST MEDICAL & SURGICAL HISTORY:  HTN (hypertension)      HLD (hyperlipidemia)      No significant past surgical history          Home Medications Reviewed    Hospital Medications:   MEDICATIONS  (STANDING):  budesonide 160 MICROgram(s)/formoterol 4.5 MICROgram(s) Inhaler 2 Puff(s) Inhalation two times a day  chlorhexidine 2% Cloths 1 Application(s) Topical daily  dexAMETHasone  Injectable 6 milliGRAM(s) IV Push daily  dextrose 5% + sodium chloride 0.45%. 1000 milliLiter(s) (50 mL/Hr) IV Continuous <Continuous>  dextrose 5%. 1000 milliLiter(s) (100 mL/Hr) IV Continuous <Continuous>  dextrose 5%. 1000 milliLiter(s) (50 mL/Hr) IV Continuous <Continuous>  dextrose 50% Injectable 25 Gram(s) IV Push once  dextrose 50% Injectable 12.5 Gram(s) IV Push once  dextrose 50% Injectable 25 Gram(s) IV Push once  enoxaparin Injectable 40 milliGRAM(s) SubCutaneous every 24 hours  glucagon  Injectable 1 milliGRAM(s) IntraMuscular once  insulin lispro (ADMELOG) corrective regimen sliding scale   SubCutaneous every 6 hours  mupirocin 2% Ointment 1 Application(s) Topical two times a day      SOCIAL HISTORY:  unable to obtain    FAMILY HISTORY:  No pertinent family history in first degree relatives        REVIEW OF SYSTEMS:  unable to obtain    VITALS:  T(F): 97.4 (12-12-22 @ 12:34), Max: 97.8 (12-11-22 @ 22:49)  HR: 80 (12-12-22 @ 12:34)  BP: 149/82 (12-12-22 @ 12:34)  RR: 19 (12-12-22 @ 12:34)  SpO2: 98% (12-12-22 @ 12:34)  Wt(kg): --    12-11 @ 07:01  -  12-12 @ 07:00  --------------------------------------------------------  IN: 600 mL / OUT: 500 mL / NET: 100 mL          PHYSICAL EXAM:  General: lethargic  HEENT: anicteric sclera, oropharynx clear, MMM  Neck: No JVD  Respiratory: CTAB, no wheezes, rales or rhonchi  Cardiovascular: S1, S2, RRR  Gastrointestinal: BS+, soft, NT/ND  Extremities: No cyanosis or clubbing. No peripheral edema  Neurological: A/O x0  Psychiatric: Normal mood, normal affect  : No CVA tenderness. No chester.   Skin: No rashes      LABS:  12-12    149<H>  |  113<H>  |  30<H>  ----------------------------<  186<H>  3.7   |  23  |  0.42<L>    Ca    7.7<L>      12 Dec 2022 06:55  Phos  2.2     12-12  Mg     1.90     12-12    TPro  4.5<L>  /  Alb  2.2<L>  /  TBili  0.5  /  DBili  <0.2  /  AST  26  /  ALT  36<H>  /  AlkPhos  130<H>  12-12    Creatinine Trend: 0.42 <--, 0.40 <--, 0.33 <--, 0.42 <--, 0.45 <--, 0.45 <--, 0.46 <--, 0.54 <--, 0.60 <--, 0.66 <--, 0.70 <--, 0.73 <--                        9.6    7.54  )-----------( 145      ( 12 Dec 2022 07:53 )             30.0     Urine Studies:        RADIOLOGY & ADDITIONAL STUDIES:                 [facility] [place] [facility]   MD DISHA GERMAIN MD KRISTINE SOLTANPOUR, DO ANGELA WONG, PA        TEL:  OFFICE: [phone]  From 5pm-7am answering service [phone]    --- INITIAL RENAL CONSULT NOTE ---date of service 12-12-22 @ 13:32    HPI: history from chart  82F with PMH HTN, HLD, hyperthyroidism (untreated), recent hospitalization 11/22-11/26 for dysphagia and weakness found to have multinodular goiter presents with generalized weakness and poor PO intake. Family reported after patient left the hospital she was eating and drinking very little. Over the last week pt has become more lethargic and walking around less. Prior to previous hospitalization pt was independent and working. Family brought pt to hospital because they were worried about mental status and poor PO intake. No recent changes in medications. Had 1x episode of diarrhea prior to hospitalization. Pt endorsed SOB. Denied chest pain, n/v, fever, chills, cough.    Nephrology consulted for hypernatremia        Allergies:  fish (Other)  penicillin (Hives)      PAST MEDICAL & SURGICAL HISTORY:  HTN (hypertension)      HLD (hyperlipidemia)      No significant past surgical history          Home Medications Reviewed    Hospital Medications:   MEDICATIONS  (STANDING):  budesonide 160 MICROgram(s)/formoterol 4.5 MICROgram(s) Inhaler 2 Puff(s) Inhalation two times a day  chlorhexidine 2% Cloths 1 Application(s) Topical daily  dexAMETHasone  Injectable 6 milliGRAM(s) IV Push daily  dextrose 5% + sodium chloride 0.45%. 1000 milliLiter(s) (50 mL/Hr) IV Continuous <Continuous>  dextrose 5%. 1000 milliLiter(s) (100 mL/Hr) IV Continuous <Continuous>  dextrose 5%. 1000 milliLiter(s) (50 mL/Hr) IV Continuous <Continuous>  dextrose 50% Injectable 25 Gram(s) IV Push once  dextrose 50% Injectable 12.5 Gram(s) IV Push once  dextrose 50% Injectable 25 Gram(s) IV Push once  enoxaparin Injectable 40 milliGRAM(s) SubCutaneous every 24 hours  glucagon  Injectable 1 milliGRAM(s) IntraMuscular once  insulin lispro (ADMELOG) corrective regimen sliding scale   SubCutaneous every 6 hours  mupirocin 2% Ointment 1 Application(s) Topical two times a day      SOCIAL HISTORY:  unable to obtain    FAMILY HISTORY:  No pertinent family history in first degree relatives        REVIEW OF SYSTEMS:  unable to obtain    VITALS:  T(F): 97.4 (12-12-22 @ 12:34), Max: 97.8 (12-11-22 @ 22:49)  HR: 80 (12-12-22 @ 12:34)  BP: 149/82 (12-12-22 @ 12:34)  RR: 19 (12-12-22 @ 12:34)  SpO2: 98% (12-12-22 @ 12:34)  Wt(kg): --    12-11 @ 07:01  -  12-12 @ 07:00  --------------------------------------------------------  IN: 600 mL / OUT: 500 mL / NET: 100 mL          PHYSICAL EXAM:  General: lethargic  HEENT: anicteric sclera, oropharynx clear, MMM  Neck: No JVD  Respiratory: CTAB, no wheezes, rales or rhonchi  Cardiovascular: S1, S2, RRR  Gastrointestinal: BS+, soft, NT/ND  Extremities: No cyanosis or clubbing. No peripheral edema  Neurological: A/O x0  Psychiatric: Normal mood, normal affect  : No CVA tenderness. No chester.   Skin: No rashes      LABS:  12-12    149<H>  |  113<H>  |  30<H>  ----------------------------<  186<H>  3.7   |  23  |  0.42<L>    Ca    7.7<L>      12 Dec 2022 06:55  Phos  2.2     12-12  Mg     1.90     12-12    TPro  4.5<L>  /  Alb  2.2<L>  /  TBili  0.5  /  DBili  <0.2  /  AST  26  /  ALT  36<H>  /  AlkPhos  130<H>  12-12    Creatinine Trend: 0.42 <--, 0.40 <--, 0.33 <--, 0.42 <--, 0.45 <--, 0.45 <--, 0.46 <--, 0.54 <--, 0.60 <--, 0.66 <--, 0.70 <--, 0.73 <--                        9.6    7.54  )-----------( 145      ( 12 Dec 2022 07:53 )             30.0     Urine Studies:        RADIOLOGY & ADDITIONAL STUDIES:

## 2022-12-12 NOTE — BH CONSULTATION LIAISON ASSESSMENT NOTE - HPI (INCLUDE ILLNESS QUALITY, SEVERITY, DURATION, TIMING, CONTEXT, MODIFYING FACTORS, ASSOCIATED SIGNS AND SYMPTOMS)
82 F with PMH HTN, HLD, hyperthyroidism (untreated), severe protein-calorie malnutrition, recent hospitalization 11/22-11/26 for dysphagia and weakness found to have multinodular goiter presents with generalized weakness likely 2/2 poor PO intake found to be septic and s/p short course of pressors for likely hypovolemic shock, psych c/s for anxiety/fear surrounding oral exam, PO intake etc.    Patient AOx2, knows location, year but not date/month. Exam limited by low participation. Verbalizes briefly but answers most questions via nodding/shaking head. Denies feels depressed overall, admits to feeling anxious to open her mouth, feels worried that doctors/nurses will hurt her by doing so, expresses some possible paranoid toward staff. Denies suicidality/intent or plan and denies homicidality/intent or plan. Unable to reconcile desire to live and lack of PO intake.     No signs of overt catatonia other than mutism. No catalepsy, waxiness, rigidity, automatic obedience, negativism etc    82 F with PMH HTN, HLD, hyperthyroidism (untreated), severe protein-calorie malnutrition, recent hospitalization 11/22-11/26 for dysphagia and weakness found to have multinodular goiter presents with generalized weakness likely 2/2 poor PO intake found to be septic and s/p short course of pressors for likely hypovolemic shock, psych c/s for anxiety/fear surrounding oral exam, PO intake etc.    Patient AOx2, knows location, year but not date/month. Exam limited by low participation. Verbalizes briefly but answers most questions via nodding/shaking head. Denies feels depressed overall, admits to feeling anxious to open her mouth, feels worried that doctors/nurses will hurt her by doing so, expresses some possible paranoid toward staff. Denies suicidality/intent or plan and denies homicidality/intent or plan. Unable to reconcile desire to live and lack of PO intake.     No signs of overt catatonia other than mutism. No catalepsy, waxiness, rigidity, automatic obedience, negativism etc     Discussed with family /daughter - patient with long history of phobias, agoraphobia, no prior SI/HI, suspect current situation is phobia related as well. Has mentioned fear of nausea/vomiting if opens mouth or if mouth is examined.

## 2022-12-12 NOTE — BH CONSULTATION LIAISON ASSESSMENT NOTE - SUMMARY
82 F with PMH HTN, HLD, hyperthyroidism (untreated), severe protein-calorie malnutrition, recent hospitalization 11/22-11/26 for dysphagia and weakness found to have multinodular goiter presents with generalized weakness likely 2/2 poor PO intake found to be septic and s/p short course of pressors for likely hypovolemic shock, psych c/s for anxiety/fear surrounding oral exam, PO intake etc.    Patient exam very limited - suspect possible paranoia surrounding opening mouth, would begin meds as below. Olanzapine may help increase appetite, help off label with anxiety and with paranoia.    82 F with PMH HTN, HLD, hyperthyroidism (untreated), severe protein-calorie malnutrition, recent hospitalization 11/22-11/26 for dysphagia and weakness found to have multinodular goiter presents with generalized weakness likely 2/2 poor PO intake found to be septic and s/p short course of pressors for likely hypovolemic shock, psych c/s for anxiety/fear surrounding oral exam, PO intake etc.    Patient exam very limited - suspect possible paranoia surrounding opening mouth, would begin meds as below. Olanzapine may help increase appetite, help off label with anxiety and with paranoia. As patient has fear of nausea/vomiting, olanzapine may ppx nausea as well. No suspicion for SI/HI. Patient may need IM/IV meds, no IV form of SSRI's available would use antipsychotics instead as these have IM/IV form.     Recs:  -    82 F with PMH HTN, HLD, hyperthyroidism (untreated), severe protein-calorie malnutrition, recent hospitalization 11/22-11/26 for dysphagia and weakness found to have multinodular goiter presents with generalized weakness likely 2/2 poor PO intake found to be septic and s/p short course of pressors for likely hypovolemic shock, psych c/s for anxiety/fear surrounding oral exam, PO intake etc.    Patient exam very limited - suspect possible paranoia surrounding opening mouth, would begin meds as below. Olanzapine may help increase appetite, help off label with anxiety and with paranoia. As patient has fear of nausea/vomiting, olanzapine may ppx nausea as well. No suspicion for SI/HI. Patient may need IM/IV meds, no IV form of SSRI's available would use antipsychotics instead as these have IM/IV form.     Recs:  - hold off on starting olanzapine 2.5mg qHS until daughter discusses with rest of family  - palliative care f/u appreciated  - psych will follow

## 2022-12-12 NOTE — PROGRESS NOTE ADULT - SUBJECTIVE AND OBJECTIVE BOX
Date of Service: 12-12-22 @ 11:33    Patient is a 82y old  Female who presents with a chief complaint of sepsis (11 Dec 2022 16:39)      Any change in ROS: seems OK:  but desaturated overnight and her oxygenation had to increased:     MEDICATIONS  (STANDING):  budesonide 160 MICROgram(s)/formoterol 4.5 MICROgram(s) Inhaler 2 Puff(s) Inhalation two times a day  chlorhexidine 2% Cloths 1 Application(s) Topical daily  dexAMETHasone  Injectable 6 milliGRAM(s) IV Push daily  dextrose 5% + sodium chloride 0.45%. 1000 milliLiter(s) (50 mL/Hr) IV Continuous <Continuous>  dextrose 5%. 1000 milliLiter(s) (50 mL/Hr) IV Continuous <Continuous>  dextrose 5%. 1000 milliLiter(s) (100 mL/Hr) IV Continuous <Continuous>  dextrose 50% Injectable 25 Gram(s) IV Push once  dextrose 50% Injectable 12.5 Gram(s) IV Push once  dextrose 50% Injectable 25 Gram(s) IV Push once  enoxaparin Injectable 40 milliGRAM(s) SubCutaneous every 24 hours  glucagon  Injectable 1 milliGRAM(s) IntraMuscular once  insulin lispro (ADMELOG) corrective regimen sliding scale   SubCutaneous every 6 hours  mupirocin 2% Ointment 1 Application(s) Topical two times a day    MEDICATIONS  (PRN):  albuterol    90 MICROgram(s) HFA Inhaler 2 Puff(s) Inhalation every 6 hours PRN Shortness of Breath and/or Wheezing  dextrose Oral Gel 15 Gram(s) Oral once PRN Blood Glucose LESS THAN 70 milliGRAM(s)/deciliter    Vital Signs Last 24 Hrs  T(C): 36.5 (12 Dec 2022 02:30), Max: 36.6 (11 Dec 2022 22:49)  T(F): 97.7 (12 Dec 2022 02:30), Max: 97.8 (11 Dec 2022 22:49)  HR: 84 (12 Dec 2022 02:30) (76 - 84)  BP: 162/91 (12 Dec 2022 02:30) (157/75 - 162/91)  BP(mean): --  RR: 19 (12 Dec 2022 08:03) (18 - 19)  SpO2: 97% (12 Dec 2022 08:03) (94% - 97%)    Parameters below as of 12 Dec 2022 08:03  Patient On (Oxygen Delivery Method): nasal cannula, high flow  O2 Flow (L/min): 30  O2 Concentration (%): 50    I&O's Summary    11 Dec 2022 07:01  -  12 Dec 2022 07:00  --------------------------------------------------------  IN: 600 mL / OUT: 500 mL / NET: 100 mL          Physical Exam:   GENERAL: NAD, well-groomed, well-developed  HEENT: SHARON/   Atraumatic, Normocephalic  ENMT: No tonsillar erythema, exudates, or enlargement; Moist mucous membranes, Good dentition, No lesions  NECK: Supple, No JVD, Normal thyroid  CHEST/LUNG: Clear to auscultaion- no wheezing:   CVS: Regular rate and rhythm; No murmurs, rubs, or gallops  GI: : Soft, Nontender, Nondistended; Bowel sounds present  NERVOUS SYSTEM:  Alert & Oriented X3  EXTREMITIES: -edema  LYMPH: No lymphadenopathy noted  SKIN: No rashes or lesions  ENDOCRINOLOGY: No Thyromegaly  PSYCH: calm +    Labs:                              9.6    7.54  )-----------( 145      ( 12 Dec 2022 07:53 )             30.0                         10.6   8.29  )-----------( 125      ( 11 Dec 2022 07:10 )             35.1                         12.4   7.12  )-----------( 107      ( 10 Dec 2022 06:44 )             38.8                         13.6   6.13  )-----------( 82       ( 09 Dec 2022 06:35 )             40.9                         14.8   5.39  )-----------( 84       ( 08 Dec 2022 17:29 )             45.8     12-12    149<H>  |  113<H>  |  30<H>  ----------------------------<  186<H>  3.7   |  23  |  0.42<L>  12-11    150<H>  |  114<H>  |  30<H>  ----------------------------<  278<H>  4.0   |  26  |  0.40<L>  12-11    141  |  111<H>  |  33<H>  ----------------------------<  309<H>  5.0   |  16<L>  |  0.33<L>  12-10    145  |  107  |  35<H>  ----------------------------<  249<H>  3.3<L>   |  27  |  0.42<L>  12-09    141  |  102  |  25<H>  ----------------------------<  197<H>  3.0<L>   |  29  |  0.45<L>  12-08    141  |  101  |  20  ----------------------------<  209<H>  3.6   |  28  |  0.45<L>    Ca    7.7<L>      12 Dec 2022 06:55  Ca    7.3<L>      11 Dec 2022 19:28  Ca    7.2<L>      11 Dec 2022 07:10  Phos  2.2     12-12  Phos  2.3     12-11  Phos  4.0     12-11  Mg     1.90     12-12  Mg     1.90     12-11  Mg     2.10     12-11    TPro  4.5<L>  /  Alb  2.2<L>  /  TBili  0.5  /  DBili  <0.2  /  AST  26  /  ALT  36<H>  /  AlkPhos  130<H>  12-12  TPro  4.8<L>  /  Alb  1.9<L>  /  TBili  0.6  /  DBili  0.2  /  AST  66<H>  /  ALT  50<H>  /  AlkPhos  140<H>  12-09    CAPILLARY BLOOD GLUCOSE      POCT Blood Glucose.: 198 mg/dL (12 Dec 2022 06:10)  POCT Blood Glucose.: 235 mg/dL (12 Dec 2022 02:26)  POCT Blood Glucose.: 241 mg/dL (12 Dec 2022 01:16)  POCT Blood Glucose.: 260 mg/dL (11 Dec 2022 21:49)  POCT Blood Glucose.: 273 mg/dL (11 Dec 2022 18:00)  POCT Blood Glucose.: 475 mg/dL (11 Dec 2022 17:59)  POCT Blood Glucose.: 258 mg/dL (11 Dec 2022 13:38)      LIVER FUNCTIONS - ( 12 Dec 2022 06:55 )  Alb: 2.2 g/dL / Pro: 4.5 g/dL / ALK PHOS: 130 U/L / ALT: 36 U/L / AST: 26 U/L / GGT: x               D-Dimer Assay, Quantitative: 173 ng/mL DDU (12-12 @ 07:53)  D-Dimer Assay, Quantitative: 522 ng/mL DDU (12-09 @ 06:35)  D-Dimer Assay, Quantitative: 461 ng/mL DDU (12-08 @ 17:29)  Procalcitonin, Serum: 0.29 ng/mL (12-12 @ 06:55)  Procalcitonin, Serum: 0.67 ng/mL (12-09 @ 06:35)        RECENT CULTURES:      rad< from: CT Angio Chest PE Protocol w/ IV Cont (12.08.22 @ 20:52) >  PROCEDURE:  CT Angiogram of the chest was obtained with intravenous contrast. Three   dimensional maximum intensity projection (MIP) images were generated.    FINDINGS:    PULMONARY ANGIOGRAM: Contrast bolus is satisfactory. No main, right main,   left main, lobar or segmental pulmonary embolism. Slightly limited   evaluation of the subsegmental pulmonary arterial branches due to motion   artifact.    LYMPH NODES: Previously noted left upper paratracheal node unchanged.    HEART/VASCULATURE: The heart is normal in size. No pericardial effusion.   There are aortic and coronary artery calcifications.    AIRWAYS/LUNGS/PLEURA: New small bilateral pleural effusions with adjacent   opacities with homogenous enhancement suggestive of atelectasis in   bilateral lower lobes. Mild atelectasis in the lingula. Symmetric right   apical scarring. Emphysematous changes.    UPPER ABDOMEN: Perihepatic small partially imaged ascites new from   12/3/2022. Left renal calyceal calcifications.    BONES/SOFT TISSUES: Degenerative changes. Large thyroid gland, left   greater than right with rightward trachealdeviation.    IMPRESSION:  No pulmonary embolism.    New small bilateral pleural effusions since the abdominal CT of December   3, 2022 with adjacent bilateral lower lobe dependent opacities with   homogenous enhancement suggestive of atelectasis.    Perihepatic partially imaged small ascites new from 12/3/2022.    --- End of Report ---          SCHUYLER MAE DO; Resident Radiologist  This document has been electronically signed.    < end of copied text >    RESPIRATORY CULTURES:          Studies  Chest X-RAY  CT SCAN Chest   Venous Dopplers: LE:   CT Abdomen  Others

## 2022-12-12 NOTE — CONSULT NOTE ADULT - CONSULT REASON
colitis
Complex decision making in the setting of advanced illness
colitis on imaging
hypernatremia
sepsis, COVID, colitis
Hypotension
Sepsis

## 2022-12-12 NOTE — CONSULT NOTE ADULT - NS ATTEND AMEND GEN_ALL_CORE FT
No pain, no shortness of breath    Review of systems: All 10 points ROS was obtained except as above.     albuterol    90 MICROgram(s) HFA Inhaler 2 Puff(s) Inhalation every 6 hours PRN  budesonide 160 MICROgram(s)/formoterol 4.5 MICROgram(s) Inhaler 2 Puff(s) Inhalation two times a day  chlorhexidine 2% Cloths 1 Application(s) Topical daily  dexAMETHasone  Injectable 6 milliGRAM(s) IV Push daily  dextrose 5% + sodium chloride 0.45%. 1000 milliLiter(s) IV Continuous <Continuous>  dextrose 5%. 1000 milliLiter(s) IV Continuous <Continuous>  dextrose 5%. 1000 milliLiter(s) IV Continuous <Continuous>  dextrose 50% Injectable 25 Gram(s) IV Push once  dextrose 50% Injectable 12.5 Gram(s) IV Push once  dextrose 50% Injectable 25 Gram(s) IV Push once  dextrose Oral Gel 15 Gram(s) Oral once PRN  enoxaparin Injectable 40 milliGRAM(s) SubCutaneous every 24 hours  furosemide   Injectable 20 milliGRAM(s) IV Push once  glucagon  Injectable 1 milliGRAM(s) IntraMuscular once  insulin lispro (ADMELOG) corrective regimen sliding scale   SubCutaneous every 6 hours  mupirocin 2% Ointment 1 Application(s) Topical two times a day  pantoprazole  Injectable 40 milliGRAM(s) IV Push daily      VITAL:  T(C): , Max: 36.6 (12-11-22 @ 22:49)  T(F): , Max: 97.8 (12-11-22 @ 22:49)  HR: 88 (12-12-22 @ 20:30)  BP: 149/82 (12-12-22 @ 12:34)  BP(mean): --  RR: 18 (12-12-22 @ 20:30)  SpO2: 97% (12-12-22 @ 20:30)  Wt(kg): --    12-11-22 @ 07:01  -  12-12-22 @ 07:00  --------------------------------------------------------  IN: 600 mL / OUT: 500 mL / NET: 100 mL        PHYSICAL EXAM:  General: NAD; Alert  HEENT:  NCAT; PERRLA  Neck: No JVD; supple  Respiratory: CTA-b/l  Cardiac: RRR s1s2  Gastrointestinal: BS+, soft, NT/ND  Urologic: No chester  Extremities: No peripheral edema  Access:     LABS:                          9.6    7.54  )-----------( 145      ( 12 Dec 2022 07:53 )             30.0     Na(149)/K(3.7)/Cl(113)/HCO3(23)/BUN(30)/Cr(0.42)Glu(186)/Ca(7.7)/Mg(1.90)/PO4(2.2)    12-12 @ 06:55  Na(150)/K(4.0)/Cl(114)/HCO3(26)/BUN(30)/Cr(0.40)Glu(278)/Ca(7.3)/Mg(1.90)/PO4(2.3)    12-11 @ 19:28  Na(141)/K(5.0)/Cl(111)/HCO3(16)/BUN(33)/Cr(0.33)Glu(309)/Ca(7.2)/Mg(2.10)/PO4(4.0)    12-11 @ 07:10  Na(145)/K(3.3)/Cl(107)/HCO3(27)/BUN(35)/Cr(0.42)Glu(249)/Ca(7.3)/Mg(1.70)/PO4(1.8)    12-10 @ 06:44            ASSESSMENT/PLAN  Continue IVF. Will continue to monitor. Needs Sodium phosphate as well.
82F with PMH HTN, HLD, hyperthyroidism (untreated), recent hospitalization 11/22-11/26 for dysphagia and weakness found to have multinodular goiter presents with generalized weakness and poor PO intake. Family reported after patient left the hospital she was eating and drinking very little. Over the last week pt has become more lethargic and walking around less. Prior to previous hospitalization pt was independent and working. Family brought pt to hospital because they were worried about mental status and poor PO intake. No recent changes in medications. Had 1x episode of diarrhea prior to hospitalization. Pt now with COVID and failure to thrive. Palliative Care consulted for complex decision making in the setting of advanced illness.  Plan is to reach out tomorrow to both adult children and DIL to further discuss nutritional GOC  Page for uncontrolled symptoms [id number]

## 2022-12-12 NOTE — CONSULT NOTE ADULT - CONSULT REQUESTED DATE/TIME
04-Dec-2022 06:17
03-Dec-2022 17:55
08-Dec-2022 15:01
04-Dec-2022
12-Dec-2022 13:32
04-Dec-2022 07:31
08-Dec-2022 15:54

## 2022-12-12 NOTE — PROGRESS NOTE ADULT - SUBJECTIVE AND OBJECTIVE BOX
[facility] DIVISION OF INFECTIOUS DISEASES  JYOTI Dawson Y. Patel, ELOISE Colmenares  and providing coverage with Daren Coles MD  Providing Infectious Disease Consultations at [facility], [facility]'s      Office# [phone] to schedule follow up appointments  Answering Service for urgent calls or New Consults [phone]    Infectious Diseases Progress Note:  TIFFANY HU is a 82y year old Female patient    COVID-19 Patient  Remains on HFNC  Denies dyspnea or pain  No diarrhea per RN  Notes reviewed  No concerning events overnight.   Allergies: fish (Other)  penicillin (Hives)    ANTIBIOTICS/RELEVANT:  Current Antimicrobials: none  Prior/Completed Antimicrobials:  azithromycin  IVPB  cefepime   IVPB  remdesivir  IVPB  remdesivir  IVPB  remdesivir  IVPB  vancomycin  IVPB.    Other Meds: albuterol    90 MICROgram(s) HFA Inhaler 2 Puff(s) Inhalation every 6 hours PRN  budesonide 160 MICROgram(s)/formoterol 4.5 MICROgram(s) Inhaler 2 Puff(s) Inhalation two times a day  chlorhexidine 2% Cloths 1 Application(s) Topical daily  dexAMETHasone  Injectable 6 milliGRAM(s) IV Push daily  dextrose 5% + sodium chloride 0.45%. 1000 milliLiter(s) IV Continuous <Continuous>  dextrose 5%. 1000 milliLiter(s) IV Continuous <Continuous>  dextrose 5%. 1000 milliLiter(s) IV Continuous <Continuous>  dextrose 50% Injectable 25 Gram(s) IV Push once  dextrose 50% Injectable 12.5 Gram(s) IV Push once  dextrose 50% Injectable 25 Gram(s) IV Push once  dextrose Oral Gel 15 Gram(s) Oral once PRN  enoxaparin Injectable 40 milliGRAM(s) SubCutaneous every 24 hours  glucagon  Injectable 1 milliGRAM(s) IntraMuscular once  insulin lispro (ADMELOG) corrective regimen sliding scale   SubCutaneous every 6 hours  mupirocin 2% Ointment 1 Application(s) Topical two times a day    Immunologic:   Objective:  Vital Signs Last 24 Hrs  T(F): 97.7 (12 Dec 2022 02:30), Max: 97.8 (11 Dec 2022 22:49)  HR: 84 (12 Dec 2022 02:30) (76 - 84)  BP: 162/91 (12 Dec 2022 02:30) (157/75 - 162/91)  RR: 19 (12 Dec 2022 08:03) (18 - 19)  SpO2: 97% (12 Dec 2022 08:03) (94% - 97%)  PHYSICAL EXAM:  General: no acute distress, HFNC  HEENT: NC/AT, anicteric, supple  Respiratory: no acc muscle use, breathing comfortably  Cardiovascular: S1 S2 present, normal rate  Gastrointestinal: soft, nontender, nondistended  Extremities: LE edema, no cyanosis    LABS:                        9.6    7.54  )-----------( 145      ( 12 Dec 2022 07:53 )             30.0     WBC trend:  7.54 12-12 @ 07:53  8.29 12-11 @ 07:10  7.12 12-10 @ 06:44  6.13 12-09 @ 06:35  5.39 12-08 @ 17:29  6.31 12-08 @ 06:40  9.03 12-07 @ 06:13  10.64 12-06 @ 06:10    12-12    149<H>  |  113<H>  |  30<H>  ----------------------------<  186<H>  3.7   |  23  |  0.42<L>    Ca    7.7<L>      12 Dec 2022 06:55  Phos  2.2     12-12  Mg     1.90     12-12    TPro  4.5<L>  /  Alb  2.2<L>  /  TBili  0.5  /  DBili  <0.2  /  AST  26  /  ALT  36<H>  /  AlkPhos  130<H>  12-12    Creatinine, Serum: 0.42 mg/dL (12-12-22 @ 06:55)  Creatinine, Serum: 0.40 mg/dL (12-11-22 @ 19:28)  Creatinine, Serum: 0.33 mg/dL (12-11-22 @ 07:10)  Creatinine, Serum: 0.42 mg/dL (12-10-22 @ 06:44)  Creatinine, Serum: 0.45 mg/dL (12-09-22 @ 06:35)  Creatinine, Serum: 0.45 mg/dL (12-08-22 @ 17:29)  Creatinine, Serum: 0.46 mg/dL (12-08-22 @ 06:40)  Creatinine, Serum: 0.45 mg/dL (12-08-22 @ 06:40)  Creatinine, Serum: 0.54 mg/dL (12-07-22 @ 06:13)  Creatinine, Serum: 0.60 mg/dL (12-06-22 @ 13:45)  Creatinine, Serum: 0.66 mg/dL (12-06-22 @ 06:10)  Creatinine, Serum: 0.66 mg/dL (12-06-22 @ 06:10)  Creatinine, Serum: 0.70 mg/dL (12-05-22 @ 23:49)  Creatinine, Serum: 0.73 mg/dL (12-05-22 @ 17:20)    Auto Neutrophil #: 6.84 K/uL (12-12-22 @ 07:53)  Auto Neutrophil #: 10.67 K/uL (12-03-22 @ 15:07)    Auto Lymphocyte #: 0.43 K/uL (12-12-22 @ 07:53)  Auto Lymphocyte #: 0.23 K/uL (12-03-22 @ 15:07)    Procalcitonin, Serum: 0.29 ng/mL (12-12-22 @ 06:55)  Procalcitonin, Serum: 0.67 ng/mL (12-09-22 @ 06:35)    Ferritin, Serum: 778 ng/mL (12-12-22 @ 06:55)    D-Dimer Assay, Quantitative: 173 ng/mL DDU (12-12-22 @ 07:53)  D-Dimer Assay, Quantitative: 522 ng/mL DDU (12-09-22 @ 06:35)  D-Dimer Assay, Quantitative: 461 ng/mL DDU (12-08-22 @ 17:29)  D-Dimer Assay, Quantitative: 2105 ng/mL DDU (12-04-22 @ 07:05)    Lactate, Blood: 1.7 mmol/L (12-05-22 @ 23:49)  Lactate, Blood: 2.3 mmol/L (12-05-22 @ 16:50)  Lactate, Blood: 2.3 mmol/L (12-05-22 @ 15:55)  Lactate, Blood: 2.1 mmol/L (12-05-22 @ 11:16)  Lactate, Blood: 3.6 mmol/L (12-05-22 @ 06:00)  Lactate, Blood: 2.8 mmol/L (12-04-22 @ 11:20)    Prothrombin Time, Plasma: 15.1 sec (12-03-22 @ 14:14)    Activated Partial Thromboplastin Time: 31.2 sec (12-03-22 @ 14:14)    C-Reactive Protein, Serum: 14.7 mg/L (12-12-22 @ 06:55)    MICROBIOLOGY: reviewed  RADIOLOGY & ADDITIONAL STUDIES: reviewed

## 2022-12-12 NOTE — BH CONSULTATION LIAISON ASSESSMENT NOTE - NSBHCHARTREVIEWVS_PSY_A_CORE FT
Vital Signs Last 24 Hrs  T(C): 36.3 (12 Dec 2022 12:34), Max: 36.6 (11 Dec 2022 22:49)  T(F): 97.4 (12 Dec 2022 12:34), Max: 97.8 (11 Dec 2022 22:49)  HR: 80 (12 Dec 2022 12:34) (76 - 84)  BP: 149/82 (12 Dec 2022 12:34) (149/82 - 162/91)  BP(mean): --  RR: 19 (12 Dec 2022 12:34) (18 - 19)  SpO2: 98% (12 Dec 2022 12:34) (95% - 98%)    Parameters below as of 12 Dec 2022 12:34  Patient On (Oxygen Delivery Method): nasal cannula

## 2022-12-12 NOTE — PROGRESS NOTE ADULT - NSPROGADDITIONALINFOA_GEN_ALL_CORE
overall prognosis is poor. Family contemplating PEG.  Per CM note, the daughter in law Sharon requesting palliative eval for GOC and PEG discussion.  d/w NP. Palliative consulted.  d/w the daughter in law Sharon: all questions answered.    Will try to wean down hi-flow. can't do NG tube feeding on hi-flow.   Speech re-eval. reasonable to restart pureed diet if she passes.   If nutrition status poor and remain poor oral intake, temporary NG tube feeding once she is off of hi-flow.  May need PEG depending on goals of care.     Refusing speech eval again.  Less than 2 weeks ago, she passed for pureed diet  I discussed risk of aspiration and pt agreed to resume pureed.  also advised to allow full speech/swallow re-eval.   pt seems to be receptive.    Refusing another trial of speech eval.  nutrition consult for TPN  sugars high: on decadron and on D5, change fluid to NS    grossly volume overloaded  IV Lasix ordered  on IVF for hypernatremia  tricky without PO intake.  psyc seeing her.  prognosis is poor    - Dr. MARISELA Benavidez ([facility])  - [phone]

## 2022-12-12 NOTE — BH CONSULTATION LIAISON ASSESSMENT NOTE - MSE UNSTRUCTURED FT
Mental Status Exam:  John: well groomed, fair hygiene     Behavior: calm, cooperative, no psychomotor retardation/agitation  Motor: no tremors, EPS, or rigidity  Gait: did not assess, pt in bed  Speech: normal rate, rhythm, prosedy and volume   Mood: "okay"  Affect: euthymic, congruent  Thought process: clear, goal directed   Thought Content: denies paranoia, delusions   Perception: denies AH/VH  SI: denies  HI: denies  Insight: fair  Judgment: fair    Cognitive Exam:  Orientation: AOx3  Recall: intact  Attention: intact  Abstraction: intact

## 2022-12-12 NOTE — PROGRESS NOTE ADULT - PROBLEM SELECTOR PLAN 8
TSH 0.15, T4 535, T3 67  - multinodular goiter found on last admission   - endo on last admission rec'd to avoid contrast studies, had CT angio this admission  - endo c/s sent to [email or web address]  - no clinical signs of hyperthyroidism, no need for beta-blockade or methimazole for now

## 2022-12-12 NOTE — PROGRESS NOTE ADULT - PROBLEM SELECTOR PLAN 1
T 101.4, HR 120s. GI and PNA possible sources  - s/p vanc and cefepime in ED  - CT angio abd showed nonspecific enterocolitis with wall thickening of the descending and sigmoid segments of the colon and bilateral dependent pneumonia in the lower lobes  - COVID positive, refer to COVID section for plan  - no diarrhea  - initially received vanco (12/3- ), cefepime (12/3- ), azithro (12/4- )  - cont on cefepime; metronidazole added 12/5/22, completed abx course 12/8/22  - blood/urine cxs 12/3 (-)  - Legionella Ur Ag 12/4 (-)  - MRSA PCR 12/4 (+)

## 2022-12-12 NOTE — BH CONSULTATION LIAISON ASSESSMENT NOTE - NSBHADMITCOUNSEL_PSY_A_CORE
risks and benefits of treatment options/instructions for management, treatment and follow up/importance of adherence to chosen treatment/client/family/caregiver education/prognosis

## 2022-12-13 NOTE — PROGRESS NOTE ADULT - PROBLEM SELECTOR PLAN 3
Na 159->158  - Na 141 one week ago, likely 2/2 to poor PO intake  - S/p 2L IVF in ED  - free water deficit 3.3L  - monitor BMP daily  - careful correction no more than 12 mEq over first 24 hours  - 1L LR and start LR @ 75cc/hr  - cont D5LR @ 65 cc/hr  - encourage PO
Na 159->158  - Na 141 one week ago, likely 2/2 to poor PO intake  - S/p 2L IVF in ED  - free water deficit 3.3L  - monitor BMP daily  - careful correction no more than 12 mEq over first 24 hours  - 1L LR and start LR @ 75cc/hr  - cont D5LR @ 65 cc/hr --> D5W  - encourage PO
Na 159->158  - Na 141 one week ago, likely 2/2 to poor PO intake  - S/p 2L IVF in ED  - free water deficit 3.3L  - monitor BMP daily  - careful correction no more than 12 mEq over first 24 hours  - 1L LR and start LR @ 75cc/hr  - cont D5W @ 65 cc/hr  - encourage PO
Na 159->158  - Na 141 one week ago, likely 2/2 to poor PO intake  - S/p 2L IVF in ED  - free water deficit 3.3L  - monitor BMP daily  - careful correction no more than 12 mEq over first 24 hours  - 1L LR and start LR @ 75cc/hr  - cont D5LR @ 65 cc/hr  - encourage PO
Na 159->158  - Na 141 one week ago, likely 2/2 to poor PO intake  - S/p 2L IVF in ED  - free water deficit 3.3L  - monitor BMP daily  - careful correction no more than 12 mEq over first 24 hours  - 1L LR and start LR @ 75cc/hr  - cont D5LR @ 65 cc/hr  - encourage PO
Na 159->158  - Na 141 one week ago, likely 2/2 to poor PO intake  - S/p 2L IVF in ED  - free water deficit 3.3L  - monitor BMP q6   - careful correction no more than 12 mEq over first 24 hours  - 1L LR and start LR @ 75cc/hr  - consider starting D5 if Na hasn't lowered on next draw   - encourage PO
Na 159->158  - Na 141 one week ago, likely 2/2 to poor PO intake  - S/p 2L IVF in ED  - free water deficit 3.3L  - monitor BMP daily  - careful correction no more than 12 mEq over first 24 hours  - 1L LR and start LR @ 75cc/hr  - cont D5W @ 65 cc/hr  - encourage PO
Na 159->158  - Na 141 one week ago, likely 2/2 to poor PO intake  - S/p 2L IVF in ED  - free water deficit 3.3L  - monitor BMP daily  - careful correction no more than 12 mEq over first 24 hours  - 1L LR and start LR @ 75cc/hr  - cont D5LR @ 65 cc/hr  - encourage PO
Na 159->158  - Na 141 one week ago, likely 2/2 to poor PO intake  - S/p 2L IVF in ED  - free water deficit 3.3L  - monitor BMP daily  - careful correction no more than 12 mEq over first 24 hours  - 1L LR and start LR @ 75cc/hr  - cont D5LR @ 65 cc/hr  - encourage PO

## 2022-12-13 NOTE — PROGRESS NOTE ADULT - SUBJECTIVE AND OBJECTIVE BOX
[facility] DIVISION OF INFECTIOUS DISEASES  JYOTI Dawson Y. Patel, S. Shah, G. Casimir  and providing coverage with Daren Coles MD  Providing Infectious Disease Consultations at [facility], [facility]'s      Office# [phone] to schedule follow up appointments  Answering Service for urgent calls or New Consults [phone]    Infectious Diseases Progress Note:  TIFFANY HU is a 82y year old Female patient    COVID-19 Patient  Remains on HFNC  Notes reviewed  No concerning events overnight.   Allergies: fish (Other)  penicillin (Hives)    ANTIBIOTICS/RELEVANT:  Current Antimicrobials:    Prior/Completed Antimicrobials:  azithromycin  IVPB  cefepime   IVPB  remdesivir  IVPB  remdesivir  IVPB  remdesivir  IVPB  vancomycin  IVPB.    Other Meds: albuterol    90 MICROgram(s) HFA Inhaler 2 Puff(s) Inhalation every 6 hours PRN  budesonide 160 MICROgram(s)/formoterol 4.5 MICROgram(s) Inhaler 2 Puff(s) Inhalation two times a day  chlorhexidine 2% Cloths 1 Application(s) Topical daily  dextrose 5%. 1000 milliLiter(s) IV Continuous <Continuous>  dextrose 5%. 1000 milliLiter(s) IV Continuous <Continuous>  dextrose 5%. 1000 milliLiter(s) IV Continuous <Continuous>  dextrose 50% Injectable 25 Gram(s) IV Push once  dextrose 50% Injectable 12.5 Gram(s) IV Push once  dextrose 50% Injectable 25 Gram(s) IV Push once  dextrose Oral Gel 15 Gram(s) Oral once PRN  enoxaparin Injectable 40 milliGRAM(s) SubCutaneous every 24 hours  furosemide   Injectable 20 milliGRAM(s) IV Push once  glucagon  Injectable 1 milliGRAM(s) IntraMuscular once  insulin lispro (ADMELOG) corrective regimen sliding scale   SubCutaneous every 6 hours  mupirocin 2% Ointment 1 Application(s) Topical two times a day  pantoprazole  Injectable 40 milliGRAM(s) IV Push daily    Immunologic:   Objective:  Vital Signs Last 24 Hrs  T(F): 97.2 (13 Dec 2022 06:00), Max: 97.4 (12 Dec 2022 21:00)  HR: 96 (13 Dec 2022 09:19) (85 - 98)  BP: 100/57 (13 Dec 2022 06:00) (100/57 - 150/65)  RR: 20 (13 Dec 2022 09:19) (18 - 20)  SpO2: 96% (13 Dec 2022 09:19) (96% - 100%)  PHYSICAL EXAM:  General: no acute distress, HFNC  HEENT: NC/AT, anicteric, supple  Respiratory: no acc muscle use, breathing comfortably  Cardiovascular: S1 S2 present, normal rate  Gastrointestinal: normal appearing, nondistended  Extremities: +edema, no cyanosis    LABS:                        9.4    9.29  )-----------( 78       ( 13 Dec 2022 06:55 )             31.0     WBC trend:  9.29 12-13 @ 06:55  7.54 12-12 @ 07:53  8.29 12-11 @ 07:10  7.12 12-10 @ 06:44  6.13 12-09 @ 06:35  5.39 12-08 @ 17:29  6.31 12-08 @ 06:40  9.03 12-07 @ 06:13    12-13    152<H>  |  112<H>  |  41<H>  ----------------------------<  247<H>  4.6   |  18<L>  |  0.59    Ca    7.5<L>      13 Dec 2022 06:55  Phos  4.2     12-13  Mg     2.00     12-13    TPro  4.2<L>  /  Alb  2.0<L>  /  TBili  0.6  /  DBili  <0.2  /  AST  36<H>  /  ALT  35<H>  /  AlkPhos  127<H>  12-13    Creatinine, Serum: 0.59 mg/dL (12-13-22 @ 06:55)  Creatinine, Serum: 0.42 mg/dL (12-12-22 @ 06:55)  Creatinine, Serum: 0.40 mg/dL (12-11-22 @ 19:28)  Creatinine, Serum: 0.33 mg/dL (12-11-22 @ 07:10)  Creatinine, Serum: 0.42 mg/dL (12-10-22 @ 06:44)  Creatinine, Serum: 0.45 mg/dL (12-09-22 @ 06:35)  Creatinine, Serum: 0.45 mg/dL (12-08-22 @ 17:29)  Creatinine, Serum: 0.46 mg/dL (12-08-22 @ 06:40)  Creatinine, Serum: 0.45 mg/dL (12-08-22 @ 06:40)  Creatinine, Serum: 0.54 mg/dL (12-07-22 @ 06:13)  Creatinine, Serum: 0.60 mg/dL (12-06-22 @ 13:45)    Auto Neutrophil #: 8.60 K/uL (12-13-22 @ 06:55)  Auto Neutrophil #: 6.84 K/uL (12-12-22 @ 07:53)  Auto Neutrophil #: 10.67 K/uL (12-03-22 @ 15:07)    Auto Lymphocyte #: 0.41 K/uL (12-13-22 @ 06:55)  Auto Lymphocyte #: 0.43 K/uL (12-12-22 @ 07:53)  Auto Lymphocyte #: 0.23 K/uL (12-03-22 @ 15:07)    Procalcitonin, Serum: 0.29 ng/mL (12-12-22 @ 06:55)  Procalcitonin, Serum: 0.67 ng/mL (12-09-22 @ 06:35)    Ferritin, Serum: 778 ng/mL (12-12-22 @ 06:55)    D-Dimer Assay, Quantitative: 173 ng/mL DDU (12-12-22 @ 07:53)  D-Dimer Assay, Quantitative: 522 ng/mL DDU (12-09-22 @ 06:35)  D-Dimer Assay, Quantitative: 461 ng/mL DDU (12-08-22 @ 17:29)  D-Dimer Assay, Quantitative: 2105 ng/mL DDU (12-04-22 @ 07:05)    Lactate, Blood: 1.7 mmol/L (12-05-22 @ 23:49)  Lactate, Blood: 2.3 mmol/L (12-05-22 @ 16:50)  Lactate, Blood: 2.3 mmol/L (12-05-22 @ 15:55)  Lactate, Blood: 2.1 mmol/L (12-05-22 @ 11:16)  Lactate, Blood: 3.6 mmol/L (12-05-22 @ 06:00)  Lactate, Blood: 2.8 mmol/L (12-04-22 @ 11:20)    Prothrombin Time, Plasma: 15.1 sec (12-03-22 @ 14:14)    Activated Partial Thromboplastin Time: 31.2 sec (12-03-22 @ 14:14)    C-Reactive Protein, Serum: 14.7 mg/L (12-12-22 @ 06:55)    MICROBIOLOGY: reviewed  RADIOLOGY & ADDITIONAL STUDIES: reviewed

## 2022-12-13 NOTE — PROGRESS NOTE ADULT - PROBLEM SELECTOR PROBLEM 5
JOCELYN (acute kidney injury)

## 2022-12-13 NOTE — PROGRESS NOTE ADULT - ATTENDING COMMENTS
83 yo f dnr/i with septic shock briefly on pressors in setting of suspected PNA and colitis. COVID + on HFNC,  Hypernatremia. C/w broad spec abx , IVF as tolerated, npo pending surgical eval given ischemic bowel remains part of dd; follow official CT abd. Bedside and formal  swallow eval given possible aspiration. Decadron, D-dimer ordered to determine appropriate AC given covid +; remdesevir of renal function stable.
I have examined this patient, reviewed pertinent labs and imaging, and discussed the case with colleagues, residents, and physician assistants on B Team rounds.  See adjacent note by consult resident.    The active care issues are:  1. colitis    Continue non-operative medical management consistent with overall GOC.  Re-consult general surgery if needed.    The Acute Care Surgery (B Team) Practice:    urgent issues - spectra [id number]  nonurgent issues - [phone]  patient appointments or afterhours - [phone]
83 yo f dnr/i with septic shock briefly on pressors in setting of suspected PNA and colitis. COVID + on HFNC,  Hypernatremia. C/w broad spec abx , IVF as tolerated, npo pending surgical eval given ischemic bowel remains part of dd; follow official CT abd. Bedside and formal  swallow eval given possible aspiration. Decadron, D-dimer ordered to determine appropriate AC given covid +; remdesevir of renal function stable.

## 2022-12-13 NOTE — BH CONSULTATION LIAISON PROGRESS NOTE - CURRENT MEDICATION
MEDICATIONS  (STANDING):  budesonide 160 MICROgram(s)/formoterol 4.5 MICROgram(s) Inhaler 2 Puff(s) Inhalation two times a day  chlorhexidine 2% Cloths 1 Application(s) Topical daily  dextrose 5%. 1000 milliLiter(s) (50 mL/Hr) IV Continuous <Continuous>  dextrose 5%. 1000 milliLiter(s) (65 mL/Hr) IV Continuous <Continuous>  dextrose 5%. 1000 milliLiter(s) (100 mL/Hr) IV Continuous <Continuous>  dextrose 50% Injectable 25 Gram(s) IV Push once  dextrose 50% Injectable 12.5 Gram(s) IV Push once  dextrose 50% Injectable 25 Gram(s) IV Push once  enoxaparin Injectable 40 milliGRAM(s) SubCutaneous every 24 hours  glucagon  Injectable 1 milliGRAM(s) IntraMuscular once  insulin lispro (ADMELOG) corrective regimen sliding scale   SubCutaneous every 6 hours  mupirocin 2% Ointment 1 Application(s) Topical two times a day  pantoprazole  Injectable 40 milliGRAM(s) IV Push daily    MEDICATIONS  (PRN):  albuterol    90 MICROgram(s) HFA Inhaler 2 Puff(s) Inhalation every 6 hours PRN Shortness of Breath and/or Wheezing  dextrose Oral Gel 15 Gram(s) Oral once PRN Blood Glucose LESS THAN 70 milliGRAM(s)/deciliter

## 2022-12-13 NOTE — PROGRESS NOTE ADULT - NSPROGADDITIONALINFOA_GEN_ALL_CORE
overall prognosis is poor. Family contemplating PEG.  Per CM note, the daughter in law Sharon requesting palliative eval for GOC and PEG discussion.  d/w NP. Palliative consulted.  d/w the daughter in law Sharon: all questions answered.    Will try to wean down hi-flow. can't do NG tube feeding on hi-flow.   Speech re-eval. reasonable to restart pureed diet if she passes.   If nutrition status poor and remain poor oral intake, temporary NG tube feeding once she is off of hi-flow.  May need PEG depending on goals of care.     Refusing speech eval again.  Less than 2 weeks ago, she passed for pureed diet  I discussed risk of aspiration and pt agreed to resume pureed.  also advised to allow full speech/swallow re-eval.   pt seems to be receptive.    Refusing another trial of speech eval.  nutrition consult for TPN  sugars high: on decadron and on D5, change fluid to NS  now completed decadron.     grossly volume overloaded  IV Lasix ordered intermittently  on D5W IVF for hypernatremia  tricky without PO intake.  psyc seeing her. planning Olanzapine 2.5 mg  prognosis is poor    called back the daughter in law Sharon twice but went into voicemail. will call again in am.     - Dr. MARISELA Benavidez ([facility])  - [phone]

## 2022-12-13 NOTE — PROGRESS NOTE ADULT - PROBLEM SELECTOR PROBLEM 4
Hypercalcemia

## 2022-12-13 NOTE — PROGRESS NOTE ADULT - PROBLEM SELECTOR PLAN 4
Corrected Ca 11.7-> 10.7  - likely 2/2 to poor PO intake  - S/p 2L IVF in ED  - 1L LR and start LR @ 75cc/hr  - encourage PO

## 2022-12-13 NOTE — PROGRESS NOTE ADULT - PROBLEM SELECTOR PROBLEM 8
Hyperthyroidism

## 2022-12-13 NOTE — PROGRESS NOTE ADULT - PROBLEM SELECTOR PROBLEM 6
Abnormal CT scan

## 2022-12-13 NOTE — CHART NOTE - NSCHARTNOTEFT_GEN_A_CORE
Pt seen for Consult- TPN/ PPN / SEVERE MALNUTRITION FOLLOW UP     Medical Course: 81 y/o female with PMH HTN, HLD, hyperthyroidism (untreated), severe protein-calorie malnutrition, recent hospitalization 11/22-11/26 for dysphagia and weakness found to have multinodular goiter presents with generalized weakness likely 2/2 poor PO intake found to be septic and s/p short course of pressors for likely hypovolemic shock.      Nutrition Course: Unable to conduct nutrition interview 2/2 decreased cognition. Information obtained from comprehensive chart review and per RN today: No recent episodes of nausea, vomiting, diarrhea or constipation, BM noted on 12/10 per RN flowsheets. Pt continues NPO, No plan for tube-feeding/TPN as of yet. Pt seen by SLP on 12/10 and 12/11 per family request to be present during evaluation- per eval continue NPO and defer diet rx to MD. Will recommend consult nutrition support team for TPN recommendations (pager # [phone]).    Pt noted with hypernatremia- continues on IVF per MD orders.     Diet Prescription: Diet, NPO:   Except Medications  With Ice Chips/Sips of Water (12-11-22 @ 12:59)    Pertinent Medications: MEDICATIONS  (STANDING):  budesonide 160 MICROgram(s)/formoterol 4.5 MICROgram(s) Inhaler 2 Puff(s) Inhalation two times a day  chlorhexidine 2% Cloths 1 Application(s) Topical daily  dextrose 5%. 1000 milliLiter(s) (65 mL/Hr) IV Continuous <Continuous>  dextrose 5%. 1000 milliLiter(s) (100 mL/Hr) IV Continuous <Continuous>  dextrose 5%. 1000 milliLiter(s) (50 mL/Hr) IV Continuous <Continuous>  dextrose 50% Injectable 25 Gram(s) IV Push once  dextrose 50% Injectable 12.5 Gram(s) IV Push once  dextrose 50% Injectable 25 Gram(s) IV Push once  enoxaparin Injectable 40 milliGRAM(s) SubCutaneous every 24 hours  glucagon  Injectable 1 milliGRAM(s) IntraMuscular once  insulin lispro (ADMELOG) corrective regimen sliding scale   SubCutaneous every 6 hours  mupirocin 2% Ointment 1 Application(s) Topical two times a day  pantoprazole  Injectable 40 milliGRAM(s) IV Push daily    MEDICATIONS  (PRN):  albuterol    90 MICROgram(s) HFA Inhaler 2 Puff(s) Inhalation every 6 hours PRN Shortness of Breath and/or Wheezing  dextrose Oral Gel 15 Gram(s) Oral once PRN Blood Glucose LESS THAN 70 milliGRAM(s)/deciliter    Pertinent Labs: 12-13 Na152 mmol/L<H> Glu 247 mg/dL<H> K+ 4.6 mmol/L Cr  0.59 mg/dL BUN 41 mg/dL<H> 12-13 Phos 4.2 mg/dL 12-13 Alb 2.0 g/dL<L>  POCT Blood Glucose.: 319 mg/dL (13 Dec 2022 12:37)  POCT Blood Glucose.: 281 mg/dL (13 Dec 2022 12:28)  POCT Blood Glucose.: 232 mg/dL (13 Dec 2022 06:34)  POCT Blood Glucose.: 196 mg/dL (13 Dec 2022 00:42)  POCT Blood Glucose.: 213 mg/dL (12 Dec 2022 22:30)  POCT Blood Glucose.: 191 mg/dL (12 Dec 2022 18:02)      Weight: Height (cm): 167.6 (12-05 @ 04:40)  Weight (kg): 49.7 (12-05 @ 04:40)  BMI (kg/m2): 17.7 (12-05 @ 04:40)  Weight Assessment: No new weight to assess. Unable to obtain new weight as Pt bedscale is not working today.       Physical Assessment, per flowsheets:  Edema: Generalized, as observed today as well by RD  Skin: intact     Estimated Needs:   [X] No change since previous assessment      Previous Nutrition Diagnosis:  [ x] Malnutrition   Nutrition Diagnosis is [ x] ongoing  [ ] resolved [ ] not applicable   New Nutrition Diagnosis: [ x] not applicable     Interventions:   1) Contact nutrition support team for recommendations for TPN/PPN as needed  2) Obtain new weight  3) Reconsult RD as needed      Monitor & Evaluate:  tolerance to diet/supplement, nutrition related lab values, weight trends, BMs/GI distress, hydration status, skin integrity.    Lexis Cano MS, RDN (Pager #[phone]) | Also available on TEAMS

## 2022-12-13 NOTE — PROGRESS NOTE ADULT - SUBJECTIVE AND OBJECTIVE BOX
SUBJECTIVE/ OVERNIGHT EVENTS:  overloaded  Lasix x 1 again  Na high, on D5W IVF  pt confused   arm edematous  denied pain  states she doesn't want to eat    --------------------------------------------------------------------------------------------  LABS:                        9.4    9.29  )-----------( 78       ( 13 Dec 2022 06:55 )             31.0     12-13    152<H>  |  112<H>  |  41<H>  ----------------------------<  247<H>  4.6   |  18<L>  |  0.59    Ca    7.5<L>      13 Dec 2022 06:55  Phos  4.2     12-13  Mg     2.00     12-13    TPro  4.2<L>  /  Alb  2.0<L>  /  TBili  0.6  /  DBili  <0.2  /  AST  36<H>  /  ALT  35<H>  /  AlkPhos  127<H>  12-13      CAPILLARY BLOOD GLUCOSE      POCT Blood Glucose.: 251 mg/dL (13 Dec 2022 17:31)  POCT Blood Glucose.: 319 mg/dL (13 Dec 2022 12:37)  POCT Blood Glucose.: 281 mg/dL (13 Dec 2022 12:28)  POCT Blood Glucose.: 232 mg/dL (13 Dec 2022 06:34)  POCT Blood Glucose.: 196 mg/dL (13 Dec 2022 00:42)  POCT Blood Glucose.: 213 mg/dL (12 Dec 2022 22:30)            RADIOLOGY & ADDITIONAL TESTS:    Imaging Personally Reviewed:  [x] YES  [ ] NO    Consultant(s) Notes Reviewed:  [x] YES  [ ] NO    MEDICATIONS  (STANDING):  budesonide 160 MICROgram(s)/formoterol 4.5 MICROgram(s) Inhaler 2 Puff(s) Inhalation two times a day  chlorhexidine 2% Cloths 1 Application(s) Topical daily  dextrose 5%. 1000 milliLiter(s) (50 mL/Hr) IV Continuous <Continuous>  dextrose 5%. 1000 milliLiter(s) (65 mL/Hr) IV Continuous <Continuous>  dextrose 5%. 1000 milliLiter(s) (100 mL/Hr) IV Continuous <Continuous>  dextrose 50% Injectable 25 Gram(s) IV Push once  dextrose 50% Injectable 12.5 Gram(s) IV Push once  dextrose 50% Injectable 25 Gram(s) IV Push once  enoxaparin Injectable 40 milliGRAM(s) SubCutaneous every 24 hours  glucagon  Injectable 1 milliGRAM(s) IntraMuscular once  insulin lispro (ADMELOG) corrective regimen sliding scale   SubCutaneous every 6 hours  methylPREDNISolone sodium succinate Injectable 40 milliGRAM(s) IV Push every 8 hours  mupirocin 2% Ointment 1 Application(s) Topical two times a day  pantoprazole  Injectable 40 milliGRAM(s) IV Push daily    MEDICATIONS  (PRN):  albuterol    90 MICROgram(s) HFA Inhaler 2 Puff(s) Inhalation every 6 hours PRN Shortness of Breath and/or Wheezing  dextrose Oral Gel 15 Gram(s) Oral once PRN Blood Glucose LESS THAN 70 milliGRAM(s)/deciliter      Care Discussed with Consultants/Other Providers [x] YES  [ ] NO    Vital Signs Last 24 Hrs  T(C): 36.6 (13 Dec 2022 17:40), Max: 36.6 (13 Dec 2022 17:40)  T(F): 97.9 (13 Dec 2022 17:40), Max: 97.9 (13 Dec 2022 17:40)  HR: 78 (13 Dec 2022 17:40) (78 - 102)  BP: 99/47 (13 Dec 2022 17:40) (99/47 - 129/79)  BP(mean): --  RR: 19 (13 Dec 2022 17:40) (18 - 20)  SpO2: 94% (13 Dec 2022 17:40) (94% - 100%)    Parameters below as of 13 Dec 2022 17:40  Patient On (Oxygen Delivery Method): nasal cannula, high flow  O2 Flow (L/min): 55  O2 Concentration (%): 100  I&O's Summary    12 Dec 2022 07:01  -  13 Dec 2022 07:00  --------------------------------------------------------  IN: 200 mL / OUT: 300 mL / NET: -100 mL          PHYSICAL EXAM:  GENERAL: NAD, ill-appearing, thin, cachetic, on hi-flow O2  HEAD:  Atraumatic, normocephalic  EYES: EOMI, PERRLA, conjunctiva and sclera clear  NECK: Supple, no JVD, no carotid bruits  HEART: Regular rate and rhythm, no murmurs, rubs, or gallops  CHEST/LUNG: mild decrease breath sounds bilaterally; No wheeze   ABDOMEN: Soft, nondistended, epigastric tenderness, no hepatosplenomegaly  EXTREMITIES: 2+ peripheral pulses bilaterally. No clubbing, cyanosis, or edema  NERVOUS SYSTEM:  A&Ox1-2, no focal posturing

## 2022-12-13 NOTE — CHART NOTE - NSCHARTNOTESELECT_GEN_ALL_CORE
Event Note
Endocrinology/Event Note
Event Note
Event Note
Low O2 sat/Event Note
Nutrition Services
Nutrition Services

## 2022-12-13 NOTE — PROGRESS NOTE ADULT - SUBJECTIVE AND OBJECTIVE BOX
[facility]   MD DISHA GERMAIN MD KRISTINE SOLTANPOUR, DO ANGELA WONG, PA    TEL:  OFFICE: [phone]  From 5pm-7am Answering Service [phone]    -- RENAL FOLLOW UP NOTE ---Date of Service 12-13-22 @ 10:25    Patient is a 82y old  Female who presents with a chief complaint of sepsis (12 Dec 2022 15:12)      Patient seen and examined at bedside.     VITALS:  T(F): 97.2 (12-13-22 @ 06:00), Max: 97.4 (12-12-22 @ 12:34)  HR: 96 (12-13-22 @ 09:19)  BP: 100/57 (12-13-22 @ 06:00)  RR: 20 (12-13-22 @ 09:19)  SpO2: 96% (12-13-22 @ 09:19)  Wt(kg): --    12-12 @ 07:01  -  12-13 @ 07:00  --------------------------------------------------------  IN: 200 mL / OUT: 300 mL / NET: -100 mL          PHYSICAL EXAM:  General: on high flow  Neck: No JVD  Respiratory: poor air entry  Cardiovascular: S1, S2, RRR  Gastrointestinal: BS+, soft, NT/ND  Extremities: + peripheral edema UE R>L    Hospital Medications:   MEDICATIONS  (STANDING):  budesonide 160 MICROgram(s)/formoterol 4.5 MICROgram(s) Inhaler 2 Puff(s) Inhalation two times a day  chlorhexidine 2% Cloths 1 Application(s) Topical daily  dextrose 5%. 1000 milliLiter(s) (50 mL/Hr) IV Continuous <Continuous>  dextrose 5%. 1000 milliLiter(s) (65 mL/Hr) IV Continuous <Continuous>  dextrose 5%. 1000 milliLiter(s) (100 mL/Hr) IV Continuous <Continuous>  dextrose 50% Injectable 25 Gram(s) IV Push once  dextrose 50% Injectable 12.5 Gram(s) IV Push once  dextrose 50% Injectable 25 Gram(s) IV Push once  enoxaparin Injectable 40 milliGRAM(s) SubCutaneous every 24 hours  glucagon  Injectable 1 milliGRAM(s) IntraMuscular once  insulin lispro (ADMELOG) corrective regimen sliding scale   SubCutaneous every 6 hours  mupirocin 2% Ointment 1 Application(s) Topical two times a day  pantoprazole  Injectable 40 milliGRAM(s) IV Push daily      LABS:  12-13    152<H>  |  112<H>  |  41<H>  ----------------------------<  247<H>  4.6   |  18<L>  |  0.59    Ca    7.5<L>      13 Dec 2022 06:55  Phos  4.2     12-13  Mg     2.00     12-13    TPro  4.2<L>  /  Alb  2.0<L>  /  TBili  0.6  /  DBili  <0.2  /  AST  36<H>  /  ALT  35<H>  /  AlkPhos  127<H>  12-13    Creatinine Trend: 0.59 <--, 0.42 <--, 0.40 <--, 0.33 <--, 0.42 <--, 0.45 <--, 0.45 <--, 0.46 <--, 0.54 <--, 0.60 <--    Phosphorus Level, Serum: 4.2 mg/dL (12-13 @ 06:55)  Albumin, Serum: 2.0 g/dL (12-13 @ 06:55)    calcium--  intact [id number]  parathyroid hormone intact, serum--                            9.4    9.29  )-----------( x        ( 13 Dec 2022 06:55 )             31.0     Urine Studies:  Urinalysis - [12-03-22 @ 14:26]      Color Yellow / Appearance Slightly Turbid / SG 1.022 / pH 6.0      Gluc Negative / Ketone Small  / Bili Small / Urobili <2 mg/dL       Blood Trace / Protein 30 mg/dL / Leuk Est Negative / Nitrite Negative      RBC 10 / WBC 14 / Hyaline 6-10 / Gran  / Sq Epi  / Non Sq Epi 12 / Bacteria Negative      Ferritin 778      [12-12-22 @ 06:55]  PTH -- (Ca --)      [12-13-22 @ 06:55]   67  Vitamin D (25OH) 45.4      [11-24-22 @ 05:40]  TSH 0.80      [12-09-22 @ 06:35]        RADIOLOGY & ADDITIONAL STUDIES:

## 2022-12-13 NOTE — PROGRESS NOTE ADULT - PROBLEM SELECTOR PLAN 9
Home med: losartan 100mg   - hold home medications i/s/o sepsis and hypovolemia

## 2022-12-13 NOTE — PROGRESS NOTE ADULT - SUBJECTIVE AND OBJECTIVE BOX
Date of Service: 12-13-22 @ 16:40    Patient is a 82y old  Female who presents with a chief complaint of sepsis (13 Dec 2022 12:40)      Any change in ROS:  doing poorly:  on 100% high flownow:     MEDICATIONS  (STANDING):  budesonide 160 MICROgram(s)/formoterol 4.5 MICROgram(s) Inhaler 2 Puff(s) Inhalation two times a day  chlorhexidine 2% Cloths 1 Application(s) Topical daily  dextrose 5%. 1000 milliLiter(s) (50 mL/Hr) IV Continuous <Continuous>  dextrose 5%. 1000 milliLiter(s) (65 mL/Hr) IV Continuous <Continuous>  dextrose 5%. 1000 milliLiter(s) (100 mL/Hr) IV Continuous <Continuous>  dextrose 50% Injectable 25 Gram(s) IV Push once  dextrose 50% Injectable 12.5 Gram(s) IV Push once  dextrose 50% Injectable 25 Gram(s) IV Push once  enoxaparin Injectable 40 milliGRAM(s) SubCutaneous every 24 hours  glucagon  Injectable 1 milliGRAM(s) IntraMuscular once  insulin lispro (ADMELOG) corrective regimen sliding scale   SubCutaneous every 6 hours  mupirocin 2% Ointment 1 Application(s) Topical two times a day  pantoprazole  Injectable 40 milliGRAM(s) IV Push daily    MEDICATIONS  (PRN):  albuterol    90 MICROgram(s) HFA Inhaler 2 Puff(s) Inhalation every 6 hours PRN Shortness of Breath and/or Wheezing  dextrose Oral Gel 15 Gram(s) Oral once PRN Blood Glucose LESS THAN 70 milliGRAM(s)/deciliter    Vital Signs Last 24 Hrs  T(C): 36.3 (13 Dec 2022 12:59), Max: 36.3 (12 Dec 2022 21:00)  T(F): 97.4 (13 Dec 2022 12:59), Max: 97.4 (12 Dec 2022 21:00)  HR: 102 (13 Dec 2022 12:59) (79 - 102)  BP: 118/63 (13 Dec 2022 12:59) (100/57 - 150/65)  BP(mean): --  RR: 20 (13 Dec 2022 15:07) (18 - 20)  SpO2: 100% (13 Dec 2022 15:07) (96% - 100%)    Parameters below as of 13 Dec 2022 15:07  Patient On (Oxygen Delivery Method): nasal cannula, high flow  O2 Flow (L/min): 55  O2 Concentration (%): 100    I&O's Summary    12 Dec 2022 07:01  -  13 Dec 2022 07:00  --------------------------------------------------------  IN: 200 mL / OUT: 300 mL / NET: -100 mL          Physical Exam:   GENERAL: NAD, well-groomed, well-developed  HEENT: SHARON/   Atraumatic, Normocephalic  ENMT: No tonsillar erythema, exudates, or enlargement; Moist mucous membranes, Good dentition, No lesions  NECK: Supple, No JVD, Normal thyroid  CHEST/LUNG: yariel basal cracekls+  CVS: Regular rate and rhythm; No murmurs, rubs, or gallops  GI: : Soft, Nontender, Nondistended; Bowel sounds present  NERVOUS SYSTEM:  sleepy butnot in any resp distress:   EXTREMITIES: - edema  LYMPH: No lymphadenopathy noted  SKIN: No rashes or lesions  ENDOCRINOLOGY: No Thyromegaly  PSYCH: calm    Labs:                              9.4    9.29  )-----------( 78       ( 13 Dec 2022 06:55 )             31.0                         9.6    7.54  )-----------( 145      ( 12 Dec 2022 07:53 )             30.0                         10.6   8.29  )-----------( 125      ( 11 Dec 2022 07:10 )             35.1                         12.4   7.12  )-----------( 107      ( 10 Dec 2022 06:44 )             38.8     12-13    152<H>  |  112<H>  |  41<H>  ----------------------------<  247<H>  4.6   |  18<L>  |  0.59  12-12    149<H>  |  113<H>  |  30<H>  ----------------------------<  186<H>  3.7   |  23  |  0.42<L>  12-11    150<H>  |  114<H>  |  30<H>  ----------------------------<  278<H>  4.0   |  26  |  0.40<L>  12-11    141  |  111<H>  |  33<H>  ----------------------------<  309<H>  5.0   |  16<L>  |  0.33<L>  12-10    145  |  107  |  35<H>  ----------------------------<  249<H>  3.3<L>   |  27  |  0.42<L>    Ca    7.5<L>      13 Dec 2022 06:55  Ca    7.7<L>      12 Dec 2022 06:55  Ca    7.3<L>      11 Dec 2022 19:28  Phos  4.2     12-13  Phos  2.2     12-12  Phos  2.3     12-11  Mg     2.00     12-13  Mg     1.90     12-12  Mg     1.90     12-11    TPro  4.2<L>  /  Alb  2.0<L>  /  TBili  0.6  /  DBili  <0.2  /  AST  36<H>  /  ALT  35<H>  /  AlkPhos  127<H>  12-13  TPro  4.5<L>  /  Alb  2.2<L>  /  TBili  0.5  /  DBili  <0.2  /  AST  26  /  ALT  36<H>  /  AlkPhos  130<H>  12-12    CAPILLARY BLOOD GLUCOSE      POCT Blood Glucose.: 319 mg/dL (13 Dec 2022 12:37)  POCT Blood Glucose.: 281 mg/dL (13 Dec 2022 12:28)  POCT Blood Glucose.: 232 mg/dL (13 Dec 2022 06:34)  POCT Blood Glucose.: 196 mg/dL (13 Dec 2022 00:42)  POCT Blood Glucose.: 213 mg/dL (12 Dec 2022 22:30)  POCT Blood Glucose.: 191 mg/dL (12 Dec 2022 18:02)      LIVER FUNCTIONS - ( 13 Dec 2022 06:55 )  Alb: 2.0 g/dL / Pro: 4.2 g/dL / ALK PHOS: 127 U/L / ALT: 35 U/L / AST: 36 U/L / GGT: x               D-Dimer Assay, Quantitative: 173 ng/mL DDU (12-12 @ 07:53)  D-Dimer Assay, Quantitative: 522 ng/mL DDU (12-09 @ 06:35)  D-Dimer Assay, Quantitative: 461 ng/mL DDU (12-08 @ 17:29)  Procalcitonin, Serum: 0.29 ng/mL (12-12 @ 06:55)        RECENT CULTURES:    rad< from: Xray Chest 1 View- PORTABLE-Urgent (Xray Chest 1 View- PORTABLE-Urgent .) (12.12.22 @ 11:34) >    INTERPRETATION:  EXAMINATION: XR CHEST URGENT    CLINICAL INDICATION: Shortness of breath.    TECHNIQUE: 2 views; Frontal and lateral views of the chest were obtained.    COMPARISON: Chest x-ray 12/8/2022, CT chest 12/8/2022.    FINDINGS:  The cardiomediastinal silhouette is normal in size.  Diffuse basilar predominant bilateral reticular opacities.  Small left pleural effusion with associated passive atelectasis.  There is no pneumothorax.  No acute bony abnormality.    IMPRESSION:  *  Diffuse basilar predominant bilateral reticular opacities, likely due   to patient's extensive emphysema seen on comparison CT chest.  *  Small left pleural effusion.    --- End of Report ---          LEODAN HESTER MD; Resident Radiologist  This document has been electronically signed.  LEODAN CALDERÓN MD; Attending Radiologist  This document has been electronically signed. Dec 12 2022  2:04PM    < end of copied text >      RESPIRATORY CULTURES:          Studies  Chest X-RAY  CT SCAN Chest   Venous Dopplers: LE:   CT Abdomen  Others

## 2022-12-13 NOTE — PROGRESS NOTE ADULT - PROBLEM SELECTOR PROBLEM 7
Elevated troponin

## 2022-12-13 NOTE — BH CONSULTATION LIAISON PROGRESS NOTE - NSBHFUPINTERVALHXFT_PSY_A_CORE
Patient was seen and assessed at bedside. Patient is in and out of sleep throughout interview. Appears to be in pain - - speaks to provider although in quiet, broken phrases-asks provider to move her leg - right knee with notable redness. Patient was able to follow simple commands such as squeeze my hand and open your eyes, however interview was limited due to state of arousal.

## 2022-12-13 NOTE — CHART NOTE - NSCHARTNOTEFT_GEN_A_CORE
9.4    9.29  )-----------( 78       ( 13 Dec 2022 06:55 )             31.0   12-13    152<H>  |  112<H>  |  41<H>  ----------------------------<  247<H>  4.6   |  18<L>  |  0.59    Ca    7.5<L>      13 Dec 2022 06:55  Phos  4.2     12-13  Mg     2.00     12-13    TPro  4.2<L>  /  Alb  2.0<L>  /  TBili  0.6  /  DBili  <0.2  /  AST  36<H>  /  ALT  35<H>  /  AlkPhos  127<H>  12-13    Na noted 152, discussed with nephkelly Jordan this morning> recommend D5 @ 65cc, will repeat BMP this evening.  Ordered UE duplex ro dvt given swelling.   Patient on HFNC (See flowsheet), discussed CXR results and case with Pulm Dr. White> continue 5more days of decadron, increased ISS given hyperglycemia.  Spoke with Yu Roberts> will plan further discussion tomorrow with patient's family regarding ?hospice given poor clinical improvement.   Lab results and case discussed with Dr. Benavidez, close monitoring ongoing. 9.4    9.29  )-----------( 78       ( 13 Dec 2022 06:55 )             31.0   12-13    152<H>  |  112<H>  |  41<H>  ----------------------------<  247<H>  4.6   |  18<L>  |  0.59    Ca    7.5<L>      13 Dec 2022 06:55  Phos  4.2     12-13  Mg     2.00     12-13    TPro  4.2<L>  /  Alb  2.0<L>  /  TBili  0.6  /  DBili  <0.2  /  AST  36<H>  /  ALT  35<H>  /  AlkPhos  127<H>  12-13    Na noted 152, discussed with nephro HECTOR Jordan this morning> recommend D5 @ 65cc, will repeat BMP this evening.  Ordered UE duplex ro dvt given swelling.   Patient on HFNC (See flowsheet), discussed CXR results and case with Pulm Dr. White> continue 5more days of decadron, increased ISS given hyperglycemia.  Spoke with Yu Roberts> will plan further discussion tomorrow with patient's family regarding ?hospice given poor clinical improvement.   Lab results and case discussed with Dr. Benavidez, IV lasix 20mg given as discussed, close monitoring ongoing.

## 2022-12-13 NOTE — BH CONSULTATION LIAISON PROGRESS NOTE - NSBHASSESSMENTFT_PSY_ALL_CORE
82 F with PMH HTN, HLD, hyperthyroidism (untreated), severe protein-calorie malnutrition, recent hospitalization 11/22-11/26 for dysphagia and weakness found to have multinodular goiter presents with generalized weakness likely 2/2 poor PO intake found to be septic and s/p short course of pressors for likely hypovolemic shock, psych c/s for anxiety/fear surrounding oral exam, PO intake etc.    Patient exam very limited - suspect possible paranoia surrounding opening mouth, would begin meds as below. Olanzapine may help increase appetite, help off label with anxiety and with paranoia. As patient has fear of nausea/vomiting, olanzapine may ppx nausea as well. No suspicion for SI/HI. Patient may need IM/IV meds, no IV form of SSRI's available would use antipsychotics instead as these have IM/IV form.     12/13: limited interview, lethargic, appears to be delirious at this time. medical team to address leg pain     Recs:  - hold off on starting olanzapine 2.5mg qHS until daughter discusses with rest of family  - palliative care f/u appreciated  - psych will follow

## 2022-12-13 NOTE — PROGRESS NOTE ADULT - PROBLEM SELECTOR PLAN 5
Baseline 0.04-0.6  - Cr 1.35  - likely 2/2 to poor PO intake  - c/w IVF  - encourage PO  - trend Cr

## 2022-12-13 NOTE — PROGRESS NOTE ADULT - PROVIDER SPECIALTY LIST ADULT
Infectious Disease
Internal Medicine
Surgery
Infectious Disease
Pulmonology
Pulmonology
Infectious Disease
Nephrology
Pulmonology
Pulmonology
Internal Medicine

## 2022-12-13 NOTE — PROGRESS NOTE ADULT - ASSESSMENT
82F with PMH HTN, HLD, hyperthyroidism (untreated), recent hospitalization 11/22-11/26 for dysphagia and weakness found to have multinodular goiter presents with generalized weakness and poor PO intake. nephrology consulted for hypernatremia    HYpernatremia  likely sec to dehydration due to poor po intake  na worsen. given lasix 20 iv x1 12/12 for edema  continue d5w.   pt still npo  avoid overcorrection  monitor    hypocalcemia  check pth, vit d 1,25  low alb  monitor    hypophos  supplemented  better  monitor    UE edema  R>L  r/o DVT
Patient is a 82 year old female with PMH HTN, HLD, hyperthyroidism (untreated), recent hospitalization 11/22-11/26 for dysphagia and weakness found to have multinodular goiter on last admission presents with generalized weakness, worsening lethargy and poor PO intake with an episode of diarrhea at home.   In the ER, patient febrile 101.4F, hypoxic and placed on HFNC.   Was briefly on levophed for septic shock for persistent hypotension, wean off after 2L IVF.   +COVID PCR in ER -- pt COVID vaccinated x3; sx onset unclear  CTAP with colitis of descending and sigmoid colon - ischemic vs infectious vs inflammatory bowel disease; bibasilar atelectasis vs pneumonia  s/p surgical eval for c/f ischemic bowel, no intervention, serial abd exams  S/p cefepime, vancomycin    Sepsis, AHRF due to COVID-19 pneumonia  Colitis - possible ischemic vs infectious vs IBD  - afebrile now, on HFNC, BP improved, WBC stable   - CXR with chronic lungs changes, no infiltrates  - CTA chest with no PE, new small b/l pleural effusions with atelectasis, new small ascites   - Pulmonary following, started on symbicort and ventolin for emphysema  - s/p remdesivir x5d course - completed 12/8  - s/p cefepime and metronidazole x5d course  - completed 12/8  - monitor off antibiotics  - continue on dexamethasone x10d course (12/4-12/13)  - if any diarrhea - send culture, GI stool PCR, C.diff (none so far noted)  - isolation per infection control protocol  - wean supplemental O2 as tolerated   - monitor clinically, temps, CBC, inflammatory markers     Joseph Cox M.D.  [facility], Division of Infectious Diseases  [phone]  After 5pm on weekdays and all day on weekends - please call [phone] 
Patient is a 82 year old female with PMH HTN, HLD, hyperthyroidism (untreated), recent hospitalization 11/22-11/26 for dysphagia and weakness found to have multinodular goiter on last admission presents with generalized weakness, worsening lethargy and poor PO intake with an episode of diarrhea at home.   In the ER, patient febrile 101.4F, hypoxic and placed on HFNC.   Was briefly on levophed for septic shock for persistent hypotension, wean off after 2L IVF.   +COVID PCR in ER -- pt COVID vaccinated x3; sx onset unclear  CTAP with colitis of descending and sigmoid colon - ischemic vs infectious vs inflammatory bowel disease; bibasilar atelectasis vs pneumonia  s/p surgical eval for c/f ischemic bowel, no intervention, serial abd exams  S/p cefepime, vancomycin    Sepsis, AHRF due to COVID-19 pneumonia  Colitis - possible ischemic vs infectious vs IBD  - afebrile now, on HFNC, BP improved, WBC stable   - CXR with chronic lungs changes, no infiltrates  - CTA chest with no PE, new small b/l pleural effusions with atelectasis, new small ascites   - Pulmonary following, started on symbicort and ventolin for emphysema  - s/p remdesivir x5d course - completed 12/8  - s/p cefepime and metronidazole x5d course  - completed 12/8  - monitor off antibiotics  - continue on dexamethasone x10d course (12/4-12/13)  - if any diarrhea - send culture, GI stool PCR, C.diff (none so far noted)  - isolation per infection control protocol  - wean supplemental O2 as tolerated   - monitor clinically, temps, CBC, inflammatory markers     Over the weekend Dr. Joseph Cox will be covering for our group.  If you have any questions, concerns or new micro data, please reach out to them at [phone].    D/w Dr. Pramod Quinones M.D.  [facility], Division of Infectious Diseases  [phone]  After 5pm on weekdays and all day on weekends - please call [phone]
Patient is a 82 year old female with PMH HTN, HLD, hyperthyroidism (untreated), recent hospitalization 11/22-11/26 for dysphagia and weakness found to have multinodular goiter on last admission presents with generalized weakness, worsening lethargy and poor PO intake with an episode of diarrhea at home.   In the ER, patient febrile 101.4F, hypoxic and placed on HFNC.   Was briefly on levophed for septic shock for persistent hypotension, wean off after 2L IVF.   +COVID PCR in ER -- pt COVID vaccinated x3; sx onset unclear  CTAP with colitis of descending and sigmoid colon - ischemic vs infectious vs inflammatory bowel disease; bibasilar atelectasis vs pneumonia  s/p surgical eval for c/f ischemic bowel, no intervention, serial abd exams  S/p cefepime, vancomycin    Sepsis, AHRF due to COVID-19 pneumonia  Colitis - possible ischemic vs infectious vs IBD  - afebrile now, on HFNC, BP improved, WBC stable   - CXR with chronic lungs changes, no infiltrates  - CTA chest with no PE, new small b/l pleural effusions with atelectasis, new small ascites   - Pulmonary following, started on symbicort and ventolin for emphysema  - s/p remdesivir x5d course - completed 12/8  - s/p cefepime and metronidazole x5d course  - completed 12/8  - monitor off antibiotics  - continue on dexamethasone x10d course (12/4-12/13)  - isolation per infection control protocol  - wean supplemental O2 as tolerated   - monitor clinically, temps, CBC, inflammatory markers   aspiration precautions    Joseph Cox M.D.  [facility], Division of Infectious Diseases  [phone]  After 5pm on weekdays and all day on weekends - please call [phone] 
Patient is a 82 year old female with PMH HTN, HLD, hyperthyroidism (untreated), recent hospitalization 11/22-11/26 for dysphagia and weakness found to have multinodular goiter presents with generalized weakness, worsening lethargy and poor PO intake with an episode of diarrhea at home.   In the ER, patient febrile 101.4F, hypoxic and placed on HFNC.   Was briefly on levophed for septic shock for persistent hypotension, wean off after 2L IVF.   CTAP with colitis of descending and sigmoid colon - ischemic vs infectious vs inflammatory bowel disease; bibasilar atelectasis vs pneumonia   +COVID PCR in ER -- pt COVID vaccinated x3; sx onset unclear  S/p cefepime, vancomycin    Sepsis, AHRF due to COVID-19 pneumonia  Colitis - possible ischemic vs infectious vs IBD  - afebrile now, on HFNC, BP improved, WBC stable   - CXR with chronic lungs changes, no infiltrates  - continue remdesivir x3d course - end today 12/6  - continue on dexamethasone x10d course  - GI following, no plan for colonoscopy at this time   - surgical eval for c/f ischemic bowel, no intervention, serial abd exams  - continue cefepime and add metronidazole for possible infectious colitis   - if any diarrhea - send culture, GI stool PCR, C.diff (none so far noted)  - isolation per infection control protocol  - wean supplemental O2 as tolerated   - monitor clinically, temps, CBC, inflammatory markers       Maxx Quinones M.D.  [facility], Division of Infectious Diseases  [phone]  After 5pm on weekdays and all day on weekends - please call [phone]
Patient is a 82 year old female with PMH HTN, HLD, hyperthyroidism (untreated), recent hospitalization 11/22-11/26 for dysphagia and weakness found to have multinodular goiter on last admission presents with generalized weakness, worsening lethargy and poor PO intake with an episode of diarrhea at home.   In the ER, patient febrile 101.4F, hypoxic and placed on HFNC.   Was briefly on levophed for septic shock for persistent hypotension, wean off after 2L IVF.   +COVID PCR in ER -- pt COVID vaccinated x3; sx onset unclear  CTAP with colitis of descending and sigmoid colon - ischemic vs infectious vs inflammatory bowel disease; bibasilar atelectasis vs pneumonia  s/p surgical eval for c/f ischemic bowel, no intervention, serial abd exams  S/p cefepime, vancomycin    AHRF due to COVID-19 pneumonia, COPD  Colitis - possible ischemic vs infectious vs IBD  - sepsis on admission, resolved; remains on HFNC  - CTA chest with no PE, new small b/l pleural effusions with atelectasis, new small ascites   - Pulmonary following, on symbicort and ventolin for emphysema  - s/p remdesivir x5d course - completed 12/8  - s/p dexamethasone x10d course (12/4-12/13)  - s/p cefepime and metronidazole x5d - completed 12/8  - continue to monitor off antibiotics  - wean supplemental O2 as tolerated   - aspiration precautions      D/w Dr. Pramod Quinones M.D.  [facility], Division of Infectious Diseases  [phone]  After 5pm on weekdays and all day on weekends - please call [phone]  
Patient is a 82 year old female with PMH HTN, HLD, hyperthyroidism (untreated), recent hospitalization 11/22-11/26 for dysphagia and weakness found to have multinodular goiter presents with generalized weakness, worsening lethargy and poor PO intake with an episode of diarrhea at home.   In the ER, patient febrile 101.4F, hypoxic and placed on HFNC.   Was briefly on levophed for septic shock for persistent hypotension, wean off after 2L IVF.   CTAP with colitis of descending and sigmoid colon - ischemic vs infectious vs inflammatory bowel disease; bibasilar atelectasis vs pneumonia   +COVID PCR in ER -- pt COVID vaccinated x3; sx onset unclear  S/p cefepime, vancomycin    Sepsis, AHRF due to COVID-19 pneumonia  Colitis - possible ischemic vs infectious vs IBD  - afebrile now, on HFNC, BP improved, WBC stable   - CXR with chronic lungs changes, no infiltrates  - continue remdesivir x5d course - end today 12/8  - continue on dexamethasone x10d course  - GI following, no plan for colonoscopy at this time   - surgical eval for c/f ischemic bowel, no intervention, serial abd exams  - continue cefepime and add metronidazole for possible infectious colitis     -- will plan to complete total 5d course - end today 12/8  - if any diarrhea - send culture, GI stool PCR, C.diff (none so far noted)  - isolation per infection control protocol  - wean supplemental O2 as tolerated   - monitor clinically, temps, CBC, inflammatory markers       Maxx Quinones M.D.  [facility], Division of Infectious Diseases  [phone]  After 5pm on weekdays and all day on weekends - please call [phone]
82F with PMH HTN, HLD, hyperthyroidism (untreated), recent hospitalization 11/22-11/26 for dysphagia and weakness found to have multinodular goiter presents with generalized weakness and poor PO intake. CTA demonstrating colitis of descending and sigmoid colon.    Impression is ischemic vs infection colitis. GI noting no role for colonoscopy at this time    Plan:  - No acute surgical intervention at this time  - Continue IV fluid resuscitation  - Continue serial abdominal exams      B Team Surgery  [phone]  
Patient is a 82 year old female with PMH HTN, HLD, hyperthyroidism (untreated), recent hospitalization 11/22-11/26 for dysphagia and weakness found to have multinodular goiter on last admission presents with generalized weakness, worsening lethargy and poor PO intake with an episode of diarrhea at home.   In the ER, patient febrile 101.4F, hypoxic and placed on HFNC.   Was briefly on levophed for septic shock for persistent hypotension, wean off after 2L IVF.   +COVID PCR in ER -- pt COVID vaccinated x3; sx onset unclear  CTAP with colitis of descending and sigmoid colon - ischemic vs infectious vs inflammatory bowel disease; bibasilar atelectasis vs pneumonia  s/p surgical eval for c/f ischemic bowel, no intervention, serial abd exams  S/p cefepime, vancomycin    AHRF due to COVID-19 pneumonia, COPD  Colitis - possible ischemic vs infectious vs IBD  - sepsis on admission, resolved; remains on HFNC   - afebrile, WBC normalized  - CXR with chronic lungs changes, no infiltrates  - CTA chest with no PE, new small b/l pleural effusions with atelectasis, new small ascites   - Pulmonary following, started on symbicort and ventolin for emphysema  - s/p remdesivir x5d course - completed 12/8  - continue on dexamethasone x10d course (12/4-12/13)  - s/p cefepime and metronidazole x5d course  - completed 12/8  - continue to monitor off antibiotics  - wean supplemental O2 as tolerated   - aspiration precautions      Maxx Quinones M.D.  [facility], Division of Infectious Diseases  [phone]  After 5pm on weekdays and all day on weekends - please call [phone]  
Patient is a 82 year old female with PMH HTN, HLD, hyperthyroidism (untreated), recent hospitalization 11/22-11/26 for dysphagia and weakness found to have multinodular goiter presents with generalized weakness, worsening lethargy and poor PO intake with an episode of diarrhea at home.   In the ER, patient febrile 101.4F, hypoxic and placed on HFNC.   Was briefly on levophed for septic shock for persistent hypotension, wean off after 2L IVF.   CTAP with colitis of descending and sigmoid colon - ischemic vs infectious vs inflammatory bowel disease; bibasilar atelectasis vs pneumonia   +COVID PCR in ER -- pt COVID vaccinated x3; sx onset unclear  S/p cefepime, vancomycin    Sepsis, AHRF due to COVID-19 pneumonia  Colitis - possible ischemic vs infectious vs IBD  - afebrile now, on HFNC, BP improved, WBC stable   - CXR with chronic lungs changes, no infiltrates  - continue remdesivir x5d course - end tomorrow 12/8  - continue on dexamethasone x10d course  - GI following, no plan for colonoscopy at this time   - surgical eval for c/f ischemic bowel, no intervention, serial abd exams  - continue cefepime and add metronidazole for possible infectious colitis     -- will plan to complete total 5d course - end tomorrow 12/8  - if any diarrhea - send culture, GI stool PCR, C.diff (none so far noted)  - isolation per infection control protocol  - wean supplemental O2 as tolerated   - monitor clinically, temps, CBC, inflammatory markers       Maxx Quinones M.D.  [facility], Division of Infectious Diseases  [phone]  After 5pm on weekdays and all day on weekends - please call [phone]
Patient is a 82 year old female with PMH HTN, HLD, hyperthyroidism (untreated), recent hospitalization 11/22-11/26 for dysphagia and weakness found to have multinodular goiter presents with generalized weakness, worsening lethargy and poor PO intake with an episode of diarrhea at home.   In the ER, patient febrile 101.4F, hypoxic and placed on HFNC.   Was briefly on levophed for septic shock for persistent hypotension, wean off after 2L IVF.   CTAP with colitis of descending and sigmoid colon - ischemic vs infectious vs inflammatory bowel disease; bibasilar atelectasis vs pneumonia   +COVID PCR in ER -- pt COVID vaccinated x3; sx onset unclear  S/p cefepime, vancomycin    Sepsis, AHRF due to COVID-19 pneumonia  Colitis - possible ischemic vs infectious vs IBD  - afebrile now, on HFNC, BP improved, WBC stable   - CXR with chronic lungs changes, no infiltrates  - continue remdesivir x3d course - end 12/6  - continue on dexamethasone x10d course  - GI following, no plan for colonoscopy at this time   - surgical eval for c/f ischemic bowel, no intervention, serial abd exams  - continue cefepime and add metronidazole for possible infectious colitis   - if any diarrhea - send culture, GI stool PCR, C.diff (none so far noted)  - isolation per infection control protocol  - wean supplemental O2 as tolerated   - monitor clinically, temps, CBC, inflammatory markers       D/w Dr. Enrico Quinones M.D.  [facility], Division of Infectious Diseases  [phone]  After 5pm on weekdays and all day on weekends - please call [phone]
82F with PMH HTN, HLD, hyperthyroidism (untreated), recent hospitalization 11/22-11/26 for dysphagia and weakness found to have multinodular goiter presents with generalized weakness likely 2/2 poor PO intake found to be septic and s/p short course of pressors for likely hypovolemic shock.      Covid pneumonia:   COPD  HTN :  HLD:   HYPERTHYROIDISM ;  Multinodular goiter  Thrombocytopenia:   HIgh  D DImer:       12/08:    Covid pneumonia: on remdesivir  and dexa: not acidotic : lts are dropping:  cont to monitor: on empiric antibiotics : on 60% fio2: d dimer is pretty high : repeat is pending:  d dimer was 4 days ago: Hard to AC her with decreasing platelets   COPD: start Symbicort and ventolin:  emphysema demonstrated on ct chest : not acidotic  HTN : controlled  HYPERTHYROIDISM ; per primary team : endo saw her before:   Multinodular goiter : per primary team  Thrombocytopenia: ? reason : send LAYTON:    DVT Prophylaxis:    DW ACP  : AND pmd      12/09:\  Covid pneumonia: off remdesivir  and on dexa: not acidotic : lts are dropping:  cont to monitor: off empiric antibiotics : on 60% fio2: d dimer is pretty high : repeat is pending:  d dimer is lsightly elevated: : Hard to AC her with decreasing platelets butno pe   COPD: start Symbicort and ventolin:  emphysema demonstrated on ct chest : not acidotic  HTN : controlled  HYPERTHYROIDISM ; per primary team : endo saw her before:   Multinodular goiter : per primary team  Thrombocytopenia: ? reason : send LAYTON:: defer to hemonc    DVT Prophylaxis:    DW ACP  :   12/11:    Covid pneumonia: off remdesivir  and on dexa: not acidotic : plts are recovering now:   cont to monitor: off empiric antibiotics : on 40% fio2: d dimer is pretty high : d dimer is slightly elevated: :but overall she is getting better:  check d dimer in AM   COPD: start Symbicort and ventolin:  emphysema demonstrated on ct chest : not acidotic  HTN : controlled  HYPERTHYROIDISM ; per primary team : jami saw her before:   Multinodular goiter : per primary team  Thrombocytopenia: recovering : defer to primary team    DVT Prophylaxis:    DW ACP  :       12/12:    Covid pneumonia: off remdesivir  and on dexa: not acidotic : plts are recovering now:   cont to monitor: off empiric antibiotics : on 40% fio2: d dimer is pretty high : d dimer is slightly elevated: :but overall she is getting better: 12/8 : ct qwith no pe and has ateelctasis  COPD: started Symbicort and ventolin:  emphysema demonstrated on ct chest : not acidotic  HTN : controlled  HYPERTHYROIDISM ; per primary team : jami saw her before:   Multinodular goiter : per primary team  Thrombocytopenia: recovering : defer to primary team    DVT Prophylaxis:    DW ACP  :     12/13:  Covid pneumonia: off remdesivir  and finished  dexa: not acidotic : plts are down today    cont to monitor: off empiric antibiotics : on 100% fio2: :but she is doing poorly today r: 12/8 : ct with no pe and has atelectasis and severe emphysema   COPD: started Symbicort and ventolin:  emphysema demonstrated on ct chest : not acidotic: start solumedrol : she already received 10 days of dexa:  now added solumedrol :   HTN : controlled  HYPERTHYROIDISM ; per primary team : jami saw her before:   Multinodular goiter : per primary team  Thrombocytopenia: recovering : defer to primary team    DVT Prophylaxis:    DW ACP  :     
82F with PMH HTN, HLD, hyperthyroidism (untreated), recent hospitalization 11/22-11/26 for dysphagia and weakness found to have multinodular goiter presents with generalized weakness likely 2/2 poor PO intake found to be septic and s/p short course of pressors for likely hypovolemic shock.      Covid pneumonia:   COPD  HTN :  HLD:   HYPERTHYROIDISM ;  Multinodular goiter  Thrombocytopenia:   HIgh  D DImer:       12/08:    Covid pneumonia: on remdesivir  and dexa: not acidotic : lts are dropping:  cont to monitor: on empiric antibiotics : on 60% fio2: d dimer is pretty high : repeat is pending:  d dimer was 4 days ago: Hard to AC her with decreasing platelets   COPD: start Symbicort and ventolin:  emphysema demonstrated on ct chest : not acidotic  HTN : controlled  HYPERTHYROIDISM ; per primary team : endo saw her before:   Multinodular goiter : per primary team  Thrombocytopenia: ? reason : send LAYTON:    DVT Prophylaxis:    DW ACP  : AND pmd      12/09:\  Covid pneumonia: off remdesivir  and on dexa: not acidotic : lts are dropping:  cont to monitor: off empiric antibiotics : on 60% fio2: d dimer is pretty high : repeat is pending:  d dimer is lsightly elevated: : Hard to AC her with decreasing platelets butno pe   COPD: start Symbicort and ventolin:  emphysema demonstrated on ct chest : not acidotic  HTN : controlled  HYPERTHYROIDISM ; per primary team : endo saw her before:   Multinodular goiter : per primary team  Thrombocytopenia: ? reason : send LAYTON:: defer to hemonc    DVT Prophylaxis:    DW ACP  :       
82F with PMH HTN, HLD, hyperthyroidism (untreated), recent hospitalization 11/22-11/26 for dysphagia and weakness found to have multinodular goiter presents with generalized weakness likely 2/2 poor PO intake found to be septic and s/p short course of pressors for likely hypovolemic shock.      Covid pneumonia:   COPD  HTN :  HLD:   HYPERTHYROIDISM ;  Multinodular goiter  Thrombocytopenia:   HIgh  D DImer:       12/08:    Covid pneumonia: on remdesivir  and dexa: not acidotic : lts are dropping:  cont to monitor: on empiric antibiotics : on 60% fio2: d dimer is pretty high : repeat is pending:  d dimer was 4 days ago: Hard to AC her with decreasing platelets   COPD: start Symbicort and ventolin:  emphysema demonstrated on ct chest : not acidotic  HTN : controlled  HYPERTHYROIDISM ; per primary team : endo saw her before:   Multinodular goiter : per primary team  Thrombocytopenia: ? reason : send LAYTON:    DVT Prophylaxis:    DW ACP  : AND pmd      12/09:\  Covid pneumonia: off remdesivir  and on dexa: not acidotic : lts are dropping:  cont to monitor: off empiric antibiotics : on 60% fio2: d dimer is pretty high : repeat is pending:  d dimer is lsightly elevated: : Hard to AC her with decreasing platelets butno pe   COPD: start Symbicort and ventolin:  emphysema demonstrated on ct chest : not acidotic  HTN : controlled  HYPERTHYROIDISM ; per primary team : endo saw her before:   Multinodular goiter : per primary team  Thrombocytopenia: ? reason : send LAYTON:: defer to hemonc    DVT Prophylaxis:    DW ACP  :   12/11:    Covid pneumonia: off remdesivir  and on dexa: not acidotic : plts are recovering now:   cont to monitor: off empiric antibiotics : on 40% fio2: d dimer is pretty high : d dimer is slightly elevated: :but overall she is getting better:  check d dimer in AM   COPD: start Symbicort and ventolin:  emphysema demonstrated on ct chest : not acidotic  HTN : controlled  HYPERTHYROIDISM ; per primary team : endo saw her before:   Multinodular goiter : per primary team  Thrombocytopenia: recovering : defer to primary team    DVT Prophylaxis:    DW ACP  :       
82F with PMH HTN, HLD, hyperthyroidism (untreated), recent hospitalization 11/22-11/26 for dysphagia and weakness found to have multinodular goiter presents with generalized weakness likely 2/2 poor PO intake found to be septic and s/p short course of pressors for likely hypovolemic shock.      Covid pneumonia:   COPD  HTN :  HLD:   HYPERTHYROIDISM ;  Multinodular goiter  Thrombocytopenia:   HIgh  D DImer:       12/08:    Covid pneumonia: on remdesivir  and dexa: not acidotic : lts are dropping:  cont to monitor: on empiric antibiotics : on 60% fio2: d dimer is pretty high : repeat is pending:  d dimer was 4 days ago: Hard to AC her with decreasing platelets   COPD: start Symbicort and ventolin:  emphysema demonstrated on ct chest : not acidotic  HTN : controlled  HYPERTHYROIDISM ; per primary team : endo saw her before:   Multinodular goiter : per primary team  Thrombocytopenia: ? reason : send LAYTON:    DVT Prophylaxis:    DW ACP  : AND pmd      12/09:\  Covid pneumonia: off remdesivir  and on dexa: not acidotic : lts are dropping:  cont to monitor: off empiric antibiotics : on 60% fio2: d dimer is pretty high : repeat is pending:  d dimer is lsightly elevated: : Hard to AC her with decreasing platelets butno pe   COPD: start Symbicort and ventolin:  emphysema demonstrated on ct chest : not acidotic  HTN : controlled  HYPERTHYROIDISM ; per primary team : endo saw her before:   Multinodular goiter : per primary team  Thrombocytopenia: ? reason : send LAYTON:: defer to hemonc    DVT Prophylaxis:    DW ACP  :   12/11:    Covid pneumonia: off remdesivir  and on dexa: not acidotic : plts are recovering now:   cont to monitor: off empiric antibiotics : on 40% fio2: d dimer is pretty high : d dimer is slightly elevated: :but overall she is getting better:  check d dimer in AM   COPD: start Symbicort and ventolin:  emphysema demonstrated on ct chest : not acidotic  HTN : controlled  HYPERTHYROIDISM ; per primary team : jami saw her before:   Multinodular goiter : per primary team  Thrombocytopenia: recovering : defer to primary team    DVT Prophylaxis:    DW ACP  :       12/12:    Covid pneumonia: off remdesivir  and on dexa: not acidotic : plts are recovering now:   cont to monitor: off empiric antibiotics : on 40% fio2: d dimer is pretty high : d dimer is slightly elevated: :but overall she is getting better: 12/8 : ct qwith no pe and has ateelctasis  COPD: started Symbicort and ventolin:  emphysema demonstrated on ct chest : not acidotic  HTN : controlled  HYPERTHYROIDISM ; per primary team : endo saw her before:   Multinodular goiter : per primary team  Thrombocytopenia: recovering : defer to primary team    DVT Prophylaxis:    MARJORIE ACP  :     
82F with PMH HTN, HLD, hyperthyroidism (untreated), severe protein-calorie malnutrition, recent hospitalization 11/22-11/26 for dysphagia and weakness found to have multinodular goiter presents with generalized weakness likely 2/2 poor PO intake found to be septic and s/p short course of pressors for likely hypovolemic shock.
82 F with PMH HTN, HLD, hyperthyroidism (untreated), severe protein-calorie malnutrition, recent hospitalization 11/22-11/26 for dysphagia and weakness found to have multinodular goiter presents with generalized weakness likely 2/2 poor PO intake found to be septic and s/p short course of pressors for likely hypovolemic shock.
82F with PMH HTN, HLD, hyperthyroidism (untreated), recent hospitalization 11/22-11/26 for dysphagia and weakness found to have multinodular goiter presents with generalized weakness likely 2/2 poor PO intake found to be septic and s/p short course of pressors for likely hypovolemic shock.
82 F with PMH HTN, HLD, hyperthyroidism (untreated), severe protein-calorie malnutrition, recent hospitalization 11/22-11/26 for dysphagia and weakness found to have multinodular goiter presents with generalized weakness likely 2/2 poor PO intake found to be septic and s/p short course of pressors for likely hypovolemic shock.

## 2022-12-13 NOTE — PROGRESS NOTE ADULT - NUTRITIONAL ASSESSMENT
This patient has been assessed with a concern for Malnutrition and has been determined to have a diagnosis/diagnoses of Severe protein-calorie malnutrition and Underweight (BMI < 19).    This patient is being managed with:   Diet NPO-  Except Medications  Entered: Dec  4 2022  5:26AM    
This patient has been assessed with a concern for Malnutrition and has been determined to have a diagnosis/diagnoses of Severe protein-calorie malnutrition and Underweight (BMI < 19).    This patient is being managed with:   Diet Pureed-  Moderately Thick Liquids (MODTHICKLIQS)  Entered: Dec 10 2022  5:35PM    
This patient has been assessed with a concern for Malnutrition and has been determined to have a diagnosis/diagnoses of Severe protein-calorie malnutrition and Underweight (BMI < 19).    This patient is being managed with:   Diet NPO-  Except Medications  With Ice Chips/Sips of Water  Entered: Dec 11 2022 12:58PM    
This patient has been assessed with a concern for Malnutrition and has been determined to have a diagnosis/diagnoses of Severe protein-calorie malnutrition and Underweight (BMI < 19).    This patient is being managed with:   Diet NPO-  Except Medications  Entered: Dec  4 2022  5:26AM    
This patient has been assessed with a concern for Malnutrition and has been determined to have a diagnosis/diagnoses of Severe protein-calorie malnutrition and Underweight (BMI < 19).    This patient is being managed with:   Diet NPO-  Except Medications  Entered: Dec  4 2022  5:26AM    
This patient has been assessed with a concern for Malnutrition and has been determined to have a diagnosis/diagnoses of Severe protein-calorie malnutrition and Underweight (BMI < 19).    This patient is being managed with:   Diet NPO-  Except Medications  With Ice Chips/Sips of Water  Entered: Dec 11 2022 12:58PM    
This patient has been assessed with a concern for Malnutrition and has been determined to have a diagnosis/diagnoses of Severe protein-calorie malnutrition and Underweight (BMI < 19).    This patient is being managed with:   Diet NPO-  Except Medications  Entered: Dec  4 2022  5:26AM    
This patient has been assessed with a concern for Malnutrition and has been determined to have a diagnosis/diagnoses of Severe protein-calorie malnutrition and Underweight (BMI < 19).    This patient is being managed with:   Diet NPO-  Except Medications  With Ice Chips/Sips of Water  Entered: Dec 11 2022 12:58PM

## 2022-12-13 NOTE — PROGRESS NOTE ADULT - PROBLEM SELECTOR PLAN 2
COVID positive 12/3  - on HF 50/50, wean as tolerated  - decadron (12/4-12/14)  - remdesivir (12/4-12/8)  - appreciate ID  - wean hi-flow O2 as tolerates.  - CTa chest neg for PE, but noted atelectasis. Incentive spirometry ordered  - possibly COPD, nebs and inhaler per pulm
COVID positive 12/3  - on HF 50/50, wean as tolerated  - decadron (12/4-12/14)  - remdesivir (12/4-12/8)  - appreciate ID  - wean hi-flow O2 as tolerates.  - CTa chest neg for PE, but noted atelectasis. Incentive spirometry ordered  - possibly COPD, nebs and inhaler per pulm
COVID positive 12/3  - on HF 50/50, wean as tolerated  - decadron (12/4-12/14)  - remdesivir (12/4-12/8)  - appreciate ID
COVID positive 12/3  - on HF 50/50, wean as tolerated  - decadron (12/4-12/14)  - remdesivir (12/4-12/8)  - appreciate ID  - wean hi-flow O2 as tolerates.  - CTa chest neg for PE, but noted atelectasis. Incentive spirometry ordered  - possibly COPD, nebs and inhaler per pulm
COVID positive 12/3  - on HF 50/50, wean as tolerated  - decadron (12/4-12/14)  - remdesivir (12/4-12/8)  - appreciate ID
COVID positive 12/3  - on HF 50/50, wean as tolerated  - decadron (12/4-12/14)  - remdesivir (12/4-12/8)  - appreciate ID
COVID positive 12/3  - on HF 50/50, wean as tolerated  - decadron (12/4-12/14)  - remdesivir (12/4-12/8)  - appreciate ID  - wean hi-flow O2 as tolerates.  - CTa chest neg for PE, but noted atelectasis. Incentive spirometry ordered  - possibly COPD, nebs and inhaler per pulm
COVID positive 12/3  - on HF 50/50, wean as tolerated  - decadron (12/4-12/14)  - remdesivir (12/4-12/8)  - appreciate ID  - wean hi-flow O2 as tolerates.  - CTa chest neg for PE, but noted atelectasis. Incentive spirometry ordered  - possibly COPD, nebs and inhaler per pulm
COVID positive 12/3  - on HF 50/50, wean as tolerated  - decadron (12/4-12/14)  - remdesivir (12/4-12/8)  - appreciate ID

## 2022-12-13 NOTE — BH CONSULTATION LIAISON PROGRESS NOTE - NSBHCHARTREVIEWLAB_PSY_A_CORE FT
9.4    9.29  )-----------( 78       ( 13 Dec 2022 06:55 )             31.0   12-13    152<H>  |  112<H>  |  41<H>  ----------------------------<  247<H>  4.6   |  18<L>  |  0.59    Ca    7.5<L>      13 Dec 2022 06:55  Phos  4.2     12-13  Mg     2.00     12-13    TPro  4.2<L>  /  Alb  2.0<L>  /  TBili  0.6  /  DBili  <0.2  /  AST  36<H>  /  ALT  35<H>  /  AlkPhos  127<H>  12-13

## 2022-12-13 NOTE — PROGRESS NOTE ADULT - PROBLEM/PLAN-7
[facility] - Emergency Dept  Hospital Medicine  History & Physical    Patient Name: Aleyda Floyd  MRN: [id number]  Patient Class: IP- Inpatient  Admission Date: 6/27/2022  Attending Physician: Sosa Morrow MD   Primary Care Provider: Elvie Fernandes MD         Patient information was obtained from patient, relative(s), past medical records and ER records.     Subjective:     Principal Problem:Anasarca    Chief Complaint:   Chief Complaint   Patient presents with    Leg Swelling     States swelling to legs and arms, Liver cirrhosis patient, had EGD on 6/23. Pt states normally approx 170 pounds but now 212. Denies cp and sob        HPI: This is a 69 year old deaf lady with alcoholic cirrhosis, and hypertension who presented with generalized swelling.  used to obtain history. Patient states that she had the swelling since her diagnosis of cirrhosis in February. Patient states that 2 days ago, she was able to ambulate but with some difficulty due to the swelling but now  worsening to the point where she is unable to ambulate by herself. Of note, patient had a recent EGD performed earlier last week where they had issues with her IV resulting is significant bruising. Patient also notes that whenever she scratches her skin with her nails, she notices clear fluid coming from the cuts. Patient denies fever, chills, cough, hemoptysis, nausea, vomiting, abdominal pain, yellowing of skin, constipation, dysuria, hematuria, and black/ bloody stools. Patient has baseline loose stools from her lactulose.  She reports compliance with all medications including her lactulose and Lasix. Patient and family declined any confusion or altered mental status. She denies any recent alcohol use with last use in February when she was diagnosed with alcoholic cirrhosis. Patient states that she used to weigh around 170s which she believes is her dry weight and is currently in the 200s.     Upon chart review, patient had an echo 
at an OSH on 2/25/22 which showed EF 60-65% and some diastolic dysfunction.    In the ED, patient was found to have Hgb of 8.5 and platelets of 113 around baseline. Patient had a UA which showed 1+ leuks and many bacteria. Patient did not complain of dysuria.       Past Medical History:   Diagnosis Date    Hypercholesterolemia     Hypertension        Past Surgical History:   Procedure Laterality Date    DILATION AND CURETTAGE OF UTERUS      ESOPHAGOGASTRODUODENOSCOPY N/A 6/23/2022    Procedure: EGD (ESOPHAGOGASTRODUODENOSCOPY);  Surgeon: Sean Perry MD;  Location: [address]);  Service: Endoscopy;  Laterality: N/A;  cirrhosis, variceal screening  labs prior  -request sent 6/14  fully vaccinated, instructions emailed to [email or web address]       Review of patient's allergies indicates:  No Known Allergies    No current facility-administered medications on file prior to encounter.     Current Outpatient Medications on File Prior to Encounter   Medication Sig    furosemide (LASIX) 20 MG tablet Take 20 mg by mouth 2 (two) times daily.    lactulose (CHRONULAC) 10 gram/15 mL solution Take 20 g by mouth.    aspirin 81 MG Chew Take 81 mg by mouth once daily.    cholecalciferol, vitamin D3, (VITAMIN D3) 50 mcg (2,000 unit) Cap capsule Take 1 capsule by mouth once daily.    cholestyramine-aspartame (QUESTRAN LIGHT) 4 gram PwPk Take 4 g by mouth.    folic acid (FOLVITE) 1 MG tablet Take 1 mg by mouth once daily.     hydrochlorothiazide (HYDRODIURIL) 25 MG tablet Take 12.5 mg by mouth once daily.     melatonin (MELATIN) 5 mg Take 5 mg by mouth nightly.    minocycline (MINOCIN,DYNACIN) 100 MG capsule Take 100 mg by mouth 2 (two) times daily.    nystatin (MYCOSTATIN) powder Please apply to skin folds beneath breasts.    omeprazole (PRILOSEC) 40 MG capsule Take 40 mg by mouth once daily.    thiamine (VITAMIN B-1) 100 MG tablet Take 100 mg by mouth once daily.    
wound dressings Pste Apply 35 application topically.    [DISCONTINUED] losartan (COZAAR) 50 MG tablet Take 50 mg by mouth once daily.     [DISCONTINUED] oxycodone-acetaminophen 5-325 mg (PERCOCET) 5-325 mg per tablet Take 1 tablet by mouth every 4 (four) hours as needed. (Patient not taking: No sig reported)    [DISCONTINUED] simvastatin (ZOCOR) 10 MG tablet Take 10 mg by mouth once daily.      Family History       Problem Relation (Age of Onset)    Breast cancer Maternal Grandmother          Tobacco Use    Smoking status: Former Smoker     Packs/day: 1.00     Years: 15.00     Pack years: 15.00     Quit date: 1998     Years since quittin.5    Smokeless tobacco: Never Used   Substance and Sexual Activity    Alcohol use: Not Currently    Drug use: Not Currently    Sexual activity: Not on file     Review of Systems   Constitutional:  Negative for chills, fatigue and fever.   HENT:  Negative for ear pain and sinus pain.    Eyes:  Negative for photophobia, pain and visual disturbance.   Respiratory:  Negative for cough and shortness of breath.    Cardiovascular:  Negative for chest pain and leg swelling.   Gastrointestinal:  Positive for abdominal distention. Negative for abdominal pain, blood in stool, constipation, diarrhea, nausea and vomiting.   Endocrine: Negative for polyuria.   Genitourinary:  Negative for difficulty urinating, dysuria, flank pain, pelvic pain and vaginal pain.   Musculoskeletal:  Negative for back pain and neck pain.        Swelling in all extremities   Skin:  Negative for color change and rash.   Neurological:  Positive for weakness. Negative for dizziness, light-headedness, numbness and headaches.   Psychiatric/Behavioral:  Negative for confusion and sleep disturbance.    Objective:     Vital Signs (Most Recent):  Temp: 97.4 °F (36.3 °C) (22 0300)  Pulse: 90 (22)  Resp: 14 (22)  BP: (!) 145/86 (22)  SpO2: 99 % (22)   Vital 
DISPLAY PLAN FREE TEXT
Signs (24h Range):  Temp:  [97.4 °F (36.3 °C)-98 °F (36.7 °C)] 97.4 °F (36.3 °C)  Pulse:  [88-93] 90  Resp:  [14-20] 14  SpO2:  [98 %-100 %] 99 %  BP: (145-209)/(74-86) 145/86     Weight: 96.2 kg (212 lb)  Body mass index is 38.78 kg/m².    Physical Exam  Constitutional:       General: She is not in acute distress.     Appearance: She is obese. She is not ill-appearing or diaphoretic.   HENT:      Head: Normocephalic and atraumatic.      Right Ear: External ear normal.      Left Ear: External ear normal.      Nose: Nose normal. No congestion or rhinorrhea.      Mouth/Throat:      Mouth: Mucous membranes are moist.      Pharynx: Oropharynx is clear. No oropharyngeal exudate or posterior oropharyngeal erythema.   Eyes:      General: No scleral icterus.     Extraocular Movements: Extraocular movements intact.      Conjunctiva/sclera: Conjunctivae normal.   Neck:      Vascular: No carotid bruit.   Cardiovascular:      Rate and Rhythm: Normal rate and regular rhythm.      Pulses: Normal pulses.      Heart sounds: Normal heart sounds. No murmur heard.    No friction rub.   Pulmonary:      Effort: Pulmonary effort is normal. No respiratory distress.      Breath sounds: Normal breath sounds. No stridor. No wheezing.   Chest:      Chest wall: No tenderness.   Abdominal:      General: Bowel sounds are normal. There is distension.      Palpations: There is no mass.      Tenderness: There is no right CVA tenderness, left CVA tenderness or guarding.      Comments: Distended with positive fluid wave. No tenderness to palpation   Musculoskeletal:         General: Swelling present. No tenderness or deformity. Normal range of motion.      Cervical back: Normal range of motion. No rigidity or tenderness.      Right lower leg: Edema present.      Left lower leg: Edema present.      Comments: Diffuse edema with some weeping noted in right upper extremity   Skin:     General: Skin is warm and dry.      Capillary Refill: Capillary refill 
DISPLAY PLAN FREE TEXT
takes less than 2 seconds.      Coloration: Skin is not jaundiced or pale.      Findings: Bruising present. No erythema.      Comments: Bruising noted on bilateral forearms   Neurological:      General: No focal deficit present.      Mental Status: She is alert and oriented to person, place, and time. Mental status is at baseline.      Motor: No weakness.      Coordination: Coordination normal.   Psychiatric:         Mood and Affect: Mood normal.         Behavior: Behavior normal.         Thought Content: Thought content normal.           Significant Labs: All pertinent labs within the past 24 hours have been reviewed.  CBC:   Recent Labs   Lab 06/28/22 0042   WBC 7.06   HGB 8.5*   HCT 26.2*   *     CMP:   Recent Labs   Lab 06/28/22 0042   *   K 4.3      CO2 19*   *   BUN 9   CREATININE 1.0   CALCIUM 8.6*   PROT 6.9   ALBUMIN 1.9*   BILITOT 1.8*   ALKPHOS 91   AST 45*   ALT 25   ANIONGAP 5*   EGFRNONAA 58.4*       Significant Imaging: I have reviewed all pertinent imaging results/findings within the past 24 hours.    Assessment/Plan:     * Anasarca  69 yoF with alcoholic liver cirrhosis and hypertension admitted for generalized anasarca. Patient's albumin on admission was 1.9. Likely edema related to poor intravascular oncotic pressure 2/2 alcoholic cirrhosis. Patient adherent to home medication. Given history and exam, patient would benefit from diuresis. Dry weight reported as 170s. Patient is currently 212 lbs.     - will diurese with lasix 40 IV BID and aldactone 100 mg qd  - strict I/Os  - daily weights  - low sodium diet with 1500 ml fluid restriction        Alcoholic cirrhosis of liver with ascites  Patient was diagnosed in February. Previous history of chronic alcohol abuse and states she drank about 4-5 glasses of wine per night for over 20 years. Patient is seeing hepatology outpatient. Labs shown in care everywhere.     MELD-Na score: 18 at 6/28/2022 12:42 AM  MELD score: 15 at 
6/28/2022 12:42 AM  Calculated from:  Serum Creatinine: 1.0 mg/dL at 6/28/2022 12:42 AM  Serum Sodium: 133 mmol/L at 6/28/2022 12:42 AM  Total Bilirubin: 1.8 mg/dL at 6/28/2022 12:42 AM  INR(ratio): 1.7 at 6/28/2022 12:42 AM  Age: 69 years    - consulted hepatology, appreciate recommendations  - continue lactulose, thiamine, and folate  - IR consulted for paracentesis  - continue to monitor for signs of liver failure  - f/u daily CMPs    Primary hypertension  Patient was discontinued from losartan per hepatologist.     - continue to monitor and can add when clinically indicated      Deaf- written communication  ASL  used for interaction      Thrombocytopenia  Chronic, patient at baseline      Anemia, chronic disease  Patient with Hgb of 8.5 on admit. Upon review of care everywhere, patient had Hgb of 8.4 in all of 2022. Likely due to chronic disease and stable.     - continue to monitor      VTE Risk Mitigation (From admission, onward)         Ordered     IP VTE HIGH RISK PATIENT  Once         06/28/22 0419     Place sequential compression device  Until discontinued         06/28/22 0419     Place sequential compression device  Until discontinued         06/28/22 0415                   Yaakov Castro MD  Department of Hospital Medicine   [facility] - Emergency Dept  
DISPLAY PLAN FREE TEXT

## 2022-12-13 NOTE — PROGRESS NOTE ADULT - PROBLEM SELECTOR PLAN 8
TSH 0.15, T4 535, T3 67  - multinodular goiter found on last admission   - endo on last admission rec'd to avoid contrast studies, had CT angio this admission  - endo c/s sent to [email or web address]  - no clinical signs of hyperthyroidism, no need for beta-blockade or methimazole for now

## 2022-12-13 NOTE — DISCHARGE NOTE FOR THE EXPIRED PATIENT - HOSPITAL COURSE
82 F with PMH HTN, HLD, hyperthyroidism (untreated), severe protein-calorie malnutrition, recent hospitalization 11/22-11/26 for dysphagia and weakness found to have multinodular goiter presents with generalized weakness likely 2/2 poor PO intake found to be septic and s/p short course of pressors for likely hypovolemic shock.    # Sepsis.   -  T 101.4, HR 120s.   - s/p vanc and cefepime in ED  - CTA abd with nonspecific enterocolitis with wall thickening of the descending and sigmoid segments of the colon and bilateral dependent pneumonia in the lower lobes  - Received vanco (12/3- ), cefepime (12/3- ), azithro (12/4- )  - cont on cefepime; metronidazole added 12/5/22, completed abx course 12/8/22  - Blood/urine cxs 12/3 (-); Legionella Ur Ag 12/4 (-)  - MRSA PCR 12/4 (+).  - COVID +    #COVID-19. - 12/3  - on HF 50/50, wean as tolerated  - decadron (12/4-12/14)  - remdesivir (12/4-12/8)  - appreciate ID  - CTA chest (-) for PE, but noted atelectasis. Incentive spirometry ordered  - possibly COPD, nebs and inhaler per pulm.    # Hypernatremia.   - Na 159->158; Na 141 one week ago, likely 2/2 to poor PO intake  - S/p 2L IVF in ED  - free water deficit 3.3L  - careful correction-  less than 12 mEq over first 24 hours  -t D5LR @ 65 cc/hr --> D5W  - encourage PO.    #Hypercalcemia.   - Corrected Ca 11.7-> 10.7  - likely 2/2 to poor PO intake  - S/p 2L IVF in ED  - 1L LR and start LR @ 75cc/hr  - encourage PO.    # JOCELYN (acute kidney injury).   - Baseline 0.04-0.6; Cr 1.35  - likely 2/2 to poor PO intake  - c/w IVF  - encourage PO  - trend Cr.    #Abnormal CT scan.   - CT angio abd: Nonspecific enterocolitis with wall thickening of the descending and sigmoid segments of the colon. An infectious etiology is favored. Bowel ischemia remains on the differential diagnosis. However, there is no evidence of mesenteric artery occlusion or mesenteric vein thrombosis. No pneumatosis or portal venous gas  - NPO for now; pt refusing dysphagia screen   - appreciate GI/surgey recs    #GI bleed  - Blood seen on thermometer used for rectal temperature per MICU note  - Hgb 14.1 on admission; s/p 1u pRBC 12/3   - dark brown stool seen on exam  - trend CBC and monitor for signs of bleeding.    #Elevated troponin.   -  Trop 87 -> 99 -> 69  - pt denies CP, non ischemic EKG likely demand.  - can consider TTE when stable.    #: Hyperthyroidism.   -TSH 0.15, T4 535, T3 67  - multinodular goiter found on last admission   - endo on last admission rec'd to avoid contrast studies, had CT angio this admission  - endo c/s sent to [email or web address]  - no clinical signs of hyperthyroidism, no need for beta-blockade or methimazole for now.    # HTN (hypertension).   -Home med: losartan 100mg   - hold home medications i/s/o sepsis and hypovolemia.  DNR/DNI.    Called to bedside to evaluate patient for decreased respirations and no heart sounds. Patient DNR/DNI.  Patient seen and evaluated at bedside. On physical exam, patient did not respond to verbal or noxious stimuli.  No spontaneous respirations.  Absent heart and breath sounds.  Absent radial and carotid pulses. Pupils are fixed and dilated, no corneal reflex. Patient pronounced dead at 20:37 on 12/13/22.  [facility] notified.  Daughter at bedside.

## 2022-12-13 NOTE — BH CONSULTATION LIAISON PROGRESS NOTE - NSBHCHARTREVIEWVS_PSY_A_CORE FT
Vital Signs Last 24 Hrs  T(C): 36.3 (13 Dec 2022 12:59), Max: 36.3 (12 Dec 2022 21:00)  T(F): 97.4 (13 Dec 2022 12:59), Max: 97.4 (12 Dec 2022 21:00)  HR: 102 (13 Dec 2022 12:59) (85 - 102)  BP: 118/63 (13 Dec 2022 12:59) (100/57 - 150/65)  BP(mean): --  RR: 18 (13 Dec 2022 12:59) (18 - 20)  SpO2: 97% (13 Dec 2022 12:59) (96% - 100%)    Parameters below as of 13 Dec 2022 12:59  Patient On (Oxygen Delivery Method): nasal cannula

## 2022-12-16 ENCOUNTER — APPOINTMENT (OUTPATIENT)
Dept: ENDOCRINOLOGY | Facility: CLINIC | Age: 82
End: 2022-12-16

## 2023-01-30 NOTE — PROGRESS NOTE ADULT - SUBJECTIVE AND OBJECTIVE BOX
Per Department SUBJECTIVE/ OVERNIGHT EVENTS:  grossly volume overloaded  IV Lasix ordered  on IVF for hypernatremia  tricky without PO intake.  psyc seeing her.  prognosis is poor  she is awake alert  continue to refuse any po intake  remain on hi-flow      --------------------------------------------------------------------------------------------  LABS:                        9.6    7.54  )-----------( 145      ( 12 Dec 2022 07:53 )             30.0     12-12    149<H>  |  113<H>  |  30<H>  ----------------------------<  186<H>  3.7   |  23  |  0.42<L>    Ca    7.7<L>      12 Dec 2022 06:55  Phos  2.2     12-12  Mg     1.90     12-12    TPro  4.5<L>  /  Alb  2.2<L>  /  TBili  0.5  /  DBili  <0.2  /  AST  26  /  ALT  36<H>  /  AlkPhos  130<H>  12-12      CAPILLARY BLOOD GLUCOSE      POCT Blood Glucose.: 191 mg/dL (12 Dec 2022 18:02)  POCT Blood Glucose.: 163 mg/dL (12 Dec 2022 12:17)  POCT Blood Glucose.: 198 mg/dL (12 Dec 2022 06:10)  POCT Blood Glucose.: 235 mg/dL (12 Dec 2022 02:26)  POCT Blood Glucose.: 241 mg/dL (12 Dec 2022 01:16)            RADIOLOGY & ADDITIONAL TESTS:    Imaging Personally Reviewed:  [x] YES  [ ] NO    Consultant(s) Notes Reviewed:  [x] YES  [ ] NO    MEDICATIONS  (STANDING):  budesonide 160 MICROgram(s)/formoterol 4.5 MICROgram(s) Inhaler 2 Puff(s) Inhalation two times a day  chlorhexidine 2% Cloths 1 Application(s) Topical daily  dexAMETHasone  Injectable 6 milliGRAM(s) IV Push daily  dextrose 5% + sodium chloride 0.45%. 1000 milliLiter(s) (50 mL/Hr) IV Continuous <Continuous>  dextrose 5%. 1000 milliLiter(s) (50 mL/Hr) IV Continuous <Continuous>  dextrose 5%. 1000 milliLiter(s) (100 mL/Hr) IV Continuous <Continuous>  dextrose 50% Injectable 25 Gram(s) IV Push once  dextrose 50% Injectable 12.5 Gram(s) IV Push once  dextrose 50% Injectable 25 Gram(s) IV Push once  enoxaparin Injectable 40 milliGRAM(s) SubCutaneous every 24 hours  furosemide   Injectable 20 milliGRAM(s) IV Push once  glucagon  Injectable 1 milliGRAM(s) IntraMuscular once  insulin lispro (ADMELOG) corrective regimen sliding scale   SubCutaneous every 6 hours  mupirocin 2% Ointment 1 Application(s) Topical two times a day  pantoprazole  Injectable 40 milliGRAM(s) IV Push daily    MEDICATIONS  (PRN):  albuterol    90 MICROgram(s) HFA Inhaler 2 Puff(s) Inhalation every 6 hours PRN Shortness of Breath and/or Wheezing  dextrose Oral Gel 15 Gram(s) Oral once PRN Blood Glucose LESS THAN 70 milliGRAM(s)/deciliter      Care Discussed with Consultants/Other Providers [x] YES  [ ] NO    Vital Signs Last 24 Hrs  T(C): 36.3 (12 Dec 2022 12:34), Max: 36.6 (11 Dec 2022 22:49)  T(F): 97.4 (12 Dec 2022 12:34), Max: 97.8 (11 Dec 2022 22:49)  HR: 88 (12 Dec 2022 20:30) (76 - 88)  BP: 149/82 (12 Dec 2022 12:34) (149/82 - 162/91)  BP(mean): --  RR: 18 (12 Dec 2022 20:30) (18 - 19)  SpO2: 97% (12 Dec 2022 20:30) (95% - 98%)    Parameters below as of 12 Dec 2022 20:30  Patient On (Oxygen Delivery Method): nasal cannula, high flow  O2 Flow (L/min): 30  O2 Concentration (%): 50  I&O's Summary    11 Dec 2022 07:01  -  12 Dec 2022 07:00  --------------------------------------------------------  IN: 600 mL / OUT: 500 mL / NET: 100 mL              PHYSICAL EXAM:  GENERAL: NAD, ill-appearing, thin, cachetic, on hi-flow O2  HEAD:  Atraumatic, normocephalic  EYES: EOMI, PERRLA, conjunctiva and sclera clear  NECK: Supple, no JVD, no carotid bruits  HEART: Regular rate and rhythm, no murmurs, rubs, or gallops  CHEST/LUNG: mild decrease breath sounds bilaterally; No wheeze   ABDOMEN: Soft, nondistended, epigastric tenderness, no hepatosplenomegaly  EXTREMITIES: 2+ peripheral pulses bilaterally. No clubbing, cyanosis, or edema  NERVOUS SYSTEM:  A&Ox1-2, no focal posturing

## 2023-05-09 NOTE — PHYSICAL THERAPY INITIAL EVALUATION ADULT - BED MOBILITY LIMITATIONS, REHAB EVAL
decreased ability to use arms for pushing/pulling/decreased ability to use legs for bridging/pushing/impaired ability to control trunk for mobility
PAST MEDICAL HISTORY:  Hypertension     Non-Hodgkin lymphoma Treated with Chemotherapy and Radiation Therapy    Supraventricular Tachycardia     Ulcerative colitis

## 2024-01-20 NOTE — H&P ADULT - NSICDXFAMILYHX_GEN_ALL_CORE_FT
[facility] Internal Medicine Progress Note        Subjective:     Pt doing well, no issues or concerns, ready for discharge     Objective: RN report SW assisting with HH IV infusion for Primacor     SWAN out 1/19 and PICC placed     Medications  Current Facility-Administered Medications   Medication Dose Route Frequency Provider Last Rate Last Admin    warfarin (COUMADIN) tablet 4 mg  4 mg Oral Once Nikita Warren MD        milrinone (PRIMACOR) 20 mg/100 mL in dextrose 5 % infusion premix  0.125 mcg/kg/min (Dosing Weight) Intravenous Continuous Ana Laura Gusman NP 3.4 mL/hr at 01/20/24 1000 0.125 mcg/kg/min at 01/20/24 1000    sodium chloride 0.9 % flush bag 25 mL  25 mL Intravenous PRN Virginia Rea CNP        Potassium Replacement (Levels 3.6 - 4)   Does not apply See Admin Instructions Virginia Rea CNP        WARFARIN - PHARMACIST MONITORED Misc   Does not apply See Admin Instructions Nikita Warren MD        sacubitril-valsartan (ENTRESTO) 49-51 MG per tablet 1 tablet  1 tablet Oral BID Nikita Warren MD   1 tablet at 01/20/24 0924    carvedilol (COREG) tablet 12.5 mg  12.5 mg Oral 2 times per day Nikita Warren MD   12.5 mg at 01/20/24 0924    sodium chloride 0.9 % flush bag 25 mL  25 mL Intravenous PRN Jeff Gonzalez MD        sodium chloride 0.9 % injection 2 mL  2 mL Intracatheter 2 times per day Jeff Gonzalez MD   2 mL at 01/20/24 0938    dextrose 5 % infusion   Intravenous Continuous Nikita Warren MD 1 mL/hr at 01/18/24 1900 Rate Verify at 01/18/24 1900    sodium chloride 0.9% infusion   Intravenous Continuous Nikita Warren MD 10 mL/hr at 01/18/24 1900 Rate Verify at 01/18/24 1900    sodium chloride 0.9% infusion   Intravenous Continuous Nikita Warren MD 10 mL/hr at 01/18/24 1500 Rate Verify at 01/18/24 1500    sodium chloride 0.9% infusion   Intravenous Continuous Nikita Warren MD 6 mL/hr at 01/18/24 1900 Rate Verify at 01/18/24 1900    heparin (porcine) 25,000 units/250  mL in dextrose 5 % infusion  1-30 Units/kg/hr (Dosing Weight) Intravenous Continuous Braxton Bryant MD 11.9 mL/hr at 01/20/24 1000 13 Units/kg/hr at 01/20/24 1000    sodium chloride 0.9 % flush bag 25 mL  25 mL Intravenous PRN Amadeo Fair CNP        Potassium Standard Replacement Protocol (Levels 3.5 and lower)   Does not apply See Admin Instructions Amadeo Fair CNP        ALPRAZolam (XANAX) tablet 0.25 mg  0.25 mg Oral Q8H PRN Terence Lozoya MD   0.25 mg at 01/14/24 2310    AMIODarone (PACERONE) tablet 200 mg  200 mg Oral Daily Terence Lozoya MD   200 mg at 01/20/24 0924    aspirin chewable 81 mg  81 mg Oral Daily Terence Lozoya MD   81 mg at 01/20/24 0924    escitalopram (LEXAPRO) tablet 10 mg  10 mg Oral Daily Terence Lozoya MD   10 mg at 01/20/24 0924    famotidine (PEPCID) tablet 20 mg  20 mg Oral Daily Terence Lozoya MD   20 mg at 01/20/24 0924    spironolactone (ALDACTONE) tablet 25 mg  25 mg Oral Daily Terence Lozoya MD   25 mg at 01/20/24 0924    acetaminophen (TYLENOL) tablet 650 mg  650 mg Oral Q4H PRN Terence Lozoya MD        empagliflozin (JARDIANCE) tablet 10 mg  10 mg Oral Daily Ana Laura Gusman, NP   10 mg at 01/20/24 0924    torsemide (DEMADEX) tablet 20 mg  20 mg Oral Daily Ana Laura Gusman, NP   20 mg at 01/20/24 0924          Vitals:     Vitals with min/max:      Vital Last Value 24 Hour Range   Temperature 97.5 °F (36.4 °C) (01/20/24 0800) Temp  Min: 97.2 °F (36.2 °C)  Max: 99 °F (37.2 °C)   Pulse (!) 60 (01/20/24 1000) Pulse  Min: 60  Max: 91   Respiratory 16 (01/20/24 1000) Resp  Min: 12  Max: 27   Non-Invasive  Blood Pressure 102/42 (01/20/24 1000) BP  Min: 96/36  Max: 136/60   Pulse Oximetry 97 % (01/20/24 1000) SpO2  Min: 96 %  Max: 99 %   Arterial   Blood Pressure   No data recorded           Physical Exam    Cardiovascular:      Rate and Rhythm: Normal rate.      Comments: paced  Pulmonary:      Effort: Pulmonary effort is normal.      Breath sounds: Normal breath  sounds.   Right upper chest wall tunnel PICC  Abdominal:      General: Abdomen is flat.      Palpations: Abdomen is soft.   Skin:     General: Skin is warm and dry.   Neurological:      General: No focal deficit present.      Mental Status: He is alert and oriented to person, place, and time.         Imaging  IR TUNNELED CENTRAL LINE INSERTION AGE 7 OR OLDER    Result Date: 1/19/2024  PROCEDURE: TUNNELED CENTRAL VENOUS CATHETER PLACEMENT INTERVENTIONALIST: Elvin Higgins M.D. ASSISTANT(S): None CLINICAL HISTORY: PREPROCEDURE DIAGNOSIS: Requirement for long-term venous access. INDICATION: 28 years old Male with heart failure who require long term venous access for home inotropes POST PROCEDURE DIAGNOSIS: Same PROCEDURES PERFORMED: Ultrasound Guided Venous Access. Fluoroscopic Guided Tunneled Central Venous Access Placement. Conscious Sedation ANESTHESIA/SEDATION: Moderate conscious sedation was provided throughout the procedure by interventional radiology nursing personnel using Fentanyl and Versed administered intravenously.  The nurse (independent trained observer) assisted in continuously monitoring the patient's level of consciousness and physiologic status throughout the entire procedure.  I (attending physician) was present throughout the procedure and supervised/directed the sedation administered to the patient. Total intra-service time was 18 minutes. PROPHYLACTIC ANTIBIOTIC: None PREPARATION: The site was prepared and draped and all elements of maximal sterile barrier technique including sterile gloves, sterile gown, mask, large sterile sheet, hand hygiene and cutaneous antisepsis with 2% chlorhexidine +70% isopropyl alcohol were used. PROCEDURE/FINDINGS: ULTRASOUND GUIDED VENOUS ACCESS: Local anesthesia was administered. The vessel was sonographically evaluated and judged to be appropriate for access. Real-time ultrasound was used to visualize needle entry into the vessel and an image of the patent vein was  sent to the PACS for documentation.      Vessel accessed: Right internal jugular vein      Access Technique: 21-gauge micropuncture needle with micropuncture set. FLUOROSCOPY-GUIDED TUNNELED CATHETER PLACEMENT: A guidewire and catheter were then passed centrally using fluoroscopic guidance.   The intravascular length from the access site to the right atrium was assessed.  After local anesthesia was administered in the subclavicular region, a short transverse incision was made and the central venous catheter was tunneled to the internal jugular access site, and inserted through a peel-away sheath. A final fluoroscopic image was sent to the PACS for documentation.      Catheter type: Powerline polyurethane catheter with SureCuff ingrowth cuff      Catheter tip location: Right atrium      Catheter size: 6      Closure: The incisions were closed using and Dermabond. A sterile bandage was applied.      Additional description of procedure: None COMPLICATIONS: None SPECIMENS REMOVED: None ESTIMATED BLOOD LOSS: Minimal RADIATION/CONTRAST DOSE: FLUOROSCOPY TIME: 0.2 minutes. CUMULATIVE AIR KERMA: 2 mGy CONTRAST DOSE: 0 cc ___________________________________________________________________________ _____________________________________     Technically successful placement of right internal jugular dual lumen tunneled small bore catheter under ultrasound and fluoroscopic guidance, as described above. PLAN: The catheter is in good position and ready for immediate use. Electronically Signed by: SABRINA BRUCE M.D. Signed on: 1/19/2024 6:02 PM Workstation ID: [id number]      XR CHEST AP OR PA    Result Date: 1/16/2024  XR CHEST AP OR PA Exam date: 1/16/2024 9:29 AM CLINICAL HISTORY: . PA catheter placement evaluation  TECHNIQUE: AP view of the chest is obtained. COMPARISON: Chest radiograph from 1/16/2024 FINDINGS: Heart is enlarged. Median sternotomy. Prosthetic cardiac valve.. Left subclavian pacemaker. Right IJ [place]-Radha  catheter tip projects over the pulmonary outflow tract. Minimal perihilar opacities. No focal lobar consolidation. No pneumothorax. No significant pleural effusion.     1.   Right IJ [place]-Radha catheter tip projects over the pulmonary outflow tract. Minimal perihilar opacities. No focal lobar consolidation. Electronically Signed by: SIMRAN HUDSON MD Signed on: 1/16/2024 10:22 AM Workstation ID: [id number]    US VASC EXTREMITY UPPER VENOUS DUPLEX    Result Date: 1/16/2024  EXAM: US VASC EXTREMITY UPPER VENOUS DUPLEX BILATERAL CLINICAL HISTORY: 28 years Male OTHER  edema. COMPARISON: None available. TECHNIQUE: Upper extremity venous Doppler is performed using a linear high frequency transducer. FINDINGS: There is a normal compressibility of the Jugular, axillary, basilic, brachial, and cephalic  veins. Doppler flow analysis demonstrates normal venous flow. Visualized portions of the subclavian veins are widely patent.     No evidence of acute deep vein thrombosis. Electronically Signed by: SIMRAN HUDSON MD Signed on: 1/16/2024 7:26 AM Workstation ID: [id number]    XR CHEST AP OR PA    Result Date: 1/16/2024  History: PA catheter placement evaluation Exam: XR CHEST AP OR PA Comparison: Chest radiograph 1/11/2024 Findings: Single portable frontal view of the chest. Left-sided cardiac device with leads in unchanged position. Right PICC has been removed. Right IJ [place]-Radha catheter tip projects over the right pulmonary artery. Stable cardiomediastinal silhouette. Normal pulmonary vascularity. No focal consolidation. No pleural effusion or pneumothorax.     Impression: Right IJ [place]-Radha catheter tip projects over the right pulmonary artery. Electronically Signed by: ANNA STRATTON M.D. Signed on: 1/16/2024 6:22 AM Workstation ID: [id number]    Cath/PV Case    Addendum Date: 1/15/2024    RIGHT HEART CATHETERIZATION FINDINGS: on milrinone 0.375 mcg/kg/min 1. Right atrial pressures:    9 mmHg 2. Right ventricular  pressures:   38 / 9  mmHg 3. Pulmonary artery pressures:   45 / 20 / 28  mmHg 4. Pulmonary capillary wedge pressures:   18  mmHg 5. Transpulmonary Gradient:     10 6. Pulmonary artery saturations:     64.2 % 7. Aortic saturations:      96 % 8. Cardiac output and index calculated by Aixa method:  3.8 L/min, 2 L/min/m2 9. Cardiac output and index by thermodilution method:  4.0 L/min, 2.1 L/min/m2 10. Supplemental cardio-pulmonary support:  Inotrope:  Milrinone 0.375 mcg/kg/min intravenous 11. Heart rate:  59 bpm 12. Systemic blood pressure:  142 / 58 / 86  mmHg 13. Systemic vascular resistance: 1621 dynes * sec/cm5 14. Pulmonary vascular resistance: 2.6 Woods Units 15. Right ventricular function parameters:  Pulmonary artery pulsitility index:  2.8  RA/PCWP ratio:  0.5 SEDATION: Patient continuously monitored by trained personnel and supervised by me throughout sedation. Sedation Start: 1/15/2024 Time: 10:53 AM Sedation End: 1/15/2024 Time: 11:15 AM Total Number of Sedation Minutes: 22 Versed 1mg and Fentanyl 25mcg administered for moderate sedation CONCLUSIONS:  Mildly elevated left-sided filling pressures with borderline cardiac output and index on milrinone 0.375 mcg/kg/min  Mild post-capillary pulmonary hypertension  Preserved right ventricular function  RECOMMENDATIONS: Findings relayed to inpatient attending, Nikita Warren MD. [place]-Radha catheter sutured in place at 50cm. Patient transferred to CVTU for further management. Jeff Gonzalez MD Advanced Heart Failure, Mechanical Circulatory Support, Transplant Cardiology & Pulmonary Hypertension [facility]     Result Date: 1/15/2024  RIGHT HEART CATHETERIZATION FINDINGS: on milrinone 0.375 mcg/kg/min 1. Right atrial pressures:    9 mmHg 2. Right ventricular pressures:   38 / 9  mmHg 3. Pulmonary artery pressures:   45 / 20 / 28  mmHg 4. Pulmonary capillary wedge pressures:   18  mmHg 5. Transpulmonary Gradient:     10 6. Pulmonary artery  saturations:     64.2 % 7. Aortic saturations:      96 % 8. Cardiac output and index calculated by Aixa method:  3.8 L/min, 2 L/min/m2 9. Cardiac output and index by thermodilution method:  4.0 L/min, 2.1 L/min/m2 10. Supplemental cardio-pulmonary support:  Inotrope:  Milrinone 0.375 mcg/kg/min intravenous 11. Heart rate:  59 bpm 12. Systemic blood pressure:  142 / 58 / 86  mmHg 13. Systemic vascular resistance: 1621 dynes * sec/cm5 14. Pulmonary vascular resistance: 2.6 Woods Units 15. Right ventricular function parameters:  Pulmonary artery pulsitility index:  2.8  RA/PCWP ratio:  0.5 CONCLUSIONS:  Mildly elevated left-sided filling pressures with borderline cardiac output and index on milrinone 0.375 mcg/kg/min  Mild post-capillary pulmonary hypertension  Preserved right ventricular function  RECOMMENDATIONS: Findings relayed to inpatient attending, Niktia Warren MD. [place]-Radha catheter sutured in place at 50cm. Patient transferred to CVTU for further management. Jeff Gonzalez MD Advanced Heart Failure, Mechanical Circulatory Support, Transplant Cardiology & Pulmonary Hypertension [facility]        Labs     Recent Labs   Lab 01/20/24  0650 01/20/24  0311 01/19/24  1043 01/19/24  0227 01/18/24  1623 01/18/24  1027 01/18/24  0314 01/17/24  0545 01/17/24  0434 01/15/24  1609 01/15/24  0644   WBC  --  8.1  --  8.1  --   --  9.0  --  6.3   < > 8.5   HCT  --  42.9  --  42.7  --   --  43.5  --  34.3*   < > 42.1   HGB  --  14.4  --  14.2  --   --  14.7  --  11.4*   < > 14.5   PLT  --  177  --  168  --   --  174  --  143   < > 201   INR  --  1.1  --  1.2  --   --  1.1  --  1.3   < > 1.4   PTT 39*  --   --  64* 53*   < > 44*  --  57*   < > 46*   SODIUM  --  132* 133* 135  --   --  132* 135 138   < > 136   POTASSIUM  --  3.9 3.9 3.5  --   --  3.9 4.3 3.1*   < > 3.7   CHLORIDE  --  104 104 108  --   --  105 108 113*   < > 106   CO2  --  24 24 23  --   --  23 23 20*   < > 23   CALCIUM  --  9.3 9.4 8.2*  --    --  9.4 9.2 6.4*   < > 9.0   GLUCOSE  --  120* 81 78  --   --  97 92 211*   < > 90   BUN  --  23* 15 16  --   --  19 16 14   < > 18   CREATININE  --  1.05 1.01 0.97  --   --  0.94 0.95 0.79   < > 1.06   AST  --   --   --   --   --   --   --   --   --   --  19   GPT  --   --   --   --   --   --   --   --   --   --  16   ALKPT  --   --   --   --   --   --   --   --   --   --  112   BILIRUBIN  --   --   --   --   --   --   --   --   --   --  0.3   ALBUMIN  --   --   --   --   --   --   --   --   --   --  3.6   PHOS  --   --   --   --   --   --  4.1 3.4 2.5   < >  --     < > = values in this interval not displayed.         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 ( #25 Mechanical Valve) (3/3/2023)  -S/p multiple surgical interventions              -sp rhc 1/15: Mildly elevated left-sided filling pressures with borderline cardiac output and index               -1/12 TTE EF 15%             -On home  milrinone drip> weaning >stopped              -continue Entresto, torsemide, spironolactone, Jardiance             -continue amiodarone daily, coreg BID             -AHF cs              -RHC 1/15;  Mildly elevated left-sided filling pressures with borderline cardiac output and index on milrinone 0.375 mcg/kg/min   Mild post-capillary pulmonary hypertension   Preserved right ventricular function     -Plan for home with  IV for Primacor      # Hx  of non-sustained ventricular tachycardia  # S/p ICD implantation (6/6/23)             -hep gtt   -Warfarin with INR 1.1        Hx Tobacco use               -Denies tobacco use currently     Anxiety             -Xanax PRN     Insomnia              -monitor     Hx Adjustment Disorder, with Anxious mood              -Lexapro daily             -Xanax PRN              DVT PROPHYLAXIS: Heparin gtt bridge to Warfarin       Code Status: Full Resuscitation     Critical Care: Spent 30-74 minutes of critical time managing, evaluating, and providing care for this patient.         Disposition:  29 y/o male admitted with bacteremia, questionable PICC line source. ID and HF team following; RHC 1/15; Mildly elevated left-sided filling pressures with borderline cardiac output and index on milrinone 0.375 mcg/kg/min  Mild post-capillary pulmonary hypertension Preserved right ventricular function. ID discontinued IV ABT. Milrinone resumed, tunnel PICC placed 1/19 for IV home Primacor. SW assisting with HH for IV infusion. Plan to discharge next week. Heparin gtt with Coumadin, INR 1.1        Primary Care Physician  Bill Haynes MD    Will DW Alize in detail with decision making; will update if needed:    All patient questions answered  All labs and imaging reviewed    Galileo Tenorio, RANDAL   1/20/2024           FAMILY HISTORY:  No pertinent family history in first degree relatives